# Patient Record
Sex: FEMALE | Race: BLACK OR AFRICAN AMERICAN | NOT HISPANIC OR LATINO | Employment: PART TIME | URBAN - METROPOLITAN AREA
[De-identification: names, ages, dates, MRNs, and addresses within clinical notes are randomized per-mention and may not be internally consistent; named-entity substitution may affect disease eponyms.]

---

## 2024-05-29 ENCOUNTER — HOSPITAL ENCOUNTER (INPATIENT)
Facility: REHABILITATION | Age: 59
End: 2024-05-29
Attending: PHYSICAL MEDICINE & REHABILITATION | Admitting: PHYSICAL MEDICINE & REHABILITATION
Payer: MEDICAID

## 2024-05-29 DIAGNOSIS — I61.9 HEMORRHAGIC STROKE (HCC): ICD-10-CM

## 2024-05-29 DIAGNOSIS — Z78.9 IMPAIRED MOBILITY AND ADLS: ICD-10-CM

## 2024-05-29 DIAGNOSIS — Z74.09 IMPAIRED MOBILITY AND ADLS: ICD-10-CM

## 2024-05-29 DIAGNOSIS — I10 PRIMARY HYPERTENSION: ICD-10-CM

## 2024-05-29 PROBLEM — D72.829 LEUKOCYTOSIS: Status: ACTIVE | Noted: 2024-05-29

## 2024-05-29 PROBLEM — R33.9 URINARY RETENTION: Status: ACTIVE | Noted: 2024-05-29

## 2024-05-29 PROCEDURE — 99223 1ST HOSP IP/OBS HIGH 75: CPT | Performed by: PHYSICAL MEDICINE & REHABILITATION

## 2024-05-29 PROCEDURE — 700102 HCHG RX REV CODE 250 W/ 637 OVERRIDE(OP): Performed by: PHYSICAL MEDICINE & REHABILITATION

## 2024-05-29 PROCEDURE — 770010 HCHG ROOM/CARE - REHAB SEMI PRIVAT*

## 2024-05-29 PROCEDURE — 94760 N-INVAS EAR/PLS OXIMETRY 1: CPT

## 2024-05-29 PROCEDURE — A9270 NON-COVERED ITEM OR SERVICE: HCPCS | Performed by: PHYSICAL MEDICINE & REHABILITATION

## 2024-05-29 RX ORDER — ACETAMINOPHEN 500 MG
500 TABLET ORAL EVERY 6 HOURS PRN
Status: DISCONTINUED | OUTPATIENT
Start: 2024-05-29 | End: 2024-06-20 | Stop reason: HOSPADM

## 2024-05-29 RX ORDER — ALUMINA, MAGNESIA, AND SIMETHICONE 2400; 2400; 240 MG/30ML; MG/30ML; MG/30ML
20 SUSPENSION ORAL
Status: DISCONTINUED | OUTPATIENT
Start: 2024-05-29 | End: 2024-06-20 | Stop reason: HOSPADM

## 2024-05-29 RX ORDER — ECHINACEA PURPUREA EXTRACT 125 MG
2 TABLET ORAL PRN
Status: DISCONTINUED | OUTPATIENT
Start: 2024-05-29 | End: 2024-06-20 | Stop reason: HOSPADM

## 2024-05-29 RX ORDER — ONDANSETRON 2 MG/ML
4 INJECTION INTRAMUSCULAR; INTRAVENOUS 4 TIMES DAILY PRN
Status: DISCONTINUED | OUTPATIENT
Start: 2024-05-29 | End: 2024-06-20 | Stop reason: HOSPADM

## 2024-05-29 RX ORDER — OMEPRAZOLE 20 MG/1
20 CAPSULE, DELAYED RELEASE ORAL DAILY
Status: DISCONTINUED | OUTPATIENT
Start: 2024-05-29 | End: 2024-06-20 | Stop reason: HOSPADM

## 2024-05-29 RX ORDER — LISINOPRIL 20 MG/1
40 TABLET ORAL
Status: DISCONTINUED | OUTPATIENT
Start: 2024-05-30 | End: 2024-06-07

## 2024-05-29 RX ORDER — IPRATROPIUM BROMIDE AND ALBUTEROL SULFATE 2.5; .5 MG/3ML; MG/3ML
3 SOLUTION RESPIRATORY (INHALATION)
Status: DISCONTINUED | OUTPATIENT
Start: 2024-05-29 | End: 2024-06-20 | Stop reason: HOSPADM

## 2024-05-29 RX ORDER — AMLODIPINE BESYLATE 5 MG/1
10 TABLET ORAL
Status: DISCONTINUED | OUTPATIENT
Start: 2024-05-30 | End: 2024-06-20 | Stop reason: HOSPADM

## 2024-05-29 RX ORDER — TAMSULOSIN HYDROCHLORIDE 0.4 MG/1
0.4 CAPSULE ORAL
Status: DISCONTINUED | OUTPATIENT
Start: 2024-05-29 | End: 2024-06-07

## 2024-05-29 RX ORDER — AMOXICILLIN 250 MG
2 CAPSULE ORAL EVERY EVENING
Status: DISCONTINUED | OUTPATIENT
Start: 2024-05-29 | End: 2024-06-07

## 2024-05-29 RX ORDER — BISACODYL 10 MG
10 SUPPOSITORY, RECTAL RECTAL
Status: DISCONTINUED | OUTPATIENT
Start: 2024-05-29 | End: 2024-06-20 | Stop reason: HOSPADM

## 2024-05-29 RX ORDER — CARBOXYMETHYLCELLULOSE SODIUM 5 MG/ML
1 SOLUTION/ DROPS OPHTHALMIC PRN
Status: DISCONTINUED | OUTPATIENT
Start: 2024-05-29 | End: 2024-06-20 | Stop reason: HOSPADM

## 2024-05-29 RX ORDER — LANOLIN ALCOHOL/MO/W.PET/CERES
6 CREAM (GRAM) TOPICAL
Status: DISCONTINUED | OUTPATIENT
Start: 2024-05-29 | End: 2024-06-20 | Stop reason: HOSPADM

## 2024-05-29 RX ORDER — TRAZODONE HYDROCHLORIDE 50 MG/1
50 TABLET ORAL
Status: DISCONTINUED | OUTPATIENT
Start: 2024-05-29 | End: 2024-06-20 | Stop reason: HOSPADM

## 2024-05-29 RX ORDER — POLYETHYLENE GLYCOL 3350 17 G/17G
1 POWDER, FOR SOLUTION ORAL
Status: DISCONTINUED | OUTPATIENT
Start: 2024-05-29 | End: 2024-06-07

## 2024-05-29 RX ORDER — ONDANSETRON 4 MG/1
4 TABLET, ORALLY DISINTEGRATING ORAL 4 TIMES DAILY PRN
Status: DISCONTINUED | OUTPATIENT
Start: 2024-05-29 | End: 2024-06-20 | Stop reason: HOSPADM

## 2024-05-29 RX ORDER — HYDRALAZINE HYDROCHLORIDE 25 MG/1
25 TABLET, FILM COATED ORAL EVERY 8 HOURS PRN
Status: DISCONTINUED | OUTPATIENT
Start: 2024-05-29 | End: 2024-06-20 | Stop reason: HOSPADM

## 2024-05-29 RX ORDER — HYDRALAZINE HYDROCHLORIDE 50 MG/1
50 TABLET, FILM COATED ORAL 4 TIMES DAILY
Status: DISCONTINUED | OUTPATIENT
Start: 2024-05-29 | End: 2024-06-05

## 2024-05-29 RX ADMIN — TAMSULOSIN HYDROCHLORIDE 0.4 MG: 0.4 CAPSULE ORAL at 16:21

## 2024-05-29 RX ADMIN — BENZOCAINE AND MENTHOL 1 LOZENGE: 15; 3.6 LOZENGE ORAL at 20:47

## 2024-05-29 RX ADMIN — OMEPRAZOLE 20 MG: 20 CAPSULE, DELAYED RELEASE ORAL at 16:21

## 2024-05-29 RX ADMIN — ACETAMINOPHEN 500 MG: 500 TABLET, FILM COATED ORAL at 20:51

## 2024-05-29 RX ADMIN — HYDRALAZINE HYDROCHLORIDE 50 MG: 50 TABLET ORAL at 16:21

## 2024-05-29 RX ADMIN — HYDRALAZINE HYDROCHLORIDE 50 MG: 50 TABLET ORAL at 20:47

## 2024-05-29 RX ADMIN — SENNOSIDES AND DOCUSATE SODIUM 2 TABLET: 50; 8.6 TABLET ORAL at 20:47

## 2024-05-29 ASSESSMENT — LIFESTYLE VARIABLES
HAVE YOU EVER FELT YOU SHOULD CUT DOWN ON YOUR DRINKING: NO
TOTAL SCORE: 0
HAVE PEOPLE ANNOYED YOU BY CRITICIZING YOUR DRINKING: NO
EVER_SMOKED: YES
TOTAL SCORE: 0
CONSUMPTION TOTAL: NEGATIVE
ALCOHOL_USE: NO
HOW MANY TIMES IN THE PAST YEAR HAVE YOU HAD 5 OR MORE DRINKS IN A DAY: 0
EVER HAD A DRINK FIRST THING IN THE MORNING TO STEADY YOUR NERVES TO GET RID OF A HANGOVER: NO
TOTAL SCORE: 0
ON A TYPICAL DAY WHEN YOU DRINK ALCOHOL HOW MANY DRINKS DO YOU HAVE: 0
AVERAGE NUMBER OF DAYS PER WEEK YOU HAVE A DRINK CONTAINING ALCOHOL: 0
EVER FELT BAD OR GUILTY ABOUT YOUR DRINKING: NO

## 2024-05-29 ASSESSMENT — PATIENT HEALTH QUESTIONNAIRE - PHQ9
2. FEELING DOWN, DEPRESSED, IRRITABLE, OR HOPELESS: NOT AT ALL
SUM OF ALL RESPONSES TO PHQ9 QUESTIONS 1 AND 2: 0
1. LITTLE INTEREST OR PLEASURE IN DOING THINGS: NOT AT ALL

## 2024-05-29 ASSESSMENT — FIBROSIS 4 INDEX: FIB4 SCORE: 0.7

## 2024-05-29 NOTE — FLOWSHEET NOTE
05/29/24 1526   Events/Summary/Plan   Events/Summary/Plan RT Consult   Vital Signs   Pulse 74   Respiration 16   Pulse Oximetry 97 %   $ Pulse Oximetry (Spot Check) Yes   Respiratory Assessment   Level of Consciousness Alert   Chest Exam   Work Of Breathing / Effort Within Normal Limits   Breath Sounds   RUL Breath Sounds Clear   RML Breath Sounds Clear   RLL Breath Sounds Diminished   OZIEL Breath Sounds Clear   LLL Breath Sounds Diminished   Oxygen   O2 Delivery Device Room air w/o2 available   Smoking History   Have you ever smoked Yes   Have you smoked in the last 12 months Yes   Have you quit smoking Yes   Confirm Quit Date 05/23/24

## 2024-05-29 NOTE — PROGRESS NOTES
Patient admitted to facility at 1510 via gurney; accompanied by hospital transport. Patient assisted to room and positioned in bed for comfort and safety, call light within reach. Patient assisted with stowing belongings and oriented to room and facility. Admission assessment performed and documented in computer. Admission paperwork completed, signed copies placed in chart.   Fall prevention video and tip sheet shown to patient, educated on call light use, and need to call nursing staff for transfer assistance.

## 2024-05-29 NOTE — H&P
Physical Medicine & Rehabilitation  History and Physical (H&P)  &     Post Admission Physician Evaluation (MC)       Date of Admission: 5/29/2024   Date of Service: 5/29/2024   Latanya Ferrera  Room/bed 108 D    [facility] Code to Support Admission: 0001.2 - Stroke: Right Body Involvement (Left Brain)  Etiologic Diagnosis: Hemorrhagic stroke (HCC)    _______________________________________________    Chief Complaint: Stroke     History of Present Illness:    Latanya Ferrera is a 57 yo female with PMH significant for tobacco use, hypertension who presented to [facility] with right arm weakness an leg weakness. Her symptoms began suddenly while at home. Initially she had facial numbness, later noticed arm weakness. She had chills in the days prior. She had been on anti-hypertensives, but had not been taking them for the past couple of months and had not been monitoring her BP. Her BP in ED was in 200's. She had leukocytosis at 12.9. Imaging revealed left intracranial hemorrhage in the L thalamus. This was compatible with hypertensive hemorrhage. No underlying mass or vascular malformations seen. Neurology and NSGY consulted. No neurosurgical intervention after stable repeat imaging. Her hospital course c/b uncontrolled BP now on 3 agents, urinary retention now with otero, leukocytosis now on Rocephin. Code stroke was called 5/28 but evaluation did not reveal anything new.     Deemed appropriate for [facility] and admitted 5/29/2024. On admission patient reports she is doing ok. Has not had BM in a couple of days. Is using IDFC for bladder management has paresthesias of her R side and feels numb. Arm and leg feel equally weak. Wants to make sure we are giving her anti-hypertensives. Dtr and mother at bedside. Speech is slurred.     Review of Systems:     14 point ROS reviewed and negative except as stated above.      Past Medical History:  Past Medical History:   Diagnosis Date    Hypertension     Tobacco abuse        Past Surgical  History:  No past surgical history on file.    Family History:  No family history on file.    Medications:  Current Facility-Administered Medications   Medication Dose    Respiratory Therapy Consult      Pharmacy Consult Request ...Pain Management Review 1 Each  1 Each    omeprazole (PriLOSEC) capsule 20 mg  20 mg    hydrALAZINE (Apresoline) tablet 25 mg  25 mg    senna-docusate (Pericolace Or Senokot S) 8.6-50 MG per tablet 2 Tablet  2 Tablet    And    polyethylene glycol/lytes (Miralax) Packet 1 Packet  1 Packet    bisacodyl (Dulcolax) suppository 10 mg  10 mg    magnesium hydroxide (Milk Of Magnesia) suspension 30 mL  30 mL    acetaminophen (Tylenol) tablet 500 mg  500 mg    carboxymethylcellulose (Refresh Tears) 0.5 % ophthalmic drops 1 Drop  1 Drop    benzocaine-menthol (Cepacol) lozenge 1 Lozenge  1 Lozenge    mag hydrox-al hydrox-simeth (Maalox Plus Es Or Mylanta Ds) suspension 20 mL  20 mL    ondansetron (Zofran ODT) dispertab 4 mg  4 mg    Or    ondansetron (Zofran) syringe/vial injection 4 mg  4 mg    traZODone (Desyrel) tablet 50 mg  50 mg    sodium chloride (Ocean) 0.65 % nasal spray 2 Spray  2 Spray    [START ON 5/30/2024] cefTRIAXone (Rocephin) 1,000 mg in  mL IVPB  1,000 mg    ipratropium-albuterol (DUONEB) nebulizer solution  3 mL    [START ON 5/30/2024] amLODIPine (Norvasc) tablet 10 mg  10 mg    hydrALAZINE (Apresoline) tablet 50 mg  50 mg    [START ON 5/30/2024] lisinopril (Prinivil) tablet 40 mg  40 mg    melatonin tablet 6 mg  6 mg    tamsulosin (Flomax) capsule 0.4 mg  0.4 mg       Allergies:  Patient has no allergy information on record.    Psychosocial History:  Living Site:  Home  Living With:  self  Caregiver's availability:   Intermittent support       Level of Function Prior to Disability:  Independent    Level of Function Prior to Admission to [facility]:      CURRENT LEVEL OF FUNCTION:   Same as level of function prior to admission to [facility]    Physical Examination:     VITAL SIGNS:   pulse is 74. Her respiration is 16 and oxygen saturation is 97%.   GENERAL: No apparent distress  HEENT: Normocephalic/atraumatic and EOMI. Right facial droop  CARDIAC: Regular rate and rhythm. No murmurs appreciated.   LUNGS: Clear to auscultation   ABDOMINAL: soft, nontender, and nondistended    EXTREMITIES: no edema. + clonus RLE at ankle. No overt spasticity  NEURO:  Mental status:  A&Ox4 (person, place, date, situation) answers questions appropriately follows commands  Speech: + dysarthria  CRANIAL NERVES: Right facial droop and sensation impairment     Motor Exam Upper Extremities   ? Myotome R L   Shoulder Abduction C5 2 5   Elbow flexion C5 2 5   Wrist extension C6 2 5   Elbow extension C7 2 5   Finger flexion C8 1 5   Finger abduction T1 1 5     Motor Exam Lower Extremities  ? Myotome R L   Hip flexion L2 2 5   Knee extension L3 2 5   Ankle dorsiflexion L4 2 5   Toe extension L5 2 5   Ankle plantarflexion S1 2 5     DTRs: 2+ in bilateral biceps and patellar tendons  + clonus R ankle  Negative Cheung b/l   Tone: no spasticity noted      Radiology:      Laboratory Values:  WBC - 11.8  Hgb - 12.6  Hct - 39  Plt - 266    Na - 142  Cr - 0.6  BUN - 8  ALT/AST - 16/14  K+ - 3.8        Primary Rehabilitation Diagnosis:    This patient is a 58 y.o. female admitted for acute inpatient rehabilitation with Hemorrhagic stroke (HCC).    Impairments:   Cognitive  ADLs/IADLs  Mobility    Secondary Diagnosis/Medical Co-morbidities Affecting Function  Leukocytosis, hypertension, impaired ADLs, impaired mobility, cognitive impairment.     Relevant Changes Since Preadmission Evaluation:    Status unchanged    The patient's rehabilitation potential is Excellent  The patient's medical prognosis is excellent    Rehabilitation Plan:   Discussion and Recommendations:   1. The patient requires an acute inpatient rehabilitation program with a coordinated program of care at an  intensity and frequency not available at a lower level of care. This recommendation is substantiated by the patient's medical physicians who recommend that the patient's intervention and assessment of medical issues needs to be done at an acute level of care for patient's safety and maximum outcome.   2. A coordinated program of care will be supplied by an interdisciplinary team of physical therapy, occupational therapy, rehab physician, rehab nursing, and, if needed, speech therapy and rehab psychology. Rehab team presents a patient-specific rehabilitation and education program concentrating on prevention of future problems related to accessibility, mobility, skin, bowel, bladder, sexuality, and psychosocial and medical/surgical problems.   3. Need for Rehabilitation Physician: The rehab physician will be evaluating the patient on a multi-weekly basis to help coordinate the program of care. The rehab physician communicates between medical physicians, therapists, and nurses to maximize the patient's potential outcome. Specific areas in which the rehab physician will be providing daily assessment include the following:   A. Assessing the patient's heart rate and blood pressure response (vitals monitoring) to activity and making adjustments in medications or conservative measures as needed.   B. The rehab physician will be assessing the frequency at which the program can be increased to allow the patient to reach optimal functional outcome.   C. The rehab physician will also provide assessments in daily skin care, especially in light of patient's impairments in mobility.   D. The rehab physician will provide special expertise in understanding how to work with functional impairment and recommend appropriate interventions, compensatory techniques, and education that will facilitate the patient's outcome.   4. Rehab R.N.   The rehab RN will be working with patient to carry over in room mobility and activities of daily  living when the patient is not in 3 hours of skilled therapy. Rehab nursing will be working in conjunction with rehab physician to address all the medical issues above and continue to assess laboratory work and discuss abnormalities with the treating physicians, assess vitals, and response to activity, and discuss and report abnormalities with the rehab physician. Rehab RN will also continue daily skin care, supervise bladder/bowel program, instruct in medication administration, and ensure patient safety.   5. Rehab Therapy: Therapies to treat at intensity and frequency of (may change after completion of evaluation by all therapeutic disciplines):       PT:  Physical therapy to address mobility, transfer, gait training and evaluation for adaptive equipment needs 1 hour/day at least 5 days/week for the duration of the ELOS (see below)       OT:  Occupational therapy to address ADLs, self-care, home management training, functional mobility/transfers and assistive device evaluation, and community re-integration 1  hour/day at least 5 days/week for the duration of the ELOS (see below).        ST/Dysphagia:  Speech therapy to address speech, language, and cognitive deficits as well as swallowing difficulties with retraining/dysphagia management and community re-integration with comprehension, expression, cognitive training 1  hour/day at least 5 days/week for the duration of the ELOS (see below).     Medical management / Rehabilitation Issues/ Adverse Potential as part of rehabilitation plan     Rehabilitation Issues/Adverse Potential  1.  CVA (Cerebrovascular Accident): Cont blood pressure control for secondary prophylaxis as well as lipid and blood pressure management. Patient demonstrates functional deficits in strength, balance, coordination, and ADL's. Patient is admitted to [facility] for comprehensive rehabilitation therapy as described below.   Rehabilitation nursing monitors bowel and bladder  control, educates on medication administration, co-morbidities and monitors patient safety.    2.  Neurostimulants: None at this time but continue to assess daily for need to initiate should status change.    3.  DVT prophylaxis:  Patient is not on chemoprophylaxis for anticoagulation upon transfer. Encourage OOB. Monitor daily for signs and symptoms of DVT including but not limited to swelling and pain to prevent the development of DVT that may interfere with therapies.    4.  GI prophylaxis:  On prilosec to prevent gastritis/dyspepsia which may interfere with therapies.    5.  Pain: Controlled with Tyelnol    6.  Nutrition/Dysphagia: Dietician monitors nutrient intake, recommend supplements prn and provide nutrition education to pt/family to promote optimal nutrition for wound healing/recovery.     7.  Bladder/bowel:  Start bowel and bladder program as described below, to prevent constipation, urinary retention (which may lead to UTI), and urinary incontinence (which will impact upon pt's functional independence).   - TV Q3h while awake with post void bladder scans, I&O cath for PVRs >400  - up to commode after meal     8.  Skin/dermal ulcer prophylaxis: Monitor for new skin conditions with q.2 h. turns as required to prevent the development of skin breakdown.     9.  Cognition/Behavior: As needed psychologist provides adjustment counseling to illness and psychosocial barriers that may be potential barriers to rehabilitation.     10. Respiratory therapy: RT performs O2 management prn, breathing retraining, pulmonary hygiene and bronchospasm management prn to optimize participation in therapies.     Medical Co-Morbidities/Adverse Potential Affecting Function:    Left Basal Ganglia ICH secondary to hypertensive emergency  Right Hemiparesis  PT and OT for mobility and ADLs. Per guidelines, 15 hours per week between PT, OT and/or SLP.  Follow-up Neurology (Dr. Babb)  Secondary stroke ppx:  Anti-hypertensives    Hypertension  Continue Hydralazine 50mg q6 hours  Continue Lisinopril 40mg daily   Continue Amlodipine 10mg daily     Leukocytosis  Improving on admission but unclear cause  Continue Rocephin x 2 doses (through 5/31)    Urinary Retention  Has otero - required CIC   Continue Flomax 0.4mg nightly     Pain  Tylenol PRN    GI Ppx  Patient on Prilosec for GERD prophylaxis.     Bowel   Patient on Senna-docusate for constipation prophylaxis.       Upcoming Labs/imaging: Admission labs    DVT PROPHYLAXIS: None - Hemorrhagic stroke. Check US. Per Neuro note, clear for DVT chemo-ppx once MRI completed (completed 5/24)    HOSPITALIST FOLLOWING: None    CODE STATUS: FULL CODE    DISPO: Home with minimal support    VIANCA: TBD    MEDS SENT TO: TBD    DISCHARGE SPECIALIST FOLLOW UP: Neurology    Patient to scheduled follow up with their PCP within 2 weeks from discharge from the [facility].     I personally performed a complete drug regimen review and no potential clinically significant medication issues were identified.     Goals/Expected Level of Function Based on Current Medical and Functional Status:  (may change based on patient's medical status and rate of impairment recovery)  Transfers:   Modified Independent  Mobility/Gait:   Modified Independent  ADL's:   Modified Independent  Cognition:  Modified Independent     DISPOSITION: Discharge to pre-morbid independent living setting with the supportive care of patient's family.    ELOS: 21-28 days  ____________________________________    Dr. Sofi Jules DO, MS  [facility] [place] - Physical Medicine & Rehabilitation   ____________________________________    Pt was seen today for 75 min, and entire time spent in face-to-face contact was >50% in counseling and coordination of care as detailed in A/P above.

## 2024-05-29 NOTE — PROGRESS NOTES
4 Eyes Skin Assessment Completed by Amada JACINTO, RN and Marlee RN.    Head WDL  Ears WDL  Nose WDL  Mouth WDL  Neck WDL  Breast/Chest WDL  Shoulder Blades WDL  Spine WDL  (R) Arm/Elbow/Hand WDL  (L) Arm/Elbow/Hand WDL  Abdomen WDL  Groin WDL  Scrotum/Coccyx/Buttocks WDL  (R) Leg WDL  (L) Leg WDL  (R) Heel/Foot/Toe Calloused  (L) Heel/Foot/Toe Calloused          Devices In Places otero      Interventions In Place Waffle Overlay, Pillows, and Q2 Turns    Possible Skin Injury No    Pictures Uploaded Into Epic Yes  Wound Consult Placed N/A  RN Wound Prevention Protocol Ordered Yes

## 2024-05-30 ENCOUNTER — APPOINTMENT (OUTPATIENT)
Dept: PHYSICAL THERAPY | Facility: REHABILITATION | Age: 59
DRG: 057 | End: 2024-05-30
Attending: PHYSICAL MEDICINE & REHABILITATION
Payer: MEDICAID

## 2024-05-30 ENCOUNTER — ANCILLARY PROCEDURE (OUTPATIENT)
Dept: CARDIOLOGY | Facility: REHABILITATION | Age: 59
End: 2024-05-30
Attending: PHYSICAL MEDICINE & REHABILITATION
Payer: MEDICAID

## 2024-05-30 ENCOUNTER — APPOINTMENT (OUTPATIENT)
Dept: SPEECH THERAPY | Facility: REHABILITATION | Age: 59
DRG: 057 | End: 2024-05-30
Attending: PHYSICAL MEDICINE & REHABILITATION
Payer: MEDICAID

## 2024-05-30 ENCOUNTER — APPOINTMENT (OUTPATIENT)
Dept: OCCUPATIONAL THERAPY | Facility: REHABILITATION | Age: 59
DRG: 057 | End: 2024-05-30
Attending: PHYSICAL MEDICINE & REHABILITATION
Payer: MEDICAID

## 2024-05-30 LAB
25(OH)D3 SERPL-MCNC: 7 NG/ML (ref 30–100)
ALBUMIN SERPL BCP-MCNC: 3.6 G/DL (ref 3.2–4.9)
ALBUMIN/GLOB SERPL: 1.2 G/DL
ALP SERPL-CCNC: 85 U/L (ref 30–99)
ALT SERPL-CCNC: 11 U/L (ref 2–50)
ANION GAP SERPL CALC-SCNC: 13 MMOL/L (ref 7–16)
AST SERPL-CCNC: 9 U/L (ref 12–45)
BASOPHILS # BLD AUTO: 0.2 % (ref 0–1.8)
BASOPHILS # BLD: 0.02 K/UL (ref 0–0.12)
BILIRUB SERPL-MCNC: 0.5 MG/DL (ref 0.1–1.5)
BUN SERPL-MCNC: 21 MG/DL (ref 8–22)
CALCIUM ALBUM COR SERPL-MCNC: 9.3 MG/DL (ref 8.5–10.5)
CALCIUM SERPL-MCNC: 9 MG/DL (ref 8.5–10.5)
CHLORIDE SERPL-SCNC: 110 MMOL/L (ref 96–112)
CHOLEST SERPL-MCNC: 155 MG/DL (ref 100–199)
CO2 SERPL-SCNC: 19 MMOL/L (ref 20–33)
CREAT SERPL-MCNC: 0.67 MG/DL (ref 0.5–1.4)
EOSINOPHIL # BLD AUTO: 0.28 K/UL (ref 0–0.51)
EOSINOPHIL NFR BLD: 2.6 % (ref 0–6.9)
ERYTHROCYTE [DISTWIDTH] IN BLOOD BY AUTOMATED COUNT: 47 FL (ref 35.9–50)
EST. AVERAGE GLUCOSE BLD GHB EST-MCNC: 114 MG/DL
GFR SERPLBLD CREATININE-BSD FMLA CKD-EPI: 102 ML/MIN/1.73 M 2
GLOBULIN SER CALC-MCNC: 2.9 G/DL (ref 1.9–3.5)
GLUCOSE SERPL-MCNC: 109 MG/DL (ref 65–99)
HBA1C MFR BLD: 5.6 % (ref 4–5.6)
HCT VFR BLD AUTO: 35.7 % (ref 37–47)
HDLC SERPL-MCNC: 30 MG/DL
HGB BLD-MCNC: 11.8 G/DL (ref 12–16)
IMM GRANULOCYTES # BLD AUTO: 0.04 K/UL (ref 0–0.11)
IMM GRANULOCYTES NFR BLD AUTO: 0.4 % (ref 0–0.9)
LDLC SERPL CALC-MCNC: 109 MG/DL
LYMPHOCYTES # BLD AUTO: 1.88 K/UL (ref 1–4.8)
LYMPHOCYTES NFR BLD: 17.6 % (ref 22–41)
MAGNESIUM SERPL-MCNC: 2.2 MG/DL (ref 1.5–2.5)
MCH RBC QN AUTO: 30.5 PG (ref 27–33)
MCHC RBC AUTO-ENTMCNC: 33.1 G/DL (ref 32.2–35.5)
MCV RBC AUTO: 92.2 FL (ref 81.4–97.8)
MONOCYTES # BLD AUTO: 0.74 K/UL (ref 0–0.85)
MONOCYTES NFR BLD AUTO: 6.9 % (ref 0–13.4)
NEUTROPHILS # BLD AUTO: 7.75 K/UL (ref 1.82–7.42)
NEUTROPHILS NFR BLD: 72.3 % (ref 44–72)
NRBC # BLD AUTO: 0 K/UL
NRBC BLD-RTO: 0 /100 WBC (ref 0–0.2)
PHOSPHATE SERPL-MCNC: 4.6 MG/DL (ref 2.5–4.5)
PLATELET # BLD AUTO: 239 K/UL (ref 164–446)
PMV BLD AUTO: 10.8 FL (ref 9–12.9)
POTASSIUM SERPL-SCNC: 3.9 MMOL/L (ref 3.6–5.5)
PROT SERPL-MCNC: 6.5 G/DL (ref 6–8.2)
RBC # BLD AUTO: 3.87 M/UL (ref 4.2–5.4)
SODIUM SERPL-SCNC: 142 MMOL/L (ref 135–145)
TRIGL SERPL-MCNC: 79 MG/DL (ref 0–149)
TSH SERPL DL<=0.005 MIU/L-ACNC: 2.24 UIU/ML (ref 0.38–5.33)
WBC # BLD AUTO: 10.7 K/UL (ref 4.8–10.8)

## 2024-05-30 PROCEDURE — 770010 HCHG ROOM/CARE - REHAB SEMI PRIVAT*

## 2024-05-30 PROCEDURE — 80053 COMPREHEN METABOLIC PANEL: CPT

## 2024-05-30 PROCEDURE — 83735 ASSAY OF MAGNESIUM: CPT

## 2024-05-30 PROCEDURE — 83036 HEMOGLOBIN GLYCOSYLATED A1C: CPT

## 2024-05-30 PROCEDURE — A9270 NON-COVERED ITEM OR SERVICE: HCPCS | Performed by: PHYSICAL MEDICINE & REHABILITATION

## 2024-05-30 PROCEDURE — 93970 EXTREMITY STUDY: CPT

## 2024-05-30 PROCEDURE — 36415 COLL VENOUS BLD VENIPUNCTURE: CPT

## 2024-05-30 PROCEDURE — 97163 PT EVAL HIGH COMPLEX 45 MIN: CPT

## 2024-05-30 PROCEDURE — 92523 SPEECH SOUND LANG COMPREHEN: CPT

## 2024-05-30 PROCEDURE — 93970 EXTREMITY STUDY: CPT | Mod: 26 | Performed by: INTERNAL MEDICINE

## 2024-05-30 PROCEDURE — 700105 HCHG RX REV CODE 258: Performed by: PHYSICAL MEDICINE & REHABILITATION

## 2024-05-30 PROCEDURE — 700111 HCHG RX REV CODE 636 W/ 250 OVERRIDE (IP): Mod: JZ | Performed by: PHYSICAL MEDICINE & REHABILITATION

## 2024-05-30 PROCEDURE — 99232 SBSQ HOSP IP/OBS MODERATE 35: CPT | Performed by: PHYSICAL MEDICINE & REHABILITATION

## 2024-05-30 PROCEDURE — 97116 GAIT TRAINING THERAPY: CPT

## 2024-05-30 PROCEDURE — 84100 ASSAY OF PHOSPHORUS: CPT

## 2024-05-30 PROCEDURE — 82306 VITAMIN D 25 HYDROXY: CPT

## 2024-05-30 PROCEDURE — 85025 COMPLETE CBC W/AUTO DIFF WBC: CPT

## 2024-05-30 PROCEDURE — 80061 LIPID PANEL: CPT

## 2024-05-30 PROCEDURE — 97535 SELF CARE MNGMENT TRAINING: CPT

## 2024-05-30 PROCEDURE — 97166 OT EVAL MOD COMPLEX 45 MIN: CPT

## 2024-05-30 PROCEDURE — 84443 ASSAY THYROID STIM HORMONE: CPT

## 2024-05-30 PROCEDURE — 700102 HCHG RX REV CODE 250 W/ 637 OVERRIDE(OP): Performed by: PHYSICAL MEDICINE & REHABILITATION

## 2024-05-30 RX ORDER — VITAMIN B COMPLEX
5000 TABLET ORAL DAILY
Status: DISCONTINUED | OUTPATIENT
Start: 2024-05-30 | End: 2024-06-20 | Stop reason: HOSPADM

## 2024-05-30 RX ORDER — ENOXAPARIN SODIUM 100 MG/ML
40 INJECTION SUBCUTANEOUS DAILY
Status: DISCONTINUED | OUTPATIENT
Start: 2024-06-03 | End: 2024-06-19

## 2024-05-30 RX ORDER — DOCUSATE SODIUM 100 MG/1
200 CAPSULE, LIQUID FILLED ORAL 2 TIMES DAILY
Status: DISCONTINUED | OUTPATIENT
Start: 2024-05-30 | End: 2024-06-07

## 2024-05-30 RX ADMIN — DOCUSATE SODIUM 200 MG: 100 CAPSULE, LIQUID FILLED ORAL at 20:33

## 2024-05-30 RX ADMIN — TAMSULOSIN HYDROCHLORIDE 0.4 MG: 0.4 CAPSULE ORAL at 17:23

## 2024-05-30 RX ADMIN — HYDRALAZINE HYDROCHLORIDE 50 MG: 50 TABLET ORAL at 17:23

## 2024-05-30 RX ADMIN — POLYETHYLENE GLYCOL 3350 1 PACKET: 17 POWDER, FOR SOLUTION ORAL at 17:23

## 2024-05-30 RX ADMIN — HYDRALAZINE HYDROCHLORIDE 50 MG: 50 TABLET ORAL at 20:33

## 2024-05-30 RX ADMIN — OMEPRAZOLE 20 MG: 20 CAPSULE, DELAYED RELEASE ORAL at 08:14

## 2024-05-30 RX ADMIN — SENNOSIDES AND DOCUSATE SODIUM 2 TABLET: 50; 8.6 TABLET ORAL at 20:33

## 2024-05-30 RX ADMIN — Medication 5000 UNITS: at 17:23

## 2024-05-30 RX ADMIN — ACETAMINOPHEN 500 MG: 500 TABLET, FILM COATED ORAL at 20:38

## 2024-05-30 RX ADMIN — ACETAMINOPHEN 500 MG: 500 TABLET, FILM COATED ORAL at 17:23

## 2024-05-30 RX ADMIN — CEFTRIAXONE SODIUM 1000 MG: 1 INJECTION, POWDER, FOR SOLUTION INTRAMUSCULAR; INTRAVENOUS at 09:03

## 2024-05-30 RX ADMIN — LISINOPRIL 40 MG: 20 TABLET ORAL at 05:14

## 2024-05-30 RX ADMIN — HYDRALAZINE HYDROCHLORIDE 50 MG: 50 TABLET ORAL at 08:14

## 2024-05-30 RX ADMIN — AMLODIPINE BESYLATE 10 MG: 5 TABLET ORAL at 05:14

## 2024-05-30 SDOH — ECONOMIC STABILITY: TRANSPORTATION INSECURITY
IN THE PAST 12 MONTHS, HAS THE LACK OF TRANSPORTATION KEPT YOU FROM MEDICAL APPOINTMENTS OR FROM GETTING MEDICATIONS?: NO

## 2024-05-30 SDOH — ECONOMIC STABILITY: TRANSPORTATION INSECURITY
IN THE PAST 12 MONTHS, HAS LACK OF RELIABLE TRANSPORTATION KEPT YOU FROM MEDICAL APPOINTMENTS, MEETINGS, WORK OR FROM GETTING THINGS NEEDED FOR DAILY LIVING?: NO

## 2024-05-30 ASSESSMENT — BRIEF INTERVIEW FOR MENTAL STATUS (BIMS)
INITIAL REPETITION OF BED BLUE SOCK - FIRST ATTEMPT: 3
WHAT YEAR IS IT: CORRECT
ASKED TO RECALL BLUE: YES, NO CUE REQUIRED
WHAT DAY OF THE WEEK IS IT: CORRECT
WHAT MONTH IS IT: ACCURATE WITHIN 5 DAYS
ASKED TO RECALL BED: NO, COULD NOT RECALL
ASKED TO RECALL SOCK: YES, NO CUE REQUIRED
BIMS SUMMARY SCORE: 13

## 2024-05-30 ASSESSMENT — PATIENT HEALTH QUESTIONNAIRE - PHQ9
1. LITTLE INTEREST OR PLEASURE IN DOING THINGS: NOT AT ALL
2. FEELING DOWN, DEPRESSED, IRRITABLE, OR HOPELESS: NOT AT ALL
SUM OF ALL RESPONSES TO PHQ9 QUESTIONS 1 AND 2: 0

## 2024-05-30 ASSESSMENT — ACTIVITIES OF DAILY LIVING (ADL)
BED_CHAIR_WHEELCHAIR_TRANSFER_DESCRIPTION: OTHER (COMMENT);VERBAL CUEING
TOILETING: INDEPENDENT

## 2024-05-30 ASSESSMENT — GAIT ASSESSMENTS
DISTANCE (FEET): 60
GAIT LEVEL OF ASSIST: MAXIMAL ASSIST
ASSISTIVE DEVICE: OTHER (COMMENTS)

## 2024-05-30 ASSESSMENT — PAIN DESCRIPTION - PAIN TYPE: TYPE: ACUTE PAIN

## 2024-05-30 NOTE — THERAPY
Occupational Therapy  Daily Treatment     Patient Name: Latanya Ferrera  Age:  58 y.o., Sex:  female  Medical Record #: [id number]  Today's Date: 5/30/2024     Precautions  Precautions: (P) Fall Risk (ICH)    Subjective    Patient received resting semi-supine in bed, alert and agreeable to OT treatment, medically stable and cleared for tx by RN.        Objective       05/30/24 0701   OT Charge Group   Charges Yes   OT Self Care / ADL (Units) 1   OT Evaluation OT Evaluation Mod   OT Total Time Spent   OT Individual Total Time Spent (Mins) 60   Prior Living Situation   Prior Services None   Housing / Facility 1 Story House   Steps Into Home 2   Steps In Home 0   Rail None   Elevator No   Bathroom Set up Bathtub / Shower Combination;Shower Curtain   Equipment Owned None   Lives with - Patient's Self Care Capacity Alone and Able to Care For Self  (Reports that she has family in the area who are able to assist)   Prior Level of ADL Function   Self Feeding Independent   Grooming / Hygiene Independent   Bathing Independent   Dressing Independent   Toileting Independent   Prior Level of IADL Function   Medication Management Independent   Laundry Independent   Kitchen Mobility Independent   Finances Independent   Home Management Independent   Shopping Independent   Prior Level Of Mobility Independent Without Device in Community   Driving / Transportation Driving Independent   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 Patient without c/o dizziness or lightheadedness   Cognitive Pattern Assessment   Cognitive Pattern Assessment Used BIMS   Brief Interview for Mental Status (BIMS)   Repetition of Three  "Words (First Attempt) 3   Temporal Orientation: Year Correct   Temporal Orientation: Month Accurate within 5 days   Temporal Orientation: Day Correct   Recall: \"Sock\" Yes, no cue required   Recall: \"Blue\" Yes, no cue required   Recall: \"Bed\" No, could not recall   BIMS Summary Score 13   Confusion Assessment Method (CAM)   Is there evidence of an acute change in mental status from the patient's baseline? No   Inattention Behavior not present   Disorganized thinking Behavior not present   Altered level of consciousness Behavior not present   Active ROM Upper Body   Active ROM Upper Body  X   Lt Shoulder Flexion Degrees   (Seated 0-100, Supine 0-180)   Eating   Assistance Needed Set-up / clean-up   CARE Score - Eating 5   Eating Discharge Goal   Discharge Goal 6   Oral Hygiene   Assistance Needed Set-up / clean-up   CARE Score - Oral Hygiene 5   Oral Hygiene Discharge Goal   Discharge Goal 6   Shower/Bathe Self   Reason if not Attempted Refused to perform   CARE Score - Shower/Bathe Self 7   Shower/Bathe Self Discharge Goal   Discharge Goal 5   Upper Body Dressing   Assistance Needed Physical assistance   Physical Assistance Level 25% or less   CARE Score - Upper Body Dressing 3   Upper Body Dressing Discharge Goal   Discharge Goal 6   Lower Body Dressing   Assistance Needed Physical assistance   Physical Assistance Level 51%-75%   CARE Score - Lower Body Dressing 2   Lower Body Dressing Discharge Goal   Discharge Goal 6   Putting On/Taking Off Footwear   Assistance Needed Physical assistance   Physical Assistance Level Total assistance   CARE Score - Putting On/Taking Off Footwear 1   Putting On/Taking Off Footwear Discharge Goal   Discharge Goal 6   Toileting Hygiene   Assistance Needed Physical assistance   Physical Assistance Level Total assistance   CARE Score - Toileting Hygiene 1   Toileting Hygiene Discharge Goal   Discharge Goal 6   Toilet Transfer   Assistance Needed Physical assistance   Physical Assistance " Level 26%-50%   CARE Score - Toilet Transfer 3   Toilet Transfer Discharge Goal   Discharge Goal 6   Hearing, Speech, and Vision   Ability to Hear Adequate   Ability to See in Adequate Light Adequate   Expression of Ideas and Wants Without difficulty   Understanding Verbal and Non-Verbal Content Understands   Functional Level of Assist   Grooming Standby Assist;Seated   Grooming Description   (Washing face/hands and oral care seated in wheelchair sink-side)   Bathing   (Patient deferred at this time, reporting she bathed with her daughter yesterday)   Upper Body Dressing Minimal Assist   Upper Body Dressing Description Assit with threading arms through sleeves;Verbal cueing  (Assist to thread over LUE with verbal cues for john-technique)   Lower Body Dressing Maximal Assist   Lower Body Dressing Description Increased time;Initial preparation for task  (Assist to thread over distal BLE 2/2 impaired RUE coordination. Patient able to pull up over proximal legs however assist needed to pull up over hips.)   Toileting Maximal Assist   Toileting Description Assist to pull pants up;Grab bar;Increased time;Assist for hygiene  (Via catheter)   Bed, Chair, Wheelchair Transfer Moderate Assist   Bed Chair Wheelchair Transfer Description   (Stand pivot transfer with HHA)   Toilet Transfers Moderate Assist  (Stand pivot transfer)   Problem List   Problem List Decreased Active Daily Living Skills;Decreased Homemaking Skills;Decreased Upper Extremity Strength Right;Decreased Upper Extremity AROM Right;Decreased Functional Mobility;Decreased Activity Tolerance;Safety Awareness Deficits / Cognition;Impaired Posture / Trunk Alignment;Impaired Coordination Right Upper Extremity;Impaired Sensation Right Upper Extremity;Impaired Postural Control / Balance   Precautions   Precautions Fall Risk  (ICH)   Current Discharge Plan   Current Discharge Plan Return to Prior Living Situation  (With increased support from friens and family)    Benefit    Therapy Benefit Patient Would Benefit from Inpatient Rehab Occupational Therapy to Maximize [place] with ADLs, IADLs and Functional Mobility.   Interdisciplinary Plan of Care Collaboration   IDT Collaboration with  Physical Therapist;Nursing   Patient Position at End of Therapy Seated;Chair Alarm On;Tray Table within Reach;Call Light within Reach;Phone within Reach  (Up in wheelchair for breakfast)   OT DME Recommendations   Bathroom Equipment Tub Transfer Bench (Medicaid Only)   Strengths & Barriers   Strengths Able to follow instructions;Good insight into deficits/needs;Independent prior level of function;Manages pain appropriately;Motivated for self care and independence;Pleasant and cooperative;Supportive family;Willingly participates in therapeutic activities   Barriers Decreased endurance;Hemiparesis;Home accessibility;Impaired activity tolerance;Impaired balance  (Impaired coordination/motor control)   Occupational Therapist Assigned   Assigned OT / Treatment Time / Comments Fox 30/60       Assessment    Per MD H&P dated 5/29/24: Latanya Ferrera is a 59 yo female with PMH significant for tobacco use, hypertension who presented to [facility] with right arm weakness an leg weakness. Her symptoms began suddenly while at home. Initially she had facial numbness, later noticed arm weakness. She had chills in the days prior. She had been on anti-hypertensives, but had not been taking them for the past couple of months and had not been monitoring her BP. Her BP in ED was in 200's. She had leukocytosis at 12.9. Imaging revealed left intracranial hemorrhage in the L thalamus. This was compatible with hypertensive hemorrhage. No underlying mass or vascular malformations seen. Neurology and NSGY consulted. No neurosurgical intervention after stable repeat imaging. Her hospital course c/b uncontrolled BP now on 3 agents, urinary retention now with otero, leukocytosis now on Rocephin. Code stroke was called  5/28 but evaluation did not reveal anything new.     Patient seen for OT evaluation. Patient with fair tolerance to therapeutic activities and self-care tasks at this time, limited by impaired activity tolerance/endurance, unilateral weakness on R side, generalized weakness/deconditioning, impaired static/dynamic balance, impaired task sequencing, and impaired motor control/coordination. Prior to admission, patient was generally functioning at INDEPENDENT level for ADLs and INDEPENDENT level for IADLs. Patient reports receiving support from her family if needed. Upon initial evaluation, patient generally performing ADLs/functional transfers at MOD A level with min-mod verbal cues for safe initiation/sequencing. Anticipate patient will make steady progress towards functional goals. Patient will benefit from ongoing OT treatment to address limitations noted above and progress towards functional baseline.      Strengths: (P) Able to follow instructions, Good insight into deficits/needs, Independent prior level of function, Manages pain appropriately, Motivated for self care and independence, Pleasant and cooperative, Supportive family, Willingly participates in therapeutic activities  Barriers: (P) Decreased endurance, Hemiparesis, Home accessibility, Impaired activity tolerance, Impaired balance (Impaired coordination/motor control)    Plan  Recommend Occupational Therapy 30-60 minutes per day 5-6 days per week for 2-3 weeks for the following treatments:  OT E Stim Attended, OT Self Care/ADL, OT Community Reintegration, OT Neuro Re-Ed/Balance, OT Sensory Int Techniques, OT Therapeutic Activity, and OT Therapeutic Exercise.    Cleared by Dr. Jules for e-stim       DME  OT DME Recommendations  Bathroom Equipment: (P) Tub Transfer Bench (Medicaid Only)    Passport items to be completed:  Perform bathroom transfers, complete dressing, complete feeding, get ready for the day, prepare a simple meal, participate in household  tasks, adapt home for safety needs, demonstrate home exercise program, complete caregiver training           Problem: Bathing  Goal: STG-Within one week, patient will bathe with MIN A and LRAD  Outcome: Progressing     Problem: Dressing  Goal: STG-Within one week, patient will dress LB with CGA and LRAD  Outcome: Progressing     Problem: Functional Transfers  Goal: STG-Within one week, patient will transfer to toilet with CGA and LRAD  Outcome: Progressing  Goal: STG-Within one week, patient will transfer to tub/shower with CGA and LRAD  Outcome: Progressing     Problem: Functional Cognition  Goal: STG-Within one week, patient will attend to R arm and incorporate into functional tasks with min cues  Outcome: Progressing     Problem: OT Long Term Goals  Goal: LTG-By discharge, patient will complete basic self care tasks with SPV-MOD I and LRAD  Outcome: Progressing  Goal: LTG-By discharge, patient will perform bathroom transfers with SPV-MOD I and LRAD  Outcome: Progressing

## 2024-05-30 NOTE — THERAPY
"Physical Therapy   Initial Evaluation     Patient Name: Latanya Ferrera  Age:  58 y.o., Sex:  female  Medical Record #: [id number]  Today's Date: 5/30/2024     Subjective    \"The neurologist told me I would be better in 3 weeks.\" Pt in bed at arrival, on phone c/ family member, agreeable to PT tx.      Objective       05/30/24 1231   PT Charge Group   Charges Yes   PT Gait Training (Units) 1   PT Evaluation PT Evaluation High   PT Total Time Spent   PT Individual Total Time Spent (Mins) 60   Prior Living Situation   Prior Services None   Housing / Facility 1 Story House   Steps Into Home 2   Steps In Home 0   Rail None   Elevator No   Bathroom Set up Bathtub / Shower Combination   Equipment Owned None   Lives with - Patient's Self Care Capacity Alone and Able to Care For Self   Comments  is incarcerated, lives alone, spends time at daughter's house and is caregiver for 7 year old grandson   Prior Level of Functional Mobility   Bed Mobility Independent   Transfer Status Independent   Ambulation Independent   Distance Ambulation (Feet) 01406  (reports walking > 96120 steps/day)   Assistive Devices Used None   Wheelchair Other (Comments)  (N/A)   Stairs Independent   Comments works in retail FT, [employer], 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   Prior Functioning: Everyday Activities   Self Care Independent   Indoor Mobility (Ambulation) Independent   Stairs Independent   Functional Cognition Independent   Prior Device Use None of the given options   Vitals   Pulse 92   Patient BP Position Sitting   Blood Pressure 119/64   Pulse Oximetry 96 %   O2 Delivery Device None - Room Air   Cognition    Cognition / Consciousness X   Level of Consciousness Alert   Safety Awareness Impaired  (moves quickly)   Attention Impaired  (redirection to task needed)   Comments able to follow 1 step commands, needs increased time and occasional VC to slow down or to initiate movement   Passive ROM Lower Body   Passive ROM Lower Body WDL   Active ROM Lower " Body    Active ROM Lower Body  X   Comments RLE impaired due to strength and motor control impairments   Strength Lower Body   Lower Body Strength  X   Rt Hip Extension Strength 2+ (P+)   Rt Hip Flexion Strength 3- (F-)   Rt Hip Abduction Strength 3- (F-)   Rt Hip Adduction Strength 3- (F-)   Rt Knee Flexion Strength 3 (F)   Rt Knee Extension Strength 3- (F-)   Rt Ankle Dorsiflexion Strength 2+ (P+)   Rt Ankle Plantar Flexion Strength 2+ (P+)   Gross Strength   (LLR grossly 4/5)   Sensation Lower Body   Lower Extremity Sensation   X   Rt Lower Extremity Light Touch Impaired  (intact)   Rt Lower Extremity Proprioception   (intact)   Lt Lower Extremity Light Touch   (intact)   Lt Lower Extremity Proprioception   (intact)   Comments RUE/LE impaired to light touch, but detects all test points; proprioception intact in RLE, absent R hand and wrist, intact proximally  (No extinction to auditory, tactile, visual stimuli; Visual fields intact)   Lower Body Muscle Tone   Lower Body Muscle Tone  X   Rt Lower Extremity Muscle Tone Hypotonic   Comments RUE hypotonic   Balance Assessment   Sitting Balance (Static) Fair -   Sitting Balance (Dynamic) Poor -   Standing Balance (Static) Trace +   Standing Balance (Dynamic) Trace +   Weight Shift Sitting Fair   Weight Shift Standing Poor   Bed Mobility    Supine to Sit Minimal Assist  (steadying assist)   Sit to Supine Minimal Assist  (help c/ RLE)   Sit to Stand Moderate Assist   Scooting Minimal Assist   Rolling Minimal Assist to Rt.;Minimum Assist to Lt.   Neurological Concerns   Neurological Concerns Yes   Footdrop Present  (RLE)   Coordination Lower Body    Comments RLE impaired based on observation of AROM   Roll Left and Right   Assistance Needed Physical assistance   Physical Assistance Level 26%-50%   CARE Score - Roll Left and Right 3   Roll Left and Right Discharge Goal   Discharge Goal 6   Sit to Lying   Assistance Needed Physical assistance   Physical Assistance Level  26%-50%   CARE Score - Sit to Lying 3   Sit to Lying Discharge Goal   Discharge Goal 6   Lying to Sitting on Side of Bed   Assistance Needed Physical assistance   Physical Assistance Level 26%-50%   CARE Score - Lying to Sitting on Side of Bed 3   Lying to Sitting on Side of Bed Discharge Goal   Discharge Goal 6   Sit to Stand   Assistance Needed Physical assistance   Physical Assistance Level 51%-75%   CARE Score - Sit to Stand 2   Sit to Stand Discharge Goal   Discharge Goal 6   Chair/Bed-to-Chair Transfer   Assistance Needed Physical assistance   Physical Assistance Level 51%-75%   CARE Score - Chair/Bed-to-Chair Transfer 2   Chair/Bed-to-Chair Transfer Discharge Goal   Discharge Goal 6   Car Transfer   Assistance Needed Physical assistance   Physical Assistance Level 51%-75%   CARE Score - Car Transfer 2   Car Transfer Discharge Goal   Discharge Goal 4   Walk 10 Feet   Assistance Needed Physical assistance   Physical Assistance Level 76% or more   CARE Score - Walk 10 Feet 2   Walk 10 Feet Discharge Goal   Discharge Goal 4   Walk 50 Feet with Two Turns   Assistance Needed Physical assistance   Physical Assistance Level 76% or more   CARE Score - Walk 50 Feet with Two Turns 2   Walk 50 Feet with Two Turns Discharge Goal   Discharge Goal 4   Walk 150 Feet   Assistance Needed Physical assistance   Physical Assistance Level Total assistance   CARE Score - Walk 150 Feet 1   Walk 150 Feet Discharge Goal   Discharge Goal 4   Walking 10 Feet on Uneven Surfaces   Assistance Needed Physical assistance   Physical Assistance Level Total assistance   CARE Score - Walking 10 Feet on Uneven Surfaces 1   Walking 10 Feet on Uneven Surfaces Discharge Goal   Discharge Goal 4   1 Step (Curb)   Assistance Needed Physical assistance   Physical Assistance Level Total assistance   CARE Score - 1 Step (Curb) 1   1 Step (Curb) Discharge Goal   Discharge Goal 4   4 Steps   Assistance Needed Physical assistance   Physical Assistance Level  Total assistance   CARE Score - 4 Steps 1   4 Steps Discharge Goal   Discharge Goal 4   12 Steps   Assistance Needed Physical assistance   Physical Assistance Level Total assistance   CARE Score - 12 Steps 1   12 Steps Discharge Goal   Discharge Goal 4   Picking Up Object   Assistance Needed Physical assistance   Physical Assistance Level Total assistance   CARE Score - Picking Up Object 1   Picking Up Object Discharge Goal   Discharge Goal 6   Wheel 50 Feet with Two Turns   Assistance Needed Physical assistance   Physical Assistance Level Total assistance   CARE Score - Wheel 50 Feet with Two Turns 1   Wheel 50 Feet with Two Turns Discharge Goal   Discharge Goal 6   Wheel 150 Feet   Assistance Needed Physical assistance   Physical Assistance Level Total assistance   CARE Score - Wheel 150 Feet 1   Wheel 150 Feet Discharge Goal   Discharge Goal 6   Gait Functional Level of Assist    Gait Level Of Assist Maximal Assist   Assistive Device Other (Comments)  (LHR)   Distance (Feet) 60   # of Times Distance was Traveled 1  (1 x 30')   Deviation Step To;Decreased Base Of Support;Decreased Toe Off;Decreased Heel Strike;Shuffled Gait  (step to, crosses midline, poor RLE advancement and foot clearance, help at trunk for midline control, VC/MC for WS and to advance LUE on rail, sequence gait pattern)   Wheelchair Functional Level of Assist   Wheelchair Assist Total Assist   Distance Wheelchair (Feet or Distance) 15   Wheelchair Description Assistance with steering  (needs hand over hand and manual assist to propel c/ RUE on chair, unable to detect hand position to engage with and  wheel)   Stairs Functional Level of Assist   Level of Assist with Stairs Total Assist X 2   # of Stairs Climbed 6   Stairs Description Hand rails;Extra time;Safety concerns;Requires assist to advance foot  (step to pattern ascending and descending, help to steady trunk/control pelvis, and stabilize RLE on step when descending; VC sequencing,  "unable to maintain  RUE on rail during step effort)   Transfer Functional Level of Assist   Bed, Chair, Wheelchair Transfer Moderate Assist   Bed Chair Wheelchair Transfer Description Other (comment);Verbal cueing  (stand pivot c/ steadying, turning, lowering assist and verbal/manual cues for hand placement and sequencing)   Problem List    Problems Pain;Impaired Bed Mobility;Impaired Ambulation;Impaired Transfers;Functional ROM Deficit;Functional Strength Deficit;Impaired Balance;Impaired Coordination;Decreased Activity Tolerance;Motor Planning / Sequencing;Safety Awareness Deficits / Cognition   Precautions   Precautions Fall Risk  (R hemiplegia, R loss of sensation in hand, ICH)   Current Discharge Plan   Current Discharge Plan Return to Prior Living Situation   Interdisciplinary Plan of Care Collaboration   IDT Collaboration with  Occupational Therapist;Family / Caregiver   Patient Position at End of Therapy In Bed;Call Light within Reach;Tray Table within Reach;Phone within Reach   Collaboration Comments CLOF, spoke with  re: POC and CLOF   PT DME Recommendations   Wheelchair 18\" Width;Jaciel Height (16\"-17\");Removable/Flip Back Armrests;Standard Leg Rests;Anti-tippers  (may need specialty chair)   Cushion Standard   Assistive Device Other (Comments)  (TBD pending progress: HW>quad cane>cane)   Additional Equipment Other (Comments)  (TBD)   Physical Therapist Assigned   Assigned PT / Treatment Time / Comments Carmen. 60-90 tx. Tech assist for sessions.   Benefit   Therapy Benefit Patient Would Benefit from Inpatient Rehabilitation Physical Therapy to Maximize Functional Maury with ADLs, IADLs and Mobility.   Strengths & Barriers   Strengths Able to follow instructions;Alert and oriented;Independent prior level of function;Motivated for self care and independence;Pleasant and cooperative;Supportive family;Willingly participates in therapeutic activities   Barriers Decreased endurance;Difficulty " following instructions;Fatigue;Hemiplegia;Home accessibility;Hypertension;Impaired balance;Impaired carryover of learning;Limited mobility  (lives alone, decreased knowledge of condition, limited daytime family support)     Vision screen:  Glasses: wears glasses (bifocals)  Changes in vision: no  Visual fields: intact to kinetic and confrontation testing    Patient education: reviewed PT plan of care, rehab expectations, mobility needs and patient passport, orientation to unit, role of PT, fall risk and use of call light. Also discussed CLOF and prognosis for improvement with pt and  by phone at pt's request.     Assessment    The patient is a 58 y.o. female admitted to [facility] inpatient rehabilitation 5/29/2024 with severe functional debility after L basal ganglia, L thalamic hemorrhagic CVA.    Pre H&P: Latanya Ferrera is a 59 yo female with PMH significant for tobacco use, hypertension who presented to [facility]/23/24 with right arm weakness and leg weakness. Her symptoms began suddenly while at home. Initially she had facial numbness, later noticed arm weakness. She had chills in the days prior. She had been on anti-hypertensives, but had not been taking them for the past couple of months and had not been monitoring her BP. Her BP in ED was in 200's. She had leukocytosis at 12.9. Imaging revealed left intracranial hemorrhage in the L thalamus. This was compatible with hypertensive hemorrhage. No underlying mass or vascular malformations seen. Neurology and NSGY consulted. No neurosurgical intervention after stable repeat imaging. Her hospital course c/b uncontrolled BP now on 3 agents, urinary retention now with otero, leukocytosis now on Rocephin. Code stroke was called 5/28 but evaluation did not reveal anything new.      Patient's PMH includes:  Past Medical History:   Diagnosis Date    Hypertension     Tobacco abuse          PT evaluation performed today; functional performance at today's assessment  is as above. Patient currently performing bed mobility at Min to ModA, transfers c/ stand step method at ModA, gait/stairs at Max to TotalA.     Primary limitations include joint position and sensory impairment in RUE/hand, R hemiparesis impacting effective reach and grasp of RUE, and RLE strength/motor control, and ROM. Pt with R ankle weakness, grossly hypotonic at trunk and RLE, and would likely benefit from anterior shell AFO trial for improved knee stability in stance with careful blocking to prevent hyperextension.     Additional factors influencing patient status and prognosis include: prior level of function, limited family support, and the above comorbidities.     At baseline, pt worked full time and cares for her 7 year old grandson, drives, and ambulates > 10,000 steps/day without devices. Her daughter is main local support as her  is incarcerated but she works full time.     The patient is performing well below their baseline level of function and will benefit from an interdisciplinary high intensity rehabilitation program to maximize functional independence, decrease burden of care, and support a safe return to home with intermittent family support.     Plan  Recommend Physical Therapy  minutes per day 5-7 days per week for 3-4 weeks for the following treatments:  PT Group Therapy, PT E Stim Attended, PT Orthotics Training, PT Gait Training, PT Self Care/Home Eval, PT Therapeutic Exercises, PT TENS Application, PT Neuro Re-Ed/Balance, PT Aquatic Therapy, PT Therapeutic Activity, PT Manual Therapy, and PT Evaluation.    Passport items to be completed:  Get in/out of bed safely, in/out of a vehicle, safely use mobility device, walk or wheel around home/community, navigate up and down stairs, show how to get up/down from the ground, ensure home is accessible, demonstrate HEP, complete caregiver training    Goals:  Long term and short term goals have been discussed with patient and spouse and  they are in agreement.      Physical Therapy Problems (Active)       Problem: Balance       Dates: Start:  05/30/24         Goal: STG-Within one week, patient will maintain static standing c/ 1UE support at SBA or better, >/= 2 minutes.        Dates: Start:  05/30/24               Problem: Mobility       Dates: Start:  05/30/24         Goal: STG-Within one week, patient will ambulate household distances >/= 50' c/ LRAD and TotalA or better.        Dates: Start:  05/30/24               Problem: Mobility Transfers       Dates: Start:  05/30/24         Goal: STG-Within one week, patient will transfer bed to chair c/ Sonia or better c/ appropriate compensatory method (reach pivot vs SPT).       Dates: Start:  05/30/24               Problem: PT-Long Term Goals       Dates: Start:  05/30/24         Goal: LTG-By discharge, patient will ambulate >/= 300' c/ LRAD and CGA or better.        Dates: Start:  05/30/24            Goal: LTG-By discharge, patient will transfer one surface to another c/ Boni.        Dates: Start:  05/30/24            Goal: LTG-By discharge, patient will ambulate up/down 2 stairs c/ CGA or better to access home.        Dates: Start:  05/30/24            Goal: LTG-By discharge, patient will transfer in/out of a car c/ CGA or better.        Dates: Start:  05/30/24

## 2024-05-30 NOTE — PROGRESS NOTES
"  Physical Medicine & Rehabilitation Progress Note    Encounter Date: 5/30/2024    Chief Complaint: doing better    Interval Events (Subjective):  LAURA  BM 5/27  IDFC    Seen and examined in her room. Daughter present. Patient doing well. She states that she worked hard with therapy and that she was able to walk. She gets teary at times, but is improving.       ROS: 14 point ROS negative unless otherwise specified in the HPI    Objective:  VITAL SIGNS: /66   Pulse 82   Temp 36.6 °C (97.8 °F) (Temporal)   Resp 17   Ht 1.676 m (5' 6\")   Wt 82.3 kg (181 lb 8 oz)   SpO2 98%   BMI 29.29 kg/m²     GEN: No apparent distress  HEENT: Head normocephalic, atraumatic.  Sclera nonicteric bilaterally, no ocular discharge appreciated bilaterally.  CV: Extremities warm and well-perfused, no peripheral edema appreciated bilaterally.  PULMONARY: Breathing nonlabored on room air, no respiratory accessory muscle use.  Not requiring supplemental oxygen.  SKIN: No appreciable skin breakdown on exposed areas of skin.  PSYCH: Mood and affect within normal limits.  NEURO: Awake alert. Conversational. Logical thought content. Dysarthric.   : Ramirez with some hematuria      Laboratory Values:  Recent Results (from the past 72 hour(s))   CBC with Differential    Collection Time: 05/30/24  5:42 AM   Result Value Ref Range    WBC 10.7 4.8 - 10.8 K/uL    RBC 3.87 (L) 4.20 - 5.40 M/uL    Hemoglobin 11.8 (L) 12.0 - 16.0 g/dL    Hematocrit 35.7 (L) 37.0 - 47.0 %    MCV 92.2 81.4 - 97.8 fL    MCH 30.5 27.0 - 33.0 pg    MCHC 33.1 32.2 - 35.5 g/dL    RDW 47.0 35.9 - 50.0 fL    Platelet Count 239 164 - 446 K/uL    MPV 10.8 9.0 - 12.9 fL    Neutrophils-Polys 72.30 (H) 44.00 - 72.00 %    Lymphocytes 17.60 (L) 22.00 - 41.00 %    Monocytes 6.90 0.00 - 13.40 %    Eosinophils 2.60 0.00 - 6.90 %    Basophils 0.20 0.00 - 1.80 %    Immature Granulocytes 0.40 0.00 - 0.90 %    Nucleated RBC 0.00 0.00 - 0.20 /100 WBC    Neutrophils (Absolute) 7.75 (H) " 1.82 - 7.42 K/uL    Lymphs (Absolute) 1.88 1.00 - 4.80 K/uL    Monos (Absolute) 0.74 0.00 - 0.85 K/uL    Eos (Absolute) 0.28 0.00 - 0.51 K/uL    Baso (Absolute) 0.02 0.00 - 0.12 K/uL    Immature Granulocytes (abs) 0.04 0.00 - 0.11 K/uL    NRBC (Absolute) 0.00 K/uL   Comp Metabolic Panel (CMP)    Collection Time: 05/30/24  5:42 AM   Result Value Ref Range    Sodium 142 135 - 145 mmol/L    Potassium 3.9 3.6 - 5.5 mmol/L    Chloride 110 96 - 112 mmol/L    Co2 19 (L) 20 - 33 mmol/L    Anion Gap 13.0 7.0 - 16.0    Glucose 109 (H) 65 - 99 mg/dL    Bun 21 8 - 22 mg/dL    Creatinine 0.67 0.50 - 1.40 mg/dL    Calcium 9.0 8.5 - 10.5 mg/dL    Correct Calcium 9.3 8.5 - 10.5 mg/dL    AST(SGOT) 9 (L) 12 - 45 U/L    ALT(SGPT) 11 2 - 50 U/L    Alkaline Phosphatase 85 30 - 99 U/L    Total Bilirubin 0.5 0.1 - 1.5 mg/dL    Albumin 3.6 3.2 - 4.9 g/dL    Total Protein 6.5 6.0 - 8.2 g/dL    Globulin 2.9 1.9 - 3.5 g/dL    A-G Ratio 1.2 g/dL   HEMOGLOBIN A1C    Collection Time: 05/30/24  5:42 AM   Result Value Ref Range    Glycohemoglobin 5.6 4.0 - 5.6 %    Est Avg Glucose 114 mg/dL   Lipid Profile (Lipid Panel)    Collection Time: 05/30/24  5:42 AM   Result Value Ref Range    Cholesterol,Tot 155 100 - 199 mg/dL    Triglycerides 79 0 - 149 mg/dL    HDL 30 (A) >=40 mg/dL     (H) <100 mg/dL   Magnesium    Collection Time: 05/30/24  5:42 AM   Result Value Ref Range    Magnesium 2.2 1.5 - 2.5 mg/dL   Phosphorus    Collection Time: 05/30/24  5:42 AM   Result Value Ref Range    Phosphorus 4.6 (H) 2.5 - 4.5 mg/dL   TSH with Reflex to FT4    Collection Time: 05/30/24  5:42 AM   Result Value Ref Range    TSH 2.240 0.380 - 5.330 uIU/mL   Vitamin D, 25-hydroxy (blood)    Collection Time: 05/30/24  5:42 AM   Result Value Ref Range    25-Hydroxy   Vitamin D 25 7 (L) 30 - 100 ng/mL   ESTIMATED GFR    Collection Time: 05/30/24  5:42 AM   Result Value Ref Range    GFR (CKD-EPI) 102 >60 mL/min/1.73 m 2       Medications:  Scheduled Medications    Medication Dose Frequency    Pharmacy Consult Request  1 Each PHARMACY TO DOSE    omeprazole  20 mg DAILY    senna-docusate  2 Tablet Q EVENING    cefTRIAXone (ROCEPHIN) IV  1,000 mg Q24HRS    amLODIPine  10 mg Q DAY    hydrALAZINE  50 mg 4X/DAY    lisinopril  40 mg Q DAY    tamsulosin  0.4 mg AFTER DINNER     PRN medications: Respiratory Therapy Consult, hydrALAZINE, senna-docusate **AND** polyethylene glycol/lytes, bisacodyl, magnesium hydroxide, acetaminophen, carboxymethylcellulose, benzocaine-menthol, mag hydrox-al hydrox-simeth, ondansetron **OR** ondansetron, traZODone, sodium chloride, ipratropium-albuterol, melatonin    Diet:  Current Diet Order   Procedures    Diet Order Diet: Regular       Medical Decision Making and Plan:  Left Basal Ganglia ICH secondary to hypertensive emergency  Right Hemiparesis  PT and OT for mobility and ADLs. Per guidelines, 15 hours per week between PT, OT and/or SLP.  Follow-up Neurology (Dr. Babb)  Secondary stroke ppx: Anti-hypertensives     Hypertension  Continue Hydralazine 50mg q6 hours  Continue Lisinopril 40mg daily   Continue Amlodipine 10mg daily   5/30 BP stable     Leukocytosis  Improving on admission but unclear cause  Continue Rocephin x 2 doses (through 5/31) - ok to d/c IV after     Urinary Retention  Has otero - required CIC   Continue Flomax 0.4mg nightly   5/30 Per patient preference keep otero another 1-2 days. D/w her r/o CAUTI. Patient and dtr aware.     Hyperphosphatemia  Monitor    Anemia  Mild monitor.      Pain  Tylenol PRN     GI Ppx  Patient on Prilosec for GERD prophylaxis.      Bowel   Patient on Senna-docusate for constipation prophylaxis.         Upcoming Labs/imaging: Admission labs     DVT PROPHYLAXIS: None - Hemorrhagic stroke. Check US - negative. Per Neuro note, clear for DVT chemo-ppx once MRI completed (completed 5/24). Lovenox 40 mg SQ scheduled to start 6/3.      HOSPITALIST FOLLOWING: None     CODE STATUS: FULL CODE     DISPO: Home with  minimal support     VIANCA: TBD     MEDS SENT TO: TBD     DISCHARGE SPECIALIST FOLLOW UP: Neurology     Patient to scheduled follow up with their PCP within 2 weeks from discharge from the [facility].   ____________________________________    Dr. Sofi Jules DO, MS  [facility] - Physical Medicine & Rehabilitation   ____________________________________

## 2024-05-30 NOTE — THERAPY
Speech Language Pathology   Initial Assessment     Patient Name: Latanya Ferrera  AGE:  58 y.o., SEX:  female  Medical Record #: [id number]  Today's Date: 5/30/2024     Subjective    Patient participated in cognitive linguistic evaluation. Patient reports living with spouse and is independent for all IADL's including driving, managing medications, and working part-time in retail.      Objective       05/30/24 0932   Evaluation Charges   SLP Speech Language Evaluation Speech Sound Language Comprehension   SLP Total Time Spent   SLP Individual Total Time Spent (Mins) 60   Prior Living Situation   Prior Services None   Housing / Facility 1 Story House   Lives with - Patient's Self Care Capacity Spouse   Prior Level Of Function   Communication Within Functional Limits   Swallow Within Functional Limits   Dentures Uppers   Hearing Within Functional Limits for Evaluation   Hearing Aid None   Vision Wears Corrective Lenses;Reading    Patient's Primary Language English   Occupation (Pre-Hospital Vocational) Employed Part Time   Confusion Assessment Method (CAM)   Is there evidence of an acute change in mental status from the patient's baseline? Yes   Inattention Behavior present, fluctuates (comes and goes, changes in severity)   Disorganized thinking Behavior present, fluctuates (comes and goes, changes in severity)   Altered level of consciousness Behavior not present   Functional Level of Assist   Comprehension Modified Independent   Comprehension Description Glasses   Expression Independent   Social Interaction Independent   Problem Solving Minimal Assist   Problem Solving Description Verbal cueing;Increased time;Bed/chair alarm   Memory Moderate Assist   Memory Description Verbal cueing;Increased time;Bed/chair alarm;Therapy schedule   Outcome Measures   Outcome Measures Utilized SCCAN   SCCAN (Scales of Cognitive and Communicative Ability for Neurorehabilitation)   Oral Expression - Raw Score 19   Oral Expression - Scale  Performance Score 100   Orientation - Raw Score 12   Orientation - Scale Performance Score 100   Memory - Raw Score 10   Memory - Scale Performance Score 53   Speech Comprehension - Raw Score 13   Speech Comprehension - Scale Performance Score 100   Reading Comprehension - Raw Score 10   Reading Comprehension - Scale Performance Score 83   Writing - Raw Score 6   Writing - Scale Performance Score 86   Attention - Raw Score 14   Attention - Scale Performance Score 88   Problem Solving - Raw Score 19   Problem Solving - Scale Performance Score 83   SCCAN Total Raw Score 80   SCCAN Degree of Severity Mild Impairment   Problem List   Problem List Cognitive-Linguistic Deficits   Current Discharge Plan   Current Discharge Plan Return to Prior Living Situation   Benefit   Therapy Benefit Patient would benefit from Inpatient Rehab Speech-Language Pathology to address above identified deficits.   Interdisciplinary Plan of Care Collaboration   IDT Collaboration with  Physician   Collaboration Comments ANGELICA   Speech Language Pathologist Assigned   Assigned SLP / Treatment Time / Comments EA 60         Assessment    Patient is 58 y.o. female with a diagnosis of hemorrhagic stroke.  Additional factors influencing patient status/progress (ie: cognitive factors, co-morbidities, social support, etc): PMH included HTN, Patient with IPLOF.    Cognitive linguistic evaluation was completed. Patient participated in SCCAN with a final raw score of 80 indicating mild cognitive deficits. Patient demonstrated difficulties in the areas of memory and attention. Would benefit from ST to address cognitive deficits and further assessment of medication and financial management tasks.         Plan  Recommend Speech Therapy 30-60 minutes per day 5-6 days per week for 1-2 weeks for the following treatments:  SLP Speech Language Treatment, SLP Self Care / ADL Training , SLP Cognitive Skill Development, and SLP Group Treatment.    Passport items to be  completed:  Express basic needs, understand food/liquid recommendations, consistently follow swallow precautions, manage finances, manage medications, arrive to therapy appointments on time, complete daily memory log entries, solve problems related to safety situations, review education related to hospitalization, complete caregiver training     Goals:  Long term and short term goals have been discussed with patient and daughter and they are in agreement.    Speech Therapy Problems (Active)       Problem: Memory STGs       Dates: Start:  05/30/24         Goal: STG-Within one week, patient will demonstrate use of memory strategies in order to recall information from therapies after a 2/hr delay.        Dates: Start:  05/30/24               Problem: Problem Solving STGs       Dates: Start:  05/30/24         Goal: STG-Within one week, patient will complete medication management tasks with 80% accuracy, given min verbal cues.        Dates: Start:  05/30/24            Goal: STG-Within one week, patient will complete alternating attention tasks with 80% accuracy given SPV       Dates: Start:  05/30/24               Problem: Speech/Swallowing LTGs       Dates: Start:  05/30/24         Goal: LTG-By discharge, patient will solve complex problems related to IADL's in order to d/c home with mod I        Dates: Start:  05/30/24

## 2024-05-30 NOTE — PROGRESS NOTES
NURSING DAILY NOTE    Name: Latanya Ferrera   Date of Admission: 5/29/2024   Admitting Diagnosis: Hemorrhagic stroke (HCC)  Attending Physician: Sofi Jules D.o.  Allergies: Patient has no allergy information on record.    Safety  Patient Assist     Patient Precautions     Precaution Comments     Bed Transfer Status     Toilet Transfer Status      Assistive Devices     Oxygen  Room air w/o2 available  Diet/Therapeutic Dining  Current Diet Order   Procedures    Diet Order Diet: Regular     Pill Administration  whole  Agitated Behavioral Scale     ABS Level of Severity       Fall Risk  Has the patient had a fall this admission?   No  Carly Fisher Fall Risk Scoring  15, HIGH RISK  Fall Risk Safety Measures  bed alarm and chair alarm    Vitals  Temperature: 37 °C (98.6 °F)  Temp src: Oral  Pulse: 83  Respiration: 18  Blood Pressure: 125/65  Blood Pressure MAP (Calculated): 85 MM HG  BP Location: Right, Upper Arm  Patient BP Position: Lying Right Side, Supine     Oxygen  Pulse Oximetry: 97 %  O2 Delivery Device: Room air w/o2 available    Bowel and Bladder  Last Bowel Movement  05/27/24  Stool Type     Bowel Device     Continent  Bladder: Did not void   Bowel: No movement  Bladder Function     Genitourinary Assessment   Bladder Assessment (WDL):  WDL Except  Ramirez Catheter: Present with Active Order  Ramirez Reasons per MD Order: Acute urinary retention or bladder outlet obstruction    Skin  Xavier Score   17  Sensory Interventions   Bed Types: Standard/Trauma Mattress  Skin Preventative Measures: Pillows in Use for Support / Positioning, Waffle Overlay  Moisture Interventions  Containment Devices: Indwelling Catheter      Pain  Pain Rating Scale  3 - Sometimes distracts me  Pain Location  Head  Pain Location Orientation     Pain Interventions   Medication (see MAR)    ADLs    Bathing      Linen Change      Personal Hygiene     Chlorhexidine Bath      Oral  Insurance verification submitted for year 2022  Care     Teeth/Dentures     Shave     Nutrition Percentage Eaten     Environmental Precautions     Patient Turns/Positioning  Patient Turns Self from Side to Side  Patient Turns Assistance/Tolerance     Bed Positions     Head of Bed Elevated         Psychosocial/Neurologic Assessment  Psychosocial Assessment  Psychosocial (WDL):  Within Defined Limits  Family Behaviors: Family Present  Neurologic Assessment  Level of Consciousness: Alert  Orientation Level: Oriented X4  Cognition: Appropriate judgement, Follows commands  Speech: Clear  Motor Function/Sensation Assessment: Sensation, Motor strength  RUE Sensation: Numbness  Muscle Strength Right Arm: Fair Strength against Gravity but No Resistance  LUE Sensation: Full sensation  Muscle Strength Left Arm: Normal Strength Against Gravity and Full Resistance  RLE Sensation: Numbness  Muscle Strength Right Leg: Fair Strength against Gravity but No Resistance  LLE Sensation: Full sensation  Muscle Strength Left Leg: Normal Strength Against Gravity and Full Resistance       Cardio/Pulmonary Assessment  Edema      Respiratory Breath Sounds  RUL Breath Sounds: Clear  RML Breath Sounds: Clear  RLL Breath Sounds: Diminished  OZIEL Breath Sounds: Clear  LLL Breath Sounds: Diminished  Cardiac Assessment   Cardiac (WDL):  WDL Except

## 2024-05-30 NOTE — DISCHARGE PLANNING
CASE MANAGEMENT INITIAL ASSESSMENT    Admit Date:  5/29/2024     I met with patient to discuss role of case management / discharge planning / team conference.   Patient is a  58 y.o. female transferred from outside hospital.       Diagnosis: Hemorrhagic stroke (HCC) [I61.9]    Co-morbidities:   Patient Active Problem List    Diagnosis Date Noted    Hemorrhagic stroke (HCC) 05/29/2024    Leukocytosis 05/29/2024    Urinary retention 05/29/2024    Primary hypertension 05/29/2024     Prior Living Situation:       Prior Level of Function:       Support Systems:  Primary : Alcira Tobar-daughter: [phone].       Previous Services Utilized:        Other Information:        Primary Payor Source:  ([facility])    Patient / Family Goal:  Patient / Family Goal: Return Home    Plan:  1. Continue to follow patient through hospitalization and provide discharge planning in collaboration with patient, family, physicians and ancillary services.     2. Utilize community resources to ensure a safe discharge.

## 2024-05-31 ENCOUNTER — APPOINTMENT (OUTPATIENT)
Dept: SPEECH THERAPY | Facility: REHABILITATION | Age: 59
DRG: 057 | End: 2024-05-31
Attending: PHYSICAL MEDICINE & REHABILITATION
Payer: MEDICAID

## 2024-05-31 ENCOUNTER — APPOINTMENT (OUTPATIENT)
Dept: OCCUPATIONAL THERAPY | Facility: REHABILITATION | Age: 59
DRG: 057 | End: 2024-05-31
Attending: PHYSICAL MEDICINE & REHABILITATION
Payer: MEDICAID

## 2024-05-31 ENCOUNTER — APPOINTMENT (OUTPATIENT)
Dept: PHYSICAL THERAPY | Facility: REHABILITATION | Age: 59
DRG: 057 | End: 2024-05-31
Attending: PHYSICAL MEDICINE & REHABILITATION
Payer: MEDICAID

## 2024-05-31 VITALS
HEIGHT: 66 IN | WEIGHT: 181.5 LBS | OXYGEN SATURATION: 98 % | SYSTOLIC BLOOD PRESSURE: 116 MMHG | TEMPERATURE: 97.9 F | RESPIRATION RATE: 18 BRPM | DIASTOLIC BLOOD PRESSURE: 59 MMHG | HEART RATE: 85 BPM | BODY MASS INDEX: 29.17 KG/M2

## 2024-05-31 PROCEDURE — 97530 THERAPEUTIC ACTIVITIES: CPT

## 2024-05-31 PROCEDURE — 700102 HCHG RX REV CODE 250 W/ 637 OVERRIDE(OP): Performed by: PHYSICAL MEDICINE & REHABILITATION

## 2024-05-31 PROCEDURE — 97530 THERAPEUTIC ACTIVITIES: CPT | Mod: CQ

## 2024-05-31 PROCEDURE — 97116 GAIT TRAINING THERAPY: CPT | Mod: CQ

## 2024-05-31 PROCEDURE — 770010 HCHG ROOM/CARE - REHAB SEMI PRIVAT*

## 2024-05-31 PROCEDURE — 97130 THER IVNTJ EA ADDL 15 MIN: CPT

## 2024-05-31 PROCEDURE — 97129 THER IVNTJ 1ST 15 MIN: CPT

## 2024-05-31 PROCEDURE — 97112 NEUROMUSCULAR REEDUCATION: CPT | Mod: CQ

## 2024-05-31 PROCEDURE — A9270 NON-COVERED ITEM OR SERVICE: HCPCS | Performed by: PHYSICAL MEDICINE & REHABILITATION

## 2024-05-31 PROCEDURE — 99232 SBSQ HOSP IP/OBS MODERATE 35: CPT | Performed by: PHYSICAL MEDICINE & REHABILITATION

## 2024-05-31 RX ORDER — GABAPENTIN 300 MG/1
300 CAPSULE ORAL 3 TIMES DAILY
Status: DISCONTINUED | OUTPATIENT
Start: 2024-05-31 | End: 2024-06-03

## 2024-05-31 RX ORDER — CEFDINIR 300 MG/1
300 CAPSULE ORAL EVERY 12 HOURS
Status: COMPLETED | OUTPATIENT
Start: 2024-05-31 | End: 2024-05-31

## 2024-05-31 RX ADMIN — TAMSULOSIN HYDROCHLORIDE 0.4 MG: 0.4 CAPSULE ORAL at 17:01

## 2024-05-31 RX ADMIN — DOCUSATE SODIUM 200 MG: 100 CAPSULE, LIQUID FILLED ORAL at 21:18

## 2024-05-31 RX ADMIN — GABAPENTIN 300 MG: 300 CAPSULE ORAL at 21:18

## 2024-05-31 RX ADMIN — DOCUSATE SODIUM 200 MG: 100 CAPSULE, LIQUID FILLED ORAL at 08:17

## 2024-05-31 RX ADMIN — HYDRALAZINE HYDROCHLORIDE 50 MG: 50 TABLET ORAL at 17:01

## 2024-05-31 RX ADMIN — ACETAMINOPHEN 500 MG: 500 TABLET, FILM COATED ORAL at 21:20

## 2024-05-31 RX ADMIN — CEFDINIR 300 MG: 300 CAPSULE ORAL at 21:18

## 2024-05-31 RX ADMIN — GABAPENTIN 300 MG: 300 CAPSULE ORAL at 15:27

## 2024-05-31 RX ADMIN — ACETAMINOPHEN 500 MG: 500 TABLET, FILM COATED ORAL at 11:09

## 2024-05-31 RX ADMIN — HYDRALAZINE HYDROCHLORIDE 50 MG: 50 TABLET ORAL at 21:18

## 2024-05-31 RX ADMIN — HYDRALAZINE HYDROCHLORIDE 50 MG: 50 TABLET ORAL at 13:57

## 2024-05-31 RX ADMIN — MAGNESIUM HYDROXIDE 30 ML: 1200 LIQUID ORAL at 08:17

## 2024-05-31 RX ADMIN — Medication 5000 UNITS: at 08:17

## 2024-05-31 RX ADMIN — SENNOSIDES AND DOCUSATE SODIUM 2 TABLET: 50; 8.6 TABLET ORAL at 21:18

## 2024-05-31 RX ADMIN — LISINOPRIL 40 MG: 20 TABLET ORAL at 05:14

## 2024-05-31 RX ADMIN — AMLODIPINE BESYLATE 10 MG: 5 TABLET ORAL at 05:14

## 2024-05-31 RX ADMIN — OMEPRAZOLE 20 MG: 20 CAPSULE, DELAYED RELEASE ORAL at 08:17

## 2024-05-31 RX ADMIN — CEFDINIR 300 MG: 300 CAPSULE ORAL at 11:09

## 2024-05-31 RX ADMIN — HYDRALAZINE HYDROCHLORIDE 50 MG: 50 TABLET ORAL at 08:17

## 2024-05-31 ASSESSMENT — ACTIVITIES OF DAILY LIVING (ADL)
BED_CHAIR_WHEELCHAIR_TRANSFER_DESCRIPTION: SET-UP OF EQUIPMENT;SQUAT PIVOT TRANSFER TO WHEELCHAIR;SUPERVISION FOR SAFETY;VERBAL CUEING
BED_CHAIR_WHEELCHAIR_TRANSFER_DESCRIPTION: INITIAL PREPARATION FOR TASK;SET-UP OF EQUIPMENT;SQUAT PIVOT TRANSFER TO WHEELCHAIR;SUPERVISION FOR SAFETY;VERBAL CUEING
TOILET_TRANSFER_DESCRIPTION: GRAB BAR;INITIAL PREPARATION FOR TASK;SET-UP OF EQUIPMENT;SUPERVISION FOR SAFETY;VERBAL CUEING

## 2024-05-31 ASSESSMENT — GAIT ASSESSMENTS
ASSISTIVE DEVICE: OTHER (COMMENTS)
GAIT LEVEL OF ASSIST: TOTAL ASSIST X 2

## 2024-05-31 NOTE — DISCHARGE PLANNING
CM met with patient and her mother was at BS.  Explained would have Team conference on Monday and would have updates for her regarding DC/DC needs.  CM available as needs arise.

## 2024-05-31 NOTE — THERAPY
Physical Therapy   Daily Treatment     Patient Name: Latanya Ferrera  Age:  58 y.o., Sex:  female  Medical Record #: [id number]  Today's Date: 5/31/2024     Precautions  Precautions: Fall Risk (R hemiplegia, R loss of sensation in hand, ICH)    Subjective    Pt was in bed upon arrival, requested to use the restroom.     Objective       05/31/24 0701   PT Charge Group   PT Gait Training (Units) 1   PT Neuromuscular Re-Education / Balance (Units) 2   PT Therapeutic Activities (Units) 1   Supervising Physical Therapist Christina Fisher   PT Total Time Spent   PT Individual Total Time Spent (Mins) 60   Cognition    Level of Consciousness Alert   Safety Awareness Impaired   Gait Functional Level of Assist    Gait Level Of Assist Total Assist X 2  (modA w/ w/c follow for safety)   Assistive Device Other (Comments)  (L rail)   Distance (Feet)   (10+30)   # of Times Distance was Traveled 2   Deviation Step To;Decreased Base Of Support;Decreased Toe Off;Decreased Heel Strike;Shuffled Gait;Ataxic  (cues for RLE placement, low tone)   Transfer Functional Level of Assist   Bed, Chair, Wheelchair Transfer Moderate Assist   Bed Chair Wheelchair Transfer Description Initial preparation for task;Set-up of equipment;Squat pivot transfer to wheelchair;Supervision for safety;Verbal cueing   Toilet Transfers Moderate Assist   Toilet Transfer Description Grab bar;Initial preparation for task;Set-up of equipment;Supervision for safety;Verbal cueing  (assisted w/ LB dressing)   Bed Mobility    Supine to Sit Minimal Assist  (HOBE)   Sit to Stand Minimal Assist   Scooting Minimal Assist   Interdisciplinary Plan of Care Collaboration   IDT Collaboration with  Therapy Tech   Patient Position at End of Therapy Seated  (in front of the sink, RN notified)   Collaboration Comments tech assisted     Education provided to patient regarding purpose of  FES bike. Assessed parameters for stimulation to R quads, hamstrings, tib anterior, gluteus and  "gastroc; see patient profile for details of parameters. Palpated muscle contraction and monitored patient's body language/facial responses to ensure comfortable stimulation parameters set.               Total distance 1.8  miles              Average power 3.4 W              Average asymmetry R7 %              Total therapy time: 16:09 minutes              Time active: 09:00 minutes   Skin checked after removing electrodes, no ABN signs.       Assessment    Pt tolerated session well on pre and post gait training w/ . Pt demonstrated good muscle contraction w/ stimulation and she stated her sensation is coming back. Post  she was able to clear RLE better from the floor. Added into NFC list.       Strengths: Able to follow instructions, Alert and oriented, Independent prior level of function, Motivated for self care and independence, Pleasant and cooperative, Supportive family, Willingly participates in therapeutic activities  Barriers: Decreased endurance, Difficulty following instructions, Fatigue, Hemiplegia, Home accessibility, Hypertension, Impaired balance, Impaired carryover of learning, Limited mobility (lives alone, decreased knowledge of condition, limited daytime family support)    Plan    Ambulation w/ L rail > jaciel walker  NMRE RLE  Strengthening and endurance  Mat program  Aquatic therapy when otero is out.    DME  PT DME Recommendations  Wheelchair: 18\" Width, Jaciel Height (16\"-17\"), Removable/Flip Back Armrests, Standard Leg Rests, Anti-tippers (may need specialty chair)  Cushion: Standard  Assistive Device: Other (Comments) (TBD pending progress: HW>quad cane>cane)  Additional Equipment: Other (Comments) (TBD)    Passport items to be completed:  Get in/out of bed safely, in/out of a vehicle, safely use mobility device, walk or wheel around home/community, navigate up and down stairs, show how to get up/down from the ground, ensure home is accessible, demonstrate HEP, complete caregiver " training    Physical Therapy Problems (Active)       Problem: Balance       Dates: Start:  05/30/24         Goal: STG-Within one week, patient will maintain static standing c/ 1UE support at SBA or better, >/= 2 minutes.        Dates: Start:  05/30/24               Problem: Mobility       Dates: Start:  05/30/24         Goal: STG-Within one week, patient will ambulate household distances >/= 50' c/ LRAD and TotalA or better.        Dates: Start:  05/30/24               Problem: Mobility Transfers       Dates: Start:  05/30/24         Goal: STG-Within one week, patient will transfer bed to chair c/ Sonia or better c/ appropriate compensatory method (reach pivot vs SPT).       Dates: Start:  05/30/24               Problem: PT-Long Term Goals       Dates: Start:  05/30/24         Goal: LTG-By discharge, patient will ambulate >/= 300' c/ LRAD and CGA or better.        Dates: Start:  05/30/24            Goal: LTG-By discharge, patient will transfer one surface to another c/ Boni.        Dates: Start:  05/30/24            Goal: LTG-By discharge, patient will ambulate up/down 2 stairs c/ CGA or better to access home.        Dates: Start:  05/30/24            Goal: LTG-By discharge, patient will transfer in/out of a car c/ CGA or better.        Dates: Start:  05/30/24

## 2024-05-31 NOTE — PROGRESS NOTES
"  Physical Medicine & Rehabilitation Progress Note    Encounter Date: 5/31/2024    Chief Complaint: Weakness    Interval Events (Subjective):  Seen in bed. IV no longer patent. Will finish antibiotic with oral medication. Can take out IV. No new concerns of dyuria or SOB or cough. Tolerating therapy. Left arm and leg neuropathic pain    Objective:  VITAL SIGNS: /81   Pulse 86   Temp 36.5 °C (97.7 °F) (Oral)   Resp 17   Ht 1.676 m (5' 6\")   Wt 82.3 kg (181 lb 8 oz)   SpO2 100%   BMI 29.29 kg/m²   Gen: No acute distress, well developed well nourished adult  HEENT: Normal Cephalic Atraumatic, Normal conjunctiva.   CV: warm extremities, well perfused, no edema  Resp: symmetric chest rise, breathing comfortably on room air  Abd: Soft, Non distended  Extremities: normal bulk, no atrophy  Skin: no visible rashes or lesions.   Neuro: alert, awake  Psych: Mood and affect appropriate and congruent    Laboratory Values:  Recent Results (from the past 72 hour(s))   CBC with Differential    Collection Time: 05/30/24  5:42 AM   Result Value Ref Range    WBC 10.7 4.8 - 10.8 K/uL    RBC 3.87 (L) 4.20 - 5.40 M/uL    Hemoglobin 11.8 (L) 12.0 - 16.0 g/dL    Hematocrit 35.7 (L) 37.0 - 47.0 %    MCV 92.2 81.4 - 97.8 fL    MCH 30.5 27.0 - 33.0 pg    MCHC 33.1 32.2 - 35.5 g/dL    RDW 47.0 35.9 - 50.0 fL    Platelet Count 239 164 - 446 K/uL    MPV 10.8 9.0 - 12.9 fL    Neutrophils-Polys 72.30 (H) 44.00 - 72.00 %    Lymphocytes 17.60 (L) 22.00 - 41.00 %    Monocytes 6.90 0.00 - 13.40 %    Eosinophils 2.60 0.00 - 6.90 %    Basophils 0.20 0.00 - 1.80 %    Immature Granulocytes 0.40 0.00 - 0.90 %    Nucleated RBC 0.00 0.00 - 0.20 /100 WBC    Neutrophils (Absolute) 7.75 (H) 1.82 - 7.42 K/uL    Lymphs (Absolute) 1.88 1.00 - 4.80 K/uL    Monos (Absolute) 0.74 0.00 - 0.85 K/uL    Eos (Absolute) 0.28 0.00 - 0.51 K/uL    Baso (Absolute) 0.02 0.00 - 0.12 K/uL    Immature Granulocytes (abs) 0.04 0.00 - 0.11 K/uL    NRBC (Absolute) 0.00 " K/uL   Comp Metabolic Panel (CMP)    Collection Time: 05/30/24  5:42 AM   Result Value Ref Range    Sodium 142 135 - 145 mmol/L    Potassium 3.9 3.6 - 5.5 mmol/L    Chloride 110 96 - 112 mmol/L    Co2 19 (L) 20 - 33 mmol/L    Anion Gap 13.0 7.0 - 16.0    Glucose 109 (H) 65 - 99 mg/dL    Bun 21 8 - 22 mg/dL    Creatinine 0.67 0.50 - 1.40 mg/dL    Calcium 9.0 8.5 - 10.5 mg/dL    Correct Calcium 9.3 8.5 - 10.5 mg/dL    AST(SGOT) 9 (L) 12 - 45 U/L    ALT(SGPT) 11 2 - 50 U/L    Alkaline Phosphatase 85 30 - 99 U/L    Total Bilirubin 0.5 0.1 - 1.5 mg/dL    Albumin 3.6 3.2 - 4.9 g/dL    Total Protein 6.5 6.0 - 8.2 g/dL    Globulin 2.9 1.9 - 3.5 g/dL    A-G Ratio 1.2 g/dL   HEMOGLOBIN A1C    Collection Time: 05/30/24  5:42 AM   Result Value Ref Range    Glycohemoglobin 5.6 4.0 - 5.6 %    Est Avg Glucose 114 mg/dL   Lipid Profile (Lipid Panel)    Collection Time: 05/30/24  5:42 AM   Result Value Ref Range    Cholesterol,Tot 155 100 - 199 mg/dL    Triglycerides 79 0 - 149 mg/dL    HDL 30 (A) >=40 mg/dL     (H) <100 mg/dL   Magnesium    Collection Time: 05/30/24  5:42 AM   Result Value Ref Range    Magnesium 2.2 1.5 - 2.5 mg/dL   Phosphorus    Collection Time: 05/30/24  5:42 AM   Result Value Ref Range    Phosphorus 4.6 (H) 2.5 - 4.5 mg/dL   TSH with Reflex to FT4    Collection Time: 05/30/24  5:42 AM   Result Value Ref Range    TSH 2.240 0.380 - 5.330 uIU/mL   Vitamin D, 25-hydroxy (blood)    Collection Time: 05/30/24  5:42 AM   Result Value Ref Range    25-Hydroxy   Vitamin D 25 7 (L) 30 - 100 ng/mL   ESTIMATED GFR    Collection Time: 05/30/24  5:42 AM   Result Value Ref Range    GFR (CKD-EPI) 102 >60 mL/min/1.73 m 2       Medications:  Scheduled Medications   Medication Dose Frequency    cefdinir  300 mg Q12HRS    docusate sodium  200 mg BID    vitamin D3  5,000 Units DAILY    [START ON 6/3/2024] enoxaparin (LOVENOX) injection  40 mg DAILY AT 1800    Pharmacy Consult Request  1 Each PHARMACY TO DOSE    omeprazole  20  mg DAILY    senna-docusate  2 Tablet Q EVENING    amLODIPine  10 mg Q DAY    hydrALAZINE  50 mg 4X/DAY    lisinopril  40 mg Q DAY    tamsulosin  0.4 mg AFTER DINNER     PRN medications: Respiratory Therapy Consult, hydrALAZINE, senna-docusate **AND** polyethylene glycol/lytes, bisacodyl, magnesium hydroxide, acetaminophen, carboxymethylcellulose, benzocaine-menthol, mag hydrox-al hydrox-simeth, ondansetron **OR** ondansetron, traZODone, sodium chloride, ipratropium-albuterol, melatonin    Diet:  Current Diet Order   Procedures    Diet Order Diet: Regular       Medical Decision Making and Plan:  Left Basal Ganglia ICH secondary to hypertensive emergency  Right Hemiparesis  PT and OT for mobility and ADLs. Per guidelines, 15 hours per week between PT, OT and/or SLP.  Follow-up Neurology (Dr. 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 Give last days dose as omnicef 300mg BID through 5/31     Urinary Retention  Has otero - required CIC   Continue Flomax 0.4mg nightly   5/30 Per patient preference keep otero another 1-2 days. D/w her r/o CAUTI. Patient and dtr aware.      Hyperphosphatemia  Monitor     Anemia  Mild monitor.      Pain  Tylenol PRN     GI Ppx  Patient on Prilosec for GERD prophylaxis.      Bowel   Patient on Senna-docusate for constipation prophylaxis.         Upcoming Labs/imaging: Admission labs     DVT PROPHYLAXIS: None - Hemorrhagic stroke. Check US - negative. Per Neuro note, clear for DVT chemo-ppx once MRI completed (completed 5/24). Continue to hold lovenox.  Restart monday    ____________________________________    [facility]  Physical Medicine & Rehabilitation   Brain Injury Medicine   ____________________________________

## 2024-05-31 NOTE — THERAPY
Occupational Therapy  Daily Treatment     Patient Name: Latanya Ferrera  Age:  58 y.o., Sex:  female  Medical Record #: [id number]  Today's Date: 5/31/2024     Precautions  Precautions: Fall Risk  Comments: R hemiplegia, R loss of sensation in hand, ICH         Subjective    Patient was napping in bed, but easily awoken.  She was agreeable to OT.  She became emotional at one point during session when she said she was here due to having a stroke.     Objective       05/31/24 1231   OT Charge Group   OT Therapy Activity (Units) 2   OT Total Time Spent   OT Individual Total Time Spent (Mins) 30   Precautions   Precautions Fall Risk   Functional Level of Assist   Bed, Chair, Wheelchair Transfer Contact Guard Assist   Bed Chair Wheelchair Transfer Description Set-up of equipment;Squat pivot transfer to wheelchair;Supervision for safety;Verbal cueing   Hand Strengthening   Comment issued pink resistive foam for R hand gross grass strengthening\   Bed Mobility    Supine to Sit Standby Assist   Scooting Contact Guard Assist   Outcome Measures   Outcome Measures Utilized [place] Manual Dexterity Test   [place] Manual Dexterity Test   Placing Test Right Attempted with R UE, but patient was unable to pick them up so activity was stopped   Interdisciplinary Plan of Care Collaboration   Patient Position at End of Therapy Seated;Chair Alarm On;Self Releasing Lap Belt Applied  (hand off to SLP)     Used R UE to  cones from her right and stack them on her left, then vice versa.  She used a gross grasp with cues to decrease compensatory L lean/R shoulder hike.  Cues given to exaggerate her finger extension prior to grasping cones and when she was releasing them.    Assessment    Patient tolerated all activities well and demonstrates good motivation to improve.    Strengths: Able to follow instructions, Good insight into deficits/needs, Independent prior level of function, Manages pain appropriately, Motivated for self care and  independence, Pleasant and cooperative, Supportive family, Willingly participates in therapeutic activities  Barriers: Decreased endurance, Hemiparesis, Home accessibility, Impaired activity tolerance, Impaired balance (Impaired coordination/motor control)    Plan    ADLs, safety with transfers, neuro re-ed R UE (needs initial setup for ), standing tolerance/balance    Occupational Therapy Goals (Active)       Problem: Bathing       Dates: Start:  05/30/24         Goal: STG-Within one week, patient will bathe with MIN A and LRAD       Dates: Start:  05/30/24               Problem: Dressing       Dates: Start:  05/30/24         Goal: STG-Within one week, patient will dress LB with CGA and LRAD       Dates: Start:  05/30/24               Problem: Functional Cognition       Dates: Start:  05/30/24         Goal: STG-Within one week, patient will attend to R arm and incorporate into functional tasks with min cues       Dates: Start:  05/30/24               Problem: Functional Transfers       Dates: Start:  05/30/24         Goal: STG-Within one week, patient will transfer to toilet with CGA and LRAD       Dates: Start:  05/30/24            Goal: STG-Within one week, patient will transfer to tub/shower with CGA and LRAD       Dates: Start:  05/30/24               Problem: OT Long Term Goals       Dates: Start:  05/30/24         Goal: LTG-By discharge, patient will complete basic self care tasks with SPV-MOD I and LRAD       Dates: Start:  05/30/24            Goal: LTG-By discharge, patient will perform bathroom transfers with SPV-MOD I and LRAD       Dates: Start:  05/30/24

## 2024-05-31 NOTE — THERAPY
Speech Language Pathology  Daily Treatment     Patient Name: Latanya Ferrera  Age:  58 y.o., Sex:  female  Medical Record #: [id number]  Today's Date: 5/31/2024     Precautions  Precautions: Fall Risk  Comments: R hemiplegia, R loss of sensation in hand, ICH    Subjective    Patient pleasant and agreeable to SLP services.     Objective       05/31/24 1301   Treatment Charges   SLP Cognitive Skill Development First 15 Minutes 1   SLP Cognitive Skill Development Additional 15 Minutes 1   SLP Total Time Spent   SLP Individual Total Time Spent (Mins) 30   Precautions   Precautions Fall Risk   Comments R hemiplegia, R loss of sensation in hand, ICH   Interdisciplinary Plan of Care Collaboration   IDT Collaboration with  Occupational Therapist   Patient Position at End of Therapy In Bed;Bed Alarm On;Call Light within Reach;Tray Table within Reach;Phone within Reach   Collaboration Comments Received patient from OT         Assessment    Stroke education completed this session with specific focus on stroke disease processes and presentations, potential risk factors for stroke, and how to prevent future cerebrovascular accidents. Patient verbalized understanding of educational materials and expressed motivation to get well and return home. Patient became tearful when discussing missing her grandsons birthday party, but expressed she has been feeling overwhelmed. Discussed possible emotion lability r/t stroke; patient verbalized understanding.    Plan    Target memory, attention.    Passport items to be completed:  Express basic needs, understand food/liquid recommendations, consistently follow swallow precautions, manage finances, manage medications, arrive to therapy appointments on time, complete daily memory log entries, solve problems related to safety situations, review education related to hospitalization, complete caregiver training     Speech Therapy Problems (Active)       Problem: Memory STGs       Dates: Start:   05/30/24         Goal: STG-Within one week, patient will demonstrate use of memory strategies in order to recall information from therapies after a 2/hr delay.        Dates: Start:  05/30/24               Problem: Problem Solving STGs       Dates: Start:  05/30/24         Goal: STG-Within one week, patient will complete medication management tasks with 80% accuracy, given min verbal cues.        Dates: Start:  05/30/24            Goal: STG-Within one week, patient will complete alternating attention tasks with 80% accuracy given SPV       Dates: Start:  05/30/24               Problem: Speech/Swallowing LTGs       Dates: Start:  05/30/24         Goal: LTG-By discharge, patient will solve complex problems related to IADL's in order to d/c home with mod I        Dates: Start:  05/30/24

## 2024-05-31 NOTE — THERAPY
Speech Language Pathology  Daily Treatment     Patient Name: Latanya Ferrera  Age:  58 y.o., Sex:  female  Medical Record #: [id number]  Today's Date: 5/31/2024     Precautions  Precautions: Fall Risk  Comments: R hemiplegia, R loss of sensation in hand, ICH    Subjective    Pt pleasant and cooperative, somewhat tearful re: CLOF and stroke.      Objective       05/31/24 0934   Treatment Charges   Charges Yes   SLP Cognitive Skill Development First 15 Minutes 1   SLP Cognitive Skill Development Additional 15 Minutes 3   SLP Total Time Spent   SLP Individual Total Time Spent (Mins) 60   Cognition   Cognitive-Linguistic (WDL) X   Functional Math / Financial Management Minimal (4)   Interdisciplinary Plan of Care Collaboration   Patient Position at End of Therapy In Bed;Call Light within Reach;Tray Table within Reach         Assessment    Initiated medication list with SLP acting as scribe, reports taking a blood pressure medication previously, however, had been non compliant due to lack of insurance coverage most recently. Education provided on name and purpose of each medication. Pt would benefit from fxl sorting task.    Initiated simple money management, pt works as a  at [employer]. Pt able to calculate simple money (who has more coins) again, with SLP acting as scribe. 90% accuracy IND.          Plan    Continue med and $ mngt.     Passport items to be completed:  Express basic needs, understand food/liquid recommendations, consistently follow swallow precautions, manage finances, manage medications, arrive to therapy appointments on time, complete daily memory log entries, solve problems related to safety situations, review education related to hospitalization, complete caregiver training     Speech Therapy Problems (Active)       Problem: Memory STGs       Dates: Start:  05/30/24         Goal: STG-Within one week, patient will demonstrate use of memory strategies in order to recall information from  therapies after a 2/hr delay.        Dates: Start:  05/30/24               Problem: Problem Solving STGs       Dates: Start:  05/30/24         Goal: STG-Within one week, patient will complete medication management tasks with 80% accuracy, given min verbal cues.        Dates: Start:  05/30/24            Goal: STG-Within one week, patient will complete alternating attention tasks with 80% accuracy given SPV       Dates: Start:  05/30/24               Problem: Speech/Swallowing LTGs       Dates: Start:  05/30/24         Goal: LTG-By discharge, patient will solve complex problems related to IADL's in order to d/c home with mod I        Dates: Start:  05/30/24

## 2024-05-31 NOTE — PROGRESS NOTES
NURSING DAILY NOTE    Name: Latanya Ferrera   Date of Admission: 5/29/2024   Admitting Diagnosis: Hemorrhagic stroke (HCC)  Attending Physician: Sofi Jules D.o.  Allergies: Patient has no known allergies.    Safety  Patient Assist     Patient Precautions  Fall Risk (R hemiplegia, R loss of sensation in hand, ICH)  Precaution Comments     Bed Transfer Status  Moderate Assist  Toilet Transfer Status   Moderate Assist (Stand pivot transfer)  Assistive Devices  Wheelchair, Rails  Oxygen  None - Room Air  Diet/Therapeutic Dining  Current Diet Order   Procedures    Diet Order Diet: Regular     Pill Administration  whole  Agitated Behavioral Scale     ABS Level of Severity       Fall Risk  Has the patient had a fall this admission?   No  Carly Fisher Fall Risk Scoring  15, HIGH RISK  Fall Risk Safety Measures  bed alarm, chair alarm, and poor balance    Vitals  Temperature: 36.7 °C (98.1 °F)  Temp src: Oral  Pulse: 76  Respiration: 17  Blood Pressure: 110/64  Blood Pressure MAP (Calculated): 79 MM HG  BP Location: Right, Upper Arm  Patient BP Position: Supine     Oxygen  Pulse Oximetry: 95 %  O2 (LPM): 0  O2 Delivery Device: None - Room Air    Bowel and Bladder  Last Bowel Movement  05/27/24  Stool Type     Bowel Device     Continent  Bladder: Did not void   Bowel: No movement  Bladder Function     Genitourinary Assessment   Bladder Assessment (WDL):  WDL Except  Ramirez Catheter: Present with Active Order  Ramirez Reasons per MD Order: Acute urinary retention or bladder outlet obstruction  Ramirez Care: Given with Soap and Water  Urinary Elimination: Catheter (Document on LDA)  Bladder Device: Indwelling Catheter  Bladder Medications: Yes    Skin  Xavier Score   17  Sensory Interventions   Bed Types: Standard/Trauma Mattress  Skin Preventative Measures: Waffle Overlay, Pillows in Use for Support / Positioning  Moisture Interventions  Containment Devices: Indwelling  Catheter      Pain  Pain Rating Scale  3 - Sometimes distracts me  Pain Location  Sternum  Pain Location Orientation  Right  Pain Interventions   Medication (see MAR)    ADLs    Bathing      Linen Change   Partial  Personal Hygiene     Chlorhexidine Bath      Oral Care     Teeth/Dentures     Shave     Nutrition Percentage Eaten  Dinner, Between 50-75% Consumed  Environmental Precautions     Patient Turns/Positioning  Patient Turns Self from Side to Side  Patient Turns Assistance/Tolerance  Tolerates Well  Bed Positions     Head of Bed Elevated         Psychosocial/Neurologic Assessment  Psychosocial Assessment  Psychosocial (WDL):  WDL Except  Patient Behaviors: Fatigue  Family Behaviors: Family Present  Neurologic Assessment  Neuro (WDL): Exceptions to WDL  Level of Consciousness: Alert  Orientation Level: Oriented X4  Cognition: Appropriate judgement, Appropriate safety awareness, Follows commands  Speech: Clear  Motor Function/Sensation Assessment: Sensation, Motor strength  RUE Sensation: Numbness  Muscle Strength Right Arm: Fair Strength against Gravity but No Resistance  LUE Sensation: Full sensation  Muscle Strength Left Arm: Normal Strength Against Gravity and Full Resistance  RLE Sensation: Numbness  Muscle Strength Right Leg: Fair Strength against Gravity but No Resistance  LLE Sensation: Full sensation  Muscle Strength Left Leg: Normal Strength Against Gravity and Full Resistance  EENT (WDL):  Within Defined Limits    Cardio/Pulmonary Assessment  Edema   RLE Edema: Trace  LLE Edema: Trace  Respiratory Breath Sounds  RUL Breath Sounds: Clear  RML Breath Sounds: Clear  RLL Breath Sounds: Diminished  OZIEL Breath Sounds: Clear  LLL Breath Sounds: Diminished  Cardiac Assessment   Cardiac (WDL):  WDL Except

## 2024-05-31 NOTE — DISCHARGE PLANNING
Late entry:    Yesterday:     KAVIN was told by patients RN that patients daughter lAcira wanted a call from CM.  CM called Alcira.  She wanted to know DC date and plan.  KAVIN explained [facility] team would have patients first conference on Monday 5/3/24 and would know DC date after that time.  She requested a letter for work.  CM will update after conference on Monday.  CM available as needs arise.     Patient : Hiral Wild Age: 21 year old Sex: female   MRN: [id number] Encounter Date: 5/6/2020      History     Chief Complaint   Patient presents with   • Laceration     HPI the patient has a history of self-mutilation behavior.  She was seen last night for the same.  She was using sharps to cut her left forearm.  The patient states that Steri-Strips were placed over 1 of the cuts however one popped open this morning and reportedly was bleeding on and off and oozing some yellow stuff so she was concerned about it and she came to the emergency department for further evaluation.    No Known Allergies    Current Discharge Medication List      Prior to Admission Medications    Details   amLODIPine (NORVASC) 10 MG tablet Take 1 tablet by mouth daily.  Qty: 30 tablet, Refills: 1      escitalopram (LEXAPRO) 10 MG tablet Take 1 tablet by mouth daily.  Qty: 30 tablet, Refills: 3      acetaminophen (TYLENOL) 500 MG tablet Take 1,000 mg by mouth every 6 hours as needed for Pain.      hydrOXYzine (ATARAX) 25 MG tablet Take 1 tablet by mouth 3 times daily as needed for Anxiety.  Qty: 90 tablet, Refills: 0      escitalopram (LEXAPRO) 10 MG tablet Take 1.5 tablets by mouth daily.  Qty: 45 tablet, Refills: 1      rizatriptan (MAXALT) 5 MG tablet Take 1 tablet by mouth as needed for Migraine. May repeat in 2 hours if needed. Max dose of 2 tablets daily.  Qty: 10 tablet, Refills: 0      albuterol (PROAIR HFA) 108 (90 Base) MCG/ACT inhaler Inhale 1 puff into the lungs every 4 hours as needed for Shortness of Breath or Wheezing.  Qty: 8.5 g, Refills: 0      ibuprofen (MOTRIN) 600 MG tablet Take 1 tablet by mouth every 6 hours as needed for Pain. Take medication with food.  Qty: 28 tablet, Refills: 0      omeprazole (PRILOSEC) 10 MG capsule TAKE 1 CAPSULE BY MOUTH DAILY. SWALLOW WHOLE-DO NOT SPLIT, CRUSH OR CHEW BEFORE SWALLOWING.  Qty: 30 capsule, Refills: 2             Current Discharge Medication List          Past Medical History:    Diagnosis Date   • ADHD (attention deficit hyperactivity disorder)    • Concussion    • Exercise-induced asthma    • High blood pressure    • Raynaud's disease    • Seizure (CMS/HCC)     from concussion       Past Surgical History:   Procedure Laterality Date   • HERNIA REPAIR      5 hernia repairs       Family History   Problem Relation Age of Onset   • Patient is unaware of any medical problems Mother    • Asthma Father    • High blood pressure Father    • Anxiety disorder Sister    • Asthma Brother        Social History     Tobacco Use   • Smoking status: Current Every Day Smoker     Packs/day: 0.25     Years: 3.00     Pack years: 0.75     Types: Cigarettes   • Smokeless tobacco: Never Used   • Tobacco comment: States quit smoking 2 months ago   Substance Use Topics   • Alcohol use: Yes     Frequency: 2-4 times a month     Drinks per session: 3 or 4     Binge frequency: Never     Comment: once in awhile   • Drug use: Yes     Types: Marijuana     Comment: once a month for anxiety        Review of Systems   Skin: Positive for wound.   Allergic/Immunologic: Negative for immunocompromised state.   Psychiatric/Behavioral:        Denies active suicidal or homicidal ideation.  Has been in contact with Bautista valladares for her behavioral health problems       Physical Exam     ED Triage Vitals [05/06/20 1811]   ED Triage Vitals Group      Temp 98.1 °F (36.7 °C)      Heart Rate 89      Resp 18      BP (!) 157/103      SpO2 99 %      EtCO2 mmHg       Height 5' 5\" (1.651 m)      Weight 148 lb (67.1 kg)      Weight Scale Used ED Stated      BMI (Calculated) 24.63      IBW/kg (Calculated) 57       Physical Exam   Constitutional: She is oriented to person, place, and time. She appears well-developed and well-nourished.   HENT:   Head: Normocephalic.   Eyes: Conjunctivae are normal.   Neck: Neck supple.   Cardiovascular: Intact distal pulses.   Pulmonary/Chest: Effort normal. No respiratory distress.   Lymphadenopathy:     She  has no cervical adenopathy.   Neurological: She is alert and oriented to person, place, and time. No cranial nerve deficit. She exhibits normal muscle tone. Coordination normal.   Skin: Skin is warm.   Her left forearm has evidence of multiple self-mutilation wounds.  On the most proximate area at the patient has a approximately 2-3 cm laceration and the edges are spread around 3 mm and wound is healing.   Psychiatric: She has a normal mood and affect.   Good eye contact.  Denies active suicidal or homicidal ideation   Nursing note and vitals reviewed.      ED Course     Procedures    Lab Results     No results found for this visit on 05/06/20.    EKG Results       Radiology Results     Imaging Results    None         ED Medication Orders (From admission, onward)    Ordered Start     Status Ordering Provider    05/06/20 1829 05/06/20 1830  bacitracin-polymyxin B (POLYSPORIN) [id number] UNIT/GM ointment  ONCE      Ordered SWEETIE PERALES               MDM:  The patient's left forearm wound is already healing.  At this time I am not going to place Steri-Strips on it.  We will clean it and covered with antibiotic ointment and dressing.  The patient understands wound care and reasons to return to the ER    Clinical Impression     ED Diagnosis   1. Encounter for wound re-check         Disposition        Discharge 5/6/2020  6:31 PM  Hiral Wild discharge to home/self care.           Sweetie Perales PA-C  05/06/20 1831

## 2024-06-01 ENCOUNTER — APPOINTMENT (OUTPATIENT)
Dept: SPEECH THERAPY | Facility: REHABILITATION | Age: 59
DRG: 057 | End: 2024-06-01
Attending: PHYSICAL MEDICINE & REHABILITATION
Payer: MEDICAID

## 2024-06-01 ENCOUNTER — APPOINTMENT (OUTPATIENT)
Dept: OCCUPATIONAL THERAPY | Facility: REHABILITATION | Age: 59
DRG: 057 | End: 2024-06-01
Attending: PHYSICAL MEDICINE & REHABILITATION
Payer: MEDICAID

## 2024-06-01 PROCEDURE — 97032 APPL MODALITY 1+ESTIM EA 15: CPT

## 2024-06-01 PROCEDURE — 700102 HCHG RX REV CODE 250 W/ 637 OVERRIDE(OP): Performed by: PHYSICAL MEDICINE & REHABILITATION

## 2024-06-01 PROCEDURE — 99232 SBSQ HOSP IP/OBS MODERATE 35: CPT | Performed by: GENERAL PRACTICE

## 2024-06-01 PROCEDURE — 97530 THERAPEUTIC ACTIVITIES: CPT

## 2024-06-01 PROCEDURE — 97130 THER IVNTJ EA ADDL 15 MIN: CPT | Performed by: SPEECH-LANGUAGE PATHOLOGIST

## 2024-06-01 PROCEDURE — A9270 NON-COVERED ITEM OR SERVICE: HCPCS | Performed by: PHYSICAL MEDICINE & REHABILITATION

## 2024-06-01 PROCEDURE — 770010 HCHG ROOM/CARE - REHAB SEMI PRIVAT*

## 2024-06-01 PROCEDURE — 97112 NEUROMUSCULAR REEDUCATION: CPT

## 2024-06-01 PROCEDURE — 97129 THER IVNTJ 1ST 15 MIN: CPT | Performed by: SPEECH-LANGUAGE PATHOLOGIST

## 2024-06-01 RX ADMIN — BISACODYL 10 MG: 10 SUPPOSITORY RECTAL at 05:33

## 2024-06-01 RX ADMIN — LISINOPRIL 40 MG: 20 TABLET ORAL at 05:33

## 2024-06-01 RX ADMIN — HYDRALAZINE HYDROCHLORIDE 50 MG: 50 TABLET ORAL at 17:45

## 2024-06-01 RX ADMIN — ACETAMINOPHEN 500 MG: 500 TABLET, FILM COATED ORAL at 19:40

## 2024-06-01 RX ADMIN — Medication 5000 UNITS: at 08:55

## 2024-06-01 RX ADMIN — OMEPRAZOLE 20 MG: 20 CAPSULE, DELAYED RELEASE ORAL at 08:56

## 2024-06-01 RX ADMIN — GABAPENTIN 300 MG: 300 CAPSULE ORAL at 15:05

## 2024-06-01 RX ADMIN — SENNOSIDES AND DOCUSATE SODIUM 2 TABLET: 50; 8.6 TABLET ORAL at 19:40

## 2024-06-01 RX ADMIN — HYDRALAZINE HYDROCHLORIDE 50 MG: 50 TABLET ORAL at 08:55

## 2024-06-01 RX ADMIN — GABAPENTIN 300 MG: 300 CAPSULE ORAL at 19:40

## 2024-06-01 RX ADMIN — DOCUSATE SODIUM 200 MG: 100 CAPSULE, LIQUID FILLED ORAL at 19:40

## 2024-06-01 RX ADMIN — HYDRALAZINE HYDROCHLORIDE 50 MG: 50 TABLET ORAL at 19:40

## 2024-06-01 RX ADMIN — AMLODIPINE BESYLATE 10 MG: 5 TABLET ORAL at 05:32

## 2024-06-01 RX ADMIN — DOCUSATE SODIUM 200 MG: 100 CAPSULE, LIQUID FILLED ORAL at 08:56

## 2024-06-01 RX ADMIN — HYDRALAZINE HYDROCHLORIDE 50 MG: 50 TABLET ORAL at 12:47

## 2024-06-01 RX ADMIN — TAMSULOSIN HYDROCHLORIDE 0.4 MG: 0.4 CAPSULE ORAL at 17:46

## 2024-06-01 RX ADMIN — GABAPENTIN 300 MG: 300 CAPSULE ORAL at 08:56

## 2024-06-01 ASSESSMENT — PAIN DESCRIPTION - PAIN TYPE: TYPE: ACUTE PAIN

## 2024-06-01 ASSESSMENT — ACTIVITIES OF DAILY LIVING (ADL): BED_CHAIR_WHEELCHAIR_TRANSFER_DESCRIPTION: SET-UP OF EQUIPMENT;SUPERVISION FOR SAFETY;VERBAL CUEING

## 2024-06-01 NOTE — THERAPY
Speech Language Pathology  Daily Treatment     Patient Name: Latanya Ferrera  Age:  58 y.o., Sex:  female  Medical Record #: [id number]  Today's Date: 6/1/2024     Precautions  Precautions: Fall Risk  Comments: R hemiplegia, R loss of sensation in hand, ICH    Subjective    Pt pleasant and cooperative during ST session.      Objective       06/01/24 1401   Treatment Charges   SLP Cognitive Skill Development First 15 Minutes 1   SLP Cognitive Skill Development Additional 15 Minutes 3   SLP Total Time Spent   SLP Individual Total Time Spent (Mins) 60   Cognition   Cognitive-Linguistic (WDL) X   Attention to Task Minimal (4)   Moderate Attention Minimal (4)   Verbal Short Term Memory 2 Hours   Functional Memory Activities Minimal (4)   Functional Math / Financial Management Minimal (4)   Medication Management  Supervision (5)   Interdisciplinary Plan of Care Collaboration   IDT Collaboration with  Certified Nursing Assistant   Patient Position at End of Therapy In Bed;Call Light within Reach;Tray Table within Reach;Phone within Reach;Bed Alarm On   Collaboration Comments CLOF         Assessment    Moderate attention task completed 'who has more?' With dollars and cents- 100% acc for math calculations, SPV cues for attention.  Pt completed mock medication sort with one error- placed PM med at noon in four time daily medication box. Pt able to correct IND once error ID'd.  Daily log initiated w/ pt recalling all meals, visitors IND and requiring MIN A to recall PT activity.     Plan    Repeat mock med sort, cont to address recall, attention.    Passport items to be completed:  Express basic needs, understand food/liquid recommendations, consistently follow swallow precautions, manage finances, manage medications, arrive to therapy appointments on time, complete daily memory log entries, solve problems related to safety situations, review education related to hospitalization, complete caregiver training     Speech Therapy  Problems (Active)       Problem: Memory STGs       Dates: Start:  05/30/24         Goal: STG-Within one week, patient will demonstrate use of memory strategies in order to recall information from therapies after a 2/hr delay.        Dates: Start:  05/30/24               Problem: Problem Solving STGs       Dates: Start:  05/30/24         Goal: STG-Within one week, patient will complete medication management tasks with 80% accuracy, given min verbal cues.        Dates: Start:  05/30/24            Goal: STG-Within one week, patient will complete alternating attention tasks with 80% accuracy given SPV       Dates: Start:  05/30/24               Problem: Speech/Swallowing LTGs       Dates: Start:  05/30/24         Goal: LTG-By discharge, patient will solve complex problems related to IADL's in order to d/c home with mod I        Dates: Start:  05/30/24

## 2024-06-01 NOTE — THERAPY
Occupational Therapy  Daily Treatment     Patient Name: Latanya Ferrera  Age:  58 y.o., Sex:  female  Medical Record #: [id number]  Today's Date: 6/1/2024     Precautions  Precautions: (P) Fall Risk  Comments: R hemiplegia, R loss of sensation in hand, ICH         Subjective    Patient was lying in bed and ready for OT.  She reported being excited for therapy today.  Was looking forward to  with ALEXIS HILTON.     Objective       06/01/24 1031   OT Charge Group   OT Electrical Stimulation Attended (Units) 1   OT Neuromuscular Re-education / Balance (Units) 1   OT Therapy Activity (Units) 2   OT Total Time Spent   OT Individual Total Time Spent (Mins) 60   Precautions   Precautions Fall Risk   Functional Level of Assist   Bed, Chair, Wheelchair Transfer Minimal Assist   Bed Chair Wheelchair Transfer Description Set-up of equipment;Supervision for safety;Verbal cueing  (min A /CGA SPT bed <> w/c; bedrail used for w/c to bed)   Bed Mobility    Supine to Sit Standby Assist   Sit to Supine Minimal Assist  (for R LE)   Scooting Contact Guard Assist   Interdisciplinary Plan of Care Collaboration   Patient Position at End of Therapy In Bed;Call Light within Reach;Tray Table within Reach;Phone within Reach;Bed Alarm On     Initial  setup completed with R UE.  Time taken for positioning in w/c and with .  Time taken for muscle testing.      Distance travelled: 2.17 miles  Energy expended: 0.2 kCal  Energy per hour: 0.7 kCal/hour  Average Power: 0.9 W  Average Assymetry: 0%  Total Therapy time: 23:11   Time Active: 07:01   Time Passive: 16:10    Assessment    Patient tolerated  setup and use well with only complaint being fatigue.  No reports of discomfort or pain.  Strengths: Able to follow instructions, Good insight into deficits/needs, Independent prior level of function, Manages pain appropriately, Motivated for self care and independence, Pleasant and cooperative, Supportive family, Willingly participates in  therapeutic activities  Barriers: Decreased endurance, Hemiparesis, Home accessibility, Impaired activity tolerance, Impaired balance (Impaired coordination/motor control)    Plan      ADLs, safety with transfers, neuro re-ed R UE, standing tolerance/balance,  3x/week with therapy techs for adjunct therapy    Occupational Therapy Goals (Active)       Problem: Bathing       Dates: Start:  05/30/24         Goal: STG-Within one week, patient will bathe with MIN A and LRAD       Dates: Start:  05/30/24               Problem: Dressing       Dates: Start:  05/30/24         Goal: STG-Within one week, patient will dress LB with CGA and LRAD       Dates: Start:  05/30/24               Problem: Functional Cognition       Dates: Start:  05/30/24         Goal: STG-Within one week, patient will attend to R arm and incorporate into functional tasks with min cues       Dates: Start:  05/30/24               Problem: Functional Transfers       Dates: Start:  05/30/24         Goal: STG-Within one week, patient will transfer to toilet with CGA and LRAD       Dates: Start:  05/30/24            Goal: STG-Within one week, patient will transfer to tub/shower with CGA and LRAD       Dates: Start:  05/30/24               Problem: OT Long Term Goals       Dates: Start:  05/30/24         Goal: LTG-By discharge, patient will complete basic self care tasks with SPV-MOD I and LRAD       Dates: Start:  05/30/24            Goal: LTG-By discharge, patient will perform bathroom transfers with SPV-MOD I and LRAD       Dates: Start:  05/30/24

## 2024-06-01 NOTE — PROGRESS NOTES
NURSING DAILY NOTE    Name: Latanya Ferrera   Date of Admission: 5/29/2024   Admitting Diagnosis: Hemorrhagic stroke (HCC)  Attending Physician: Huy Huang M.d.  Allergies: Patient has no known allergies.    Safety  Patient Assist     Patient Precautions  Fall Risk  Precaution Comments  R hemiplegia, R loss of sensation in hand, ICH  Bed Transfer Status  Contact Guard Assist  Toilet Transfer Status   Moderate Assist  Assistive Devices  Wheelchair, Rails  Oxygen  None - Room Air  Diet/Therapeutic Dining  Current Diet Order   Procedures    Diet Order Diet: Regular     Pill Administration  whole  Agitated Behavioral Scale     ABS Level of Severity       Fall Risk  Has the patient had a fall this admission?   No  Carly Fisher Fall Risk Scoring  16, HIGH RISK  Fall Risk Safety Measures  bed alarm, chair alarm, seatbelt alarm, and poor balance    Vitals  Temperature: 36.8 °C (98.2 °F)  Temp src: Oral  Pulse: 74  Respiration: 17  Blood Pressure: 115/61  Blood Pressure MAP (Calculated): 79 MM HG  BP Location: Left, Upper Arm  Patient BP Position: Supine     Oxygen  Pulse Oximetry: 95 %  O2 (LPM): 0  O2 Delivery Device: None - Room Air    Bowel and Bladder  Last Bowel Movement  05/27/24 (prn milk of mag given)  Stool Type     Bowel Device     Continent  Bladder: Did not void   Bowel: No movement  Bladder Function     Genitourinary Assessment   Bladder Assessment (WDL):  WDL Except  Ramirez Catheter: Present with Active Order  Ramirez Reasons per MD Order: Acute urinary retention or bladder outlet obstruction  Ramirez Care: Given with Soap and Water  Urinary Elimination: Catheter (Document on LDA)  Bladder Device: Indwelling Catheter  Bladder Medications: Yes    Skin  Xavier Score   17  Sensory Interventions   Bed Types: Standard/Trauma Mattress  Skin Preventative Measures: Waffle Overlay  Moisture Interventions  Moisturizers/Barriers: Barrier Wipes  Containment Devices:  Indwelling Catheter      Pain  Pain Rating Scale  4 - Distracts me, can do usual activities  Pain Location  Arm, Leg  Pain Location Orientation  Right  Pain Interventions   Declines    ADLs    Bathing   Family / Significant Other  Linen Change   Partial  Personal Hygiene     Chlorhexidine Bath      Oral Care  Brushed Teeth  Teeth/Dentures     Shave     Nutrition Percentage Eaten  Dinner, Between 50-75% Consumed  Environmental Precautions     Patient Turns/Positioning  Patient Turns Self from Side to Side  Patient Turns Assistance/Tolerance  Tolerates Well  Bed Positions     Head of Bed Elevated         Psychosocial/Neurologic Assessment  Psychosocial Assessment  Psychosocial (WDL):  WDL Except  Patient Behaviors: Fatigue  Family Behaviors: Family Present  Neurologic Assessment  Neuro (WDL): Exceptions to WDL  Level of Consciousness: Alert  Orientation Level: Oriented X4  Cognition: Follows commands, Appropriate attention/concentration  Speech: Clear  Motor Function/Sensation Assessment: Sensation, Motor strength  RUE Sensation: Numbness  Muscle Strength Right Arm: Fair Strength against Gravity but No Resistance  LUE Sensation: Full sensation  Muscle Strength Left Arm: Normal Strength Against Gravity and Full Resistance  RLE Sensation: Numbness  Muscle Strength Right Leg: Fair Strength against Gravity but No Resistance  LLE Sensation: Full sensation  Muscle Strength Left Leg: Normal Strength Against Gravity and Full Resistance  EENT (WDL):  Within Defined Limits    Cardio/Pulmonary Assessment  Edema   RLE Edema: Trace  LLE Edema: Trace  Respiratory Breath Sounds  RUL Breath Sounds: Clear  RML Breath Sounds: Clear  RLL Breath Sounds: Diminished  OZIEL Breath Sounds: Clear  LLL Breath Sounds: Diminished  Cardiac Assessment   Cardiac (WDL):  WDL Except

## 2024-06-01 NOTE — PROGRESS NOTES
"  Physical Medicine & Rehabilitation Progress Note    Encounter Date: 6/1/2024    Chief Complaint: Hemorrhagic stroke (HCC) [I61.9]    Interval Events (Subjective):  Acute problems/issues reported by staff: none    Acute problems/issues reported by patient: none    Gabapentin 300 mg 3 times a day has been effective.  Tolerating Ramirez. participating in therapy. Pain controlled. PO tolerant.      ROS: Denies dizziness, blurry vision, shortness of breath,  bowel changes.    Medications:  Scheduled Medications   Medication Dose Frequency    gabapentin  300 mg TID    docusate sodium  200 mg BID    vitamin D3  5,000 Units DAILY    [START ON 6/3/2024] enoxaparin (LOVENOX) injection  40 mg DAILY AT 1800    Pharmacy Consult Request  1 Each PHARMACY TO DOSE    omeprazole  20 mg DAILY    senna-docusate  2 Tablet Q EVENING    amLODIPine  10 mg Q DAY    hydrALAZINE  50 mg 4X/DAY    lisinopril  40 mg Q DAY    tamsulosin  0.4 mg AFTER DINNER     PRN medications: Respiratory Therapy Consult, hydrALAZINE, senna-docusate **AND** polyethylene glycol/lytes, bisacodyl, magnesium hydroxide, acetaminophen, carboxymethylcellulose, benzocaine-menthol, mag hydrox-al hydrox-simeth, ondansetron **OR** ondansetron, traZODone, sodium chloride, ipratropium-albuterol, melatonin    Diet:  Current Diet Order   Procedures    Diet Order Diet: Regular       Objective:  VITAL SIGNS: /61   Pulse 74   Temp 36.8 °C (98.2 °F) (Oral)   Resp 17   Ht 1.676 m (5' 6\")   Wt 82.3 kg (181 lb 8 oz)   SpO2 95%   BMI 29.29 kg/m²     General:  No acute distress.     HEENT: normocephalic, atraumatic, sclera and conjunctiva normal, normal neck ROM   Respiratory:  non-labored respirations. full sentences. clear to auscultation bilaterally   Cardiovascular:  Regular rate and rhythm. peripheral perfusion normal   Abdomen: soft, non-tender, non distended, bowel sounds normal   Extremities: no edema; no bilateral calf pain.    Genitourinary: Ramirez present with no " obvious blood  Mental status/ Cognition:  Alert. Appropriate responses    Psychiatric: normal affect.  Cooperative.        Laboratory Values:  Recent Results (from the past 72 hour(s))   CBC with Differential    Collection Time: 05/30/24  5:42 AM   Result Value Ref Range    WBC 10.7 4.8 - 10.8 K/uL    RBC 3.87 (L) 4.20 - 5.40 M/uL    Hemoglobin 11.8 (L) 12.0 - 16.0 g/dL    Hematocrit 35.7 (L) 37.0 - 47.0 %    MCV 92.2 81.4 - 97.8 fL    MCH 30.5 27.0 - 33.0 pg    MCHC 33.1 32.2 - 35.5 g/dL    RDW 47.0 35.9 - 50.0 fL    Platelet Count 239 164 - 446 K/uL    MPV 10.8 9.0 - 12.9 fL    Neutrophils-Polys 72.30 (H) 44.00 - 72.00 %    Lymphocytes 17.60 (L) 22.00 - 41.00 %    Monocytes 6.90 0.00 - 13.40 %    Eosinophils 2.60 0.00 - 6.90 %    Basophils 0.20 0.00 - 1.80 %    Immature Granulocytes 0.40 0.00 - 0.90 %    Nucleated RBC 0.00 0.00 - 0.20 /100 WBC    Neutrophils (Absolute) 7.75 (H) 1.82 - 7.42 K/uL    Lymphs (Absolute) 1.88 1.00 - 4.80 K/uL    Monos (Absolute) 0.74 0.00 - 0.85 K/uL    Eos (Absolute) 0.28 0.00 - 0.51 K/uL    Baso (Absolute) 0.02 0.00 - 0.12 K/uL    Immature Granulocytes (abs) 0.04 0.00 - 0.11 K/uL    NRBC (Absolute) 0.00 K/uL   Comp Metabolic Panel (CMP)    Collection Time: 05/30/24  5:42 AM   Result Value Ref Range    Sodium 142 135 - 145 mmol/L    Potassium 3.9 3.6 - 5.5 mmol/L    Chloride 110 96 - 112 mmol/L    Co2 19 (L) 20 - 33 mmol/L    Anion Gap 13.0 7.0 - 16.0    Glucose 109 (H) 65 - 99 mg/dL    Bun 21 8 - 22 mg/dL    Creatinine 0.67 0.50 - 1.40 mg/dL    Calcium 9.0 8.5 - 10.5 mg/dL    Correct Calcium 9.3 8.5 - 10.5 mg/dL    AST(SGOT) 9 (L) 12 - 45 U/L    ALT(SGPT) 11 2 - 50 U/L    Alkaline Phosphatase 85 30 - 99 U/L    Total Bilirubin 0.5 0.1 - 1.5 mg/dL    Albumin 3.6 3.2 - 4.9 g/dL    Total Protein 6.5 6.0 - 8.2 g/dL    Globulin 2.9 1.9 - 3.5 g/dL    A-G Ratio 1.2 g/dL   HEMOGLOBIN A1C    Collection Time: 05/30/24  5:42 AM   Result Value Ref Range    Glycohemoglobin 5.6 4.0 - 5.6 %    Est  Avg Glucose 114 mg/dL   Lipid Profile (Lipid Panel)    Collection Time: 05/30/24  5:42 AM   Result Value Ref Range    Cholesterol,Tot 155 100 - 199 mg/dL    Triglycerides 79 0 - 149 mg/dL    HDL 30 (A) >=40 mg/dL     (H) <100 mg/dL   Magnesium    Collection Time: 05/30/24  5:42 AM   Result Value Ref Range    Magnesium 2.2 1.5 - 2.5 mg/dL   Phosphorus    Collection Time: 05/30/24  5:42 AM   Result Value Ref Range    Phosphorus 4.6 (H) 2.5 - 4.5 mg/dL   TSH with Reflex to FT4    Collection Time: 05/30/24  5:42 AM   Result Value Ref Range    TSH 2.240 0.380 - 5.330 uIU/mL   Vitamin D, 25-hydroxy (blood)    Collection Time: 05/30/24  5:42 AM   Result Value Ref Range    25-Hydroxy   Vitamin D 25 7 (L) 30 - 100 ng/mL   ESTIMATED GFR    Collection Time: 05/30/24  5:42 AM   Result Value Ref Range    GFR (CKD-EPI) 102 >60 mL/min/1.73 m 2       Imaging:  None    Medical Decision Making and Plan:  Reviewed primary care team's plan.      Rehab - Functional status reviewed briefly and progressing towards goals. 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    Neuro   Left Basal Ganglia ICH secondary to hypertensive emergency  Right Hemiparesis  PT and OT for mobility and ADLs. Per guidelines, 15 hours per week between PT, OT and/or SLP.  Follow-up Neurology (Dr. Babb)  Secondary stroke ppx: Anti-hypertensives    Ortho -  continue management plan of primary care team to include pain management plan     Cardiac/Pulm - monitor clinical presentation and vitals; continue management plan of primary care team      GI -will monitor bowel movements and adjust medications accordingly        Urinary Retention  Has otero - required CIC   Continue Flomax 0.4mg nightly   5/30 Per patient preference keep otero another 1-2 days. D/w her r/o CAUTI. Patient and dtr aware.    DVT px - cont prophylaxis     Dispo - per  primary team       I, Yon Vasquez DO, personally performed a complete drug regimen review and no potential clinically significant medication issues were identified.  ____________________________________    Yon Vasquez DO  Physical Medicine and Rehabilitation  [facility]  ____________________________________

## 2024-06-02 ENCOUNTER — APPOINTMENT (OUTPATIENT)
Dept: SPEECH THERAPY | Facility: REHABILITATION | Age: 59
DRG: 057 | End: 2024-06-02
Attending: PHYSICAL MEDICINE & REHABILITATION
Payer: MEDICAID

## 2024-06-02 PROCEDURE — 700102 HCHG RX REV CODE 250 W/ 637 OVERRIDE(OP): Performed by: PHYSICAL MEDICINE & REHABILITATION

## 2024-06-02 PROCEDURE — A9270 NON-COVERED ITEM OR SERVICE: HCPCS | Performed by: PHYSICAL MEDICINE & REHABILITATION

## 2024-06-02 PROCEDURE — 770010 HCHG ROOM/CARE - REHAB SEMI PRIVAT*

## 2024-06-02 PROCEDURE — 97129 THER IVNTJ 1ST 15 MIN: CPT

## 2024-06-02 PROCEDURE — 97130 THER IVNTJ EA ADDL 15 MIN: CPT

## 2024-06-02 RX ADMIN — AMLODIPINE BESYLATE 10 MG: 5 TABLET ORAL at 05:43

## 2024-06-02 RX ADMIN — HYDRALAZINE HYDROCHLORIDE 50 MG: 50 TABLET ORAL at 20:52

## 2024-06-02 RX ADMIN — DOCUSATE SODIUM 200 MG: 100 CAPSULE, LIQUID FILLED ORAL at 20:52

## 2024-06-02 RX ADMIN — Medication 5000 UNITS: at 08:53

## 2024-06-02 RX ADMIN — LISINOPRIL 40 MG: 20 TABLET ORAL at 05:43

## 2024-06-02 RX ADMIN — GABAPENTIN 300 MG: 300 CAPSULE ORAL at 08:52

## 2024-06-02 RX ADMIN — TAMSULOSIN HYDROCHLORIDE 0.4 MG: 0.4 CAPSULE ORAL at 17:41

## 2024-06-02 RX ADMIN — ACETAMINOPHEN 500 MG: 500 TABLET, FILM COATED ORAL at 13:21

## 2024-06-02 RX ADMIN — GABAPENTIN 300 MG: 300 CAPSULE ORAL at 20:52

## 2024-06-02 RX ADMIN — HYDRALAZINE HYDROCHLORIDE 50 MG: 50 TABLET ORAL at 08:52

## 2024-06-02 RX ADMIN — HYDRALAZINE HYDROCHLORIDE 50 MG: 50 TABLET ORAL at 17:42

## 2024-06-02 RX ADMIN — GABAPENTIN 300 MG: 300 CAPSULE ORAL at 13:22

## 2024-06-02 RX ADMIN — HYDRALAZINE HYDROCHLORIDE 50 MG: 50 TABLET ORAL at 13:21

## 2024-06-02 RX ADMIN — SENNOSIDES AND DOCUSATE SODIUM 2 TABLET: 50; 8.6 TABLET ORAL at 20:52

## 2024-06-02 RX ADMIN — OMEPRAZOLE 20 MG: 20 CAPSULE, DELAYED RELEASE ORAL at 08:53

## 2024-06-02 RX ADMIN — ACETAMINOPHEN 500 MG: 500 TABLET, FILM COATED ORAL at 05:42

## 2024-06-02 RX ADMIN — ACETAMINOPHEN 500 MG: 500 TABLET, FILM COATED ORAL at 20:52

## 2024-06-02 RX ADMIN — DOCUSATE SODIUM 200 MG: 100 CAPSULE, LIQUID FILLED ORAL at 08:52

## 2024-06-02 ASSESSMENT — PAIN DESCRIPTION - PAIN TYPE
TYPE: ACUTE PAIN
TYPE: ACUTE PAIN

## 2024-06-02 NOTE — PROGRESS NOTES
NURSING DAILY NOTE    Name: Latanya Ferrera   Date of Admission: 5/29/2024   Admitting Diagnosis: Hemorrhagic stroke (HCC)  Attending Physician: Huy Huang M.d.  Allergies: Patient has no known allergies.    Safety  Patient Assist     Patient Precautions  Fall Risk  Precaution Comments  R hemiplegia, R loss of sensation in hand, ICH  Bed Transfer Status  Minimal Assist  Toilet Transfer Status   Moderate Assist  Assistive Devices  Rails, Wheelchair  Oxygen  None - Room Air  Diet/Therapeutic Dining  Current Diet Order   Procedures    Diet Order Diet: Regular     Pill Administration  whole  Agitated Behavioral Scale     ABS Level of Severity       Fall Risk  Has the patient had a fall this admission?   No  Carly Fisher Fall Risk Scoring  16, HIGH RISK  Fall Risk Safety Measures  bed alarm, chair alarm, seatbelt alarm, and poor balance    Vitals  Temperature: 36.5 °C (97.7 °F)  Temp src: Oral  Pulse: 77  Respiration: 16  Blood Pressure: 110/59  Blood Pressure MAP (Calculated): 76 MM HG  BP Location: Left, Upper Arm  Patient BP Position: Supine     Oxygen  Pulse Oximetry: 100 %  O2 (LPM): 0  O2 Delivery Device: None - Room Air    Bowel and Bladder  Last Bowel Movement  06/01/24  Stool Type  Type 2: Sausage shaped, but lumpy  Bowel Device     Continent  Bladder: Did not void   Bowel: No movement  Bladder Function  Urine Void (mL): 750 ml  Urinary Options: Yes  Urine Color: Yellow  Genitourinary Assessment   Bladder Assessment (WDL):  WDL Except  Ramirez Catheter: Present with Active Order  Ramirez Reasons per MD Order: Acute urinary retention or bladder outlet obstruction  Ramirez Care: Given with Soap and Water  Urinary Elimination: Catheter (Document on LDA)  Urine Color: Yellow  Bladder Device: Indwelling Catheter  Bladder Medications: Yes    Skin  Xavier Score   17  Sensory Interventions   Bed Types: Standard/Trauma Mattress  Skin Preventative Measures: Waffle  Overlay  Moisture Interventions  Moisturizers/Barriers: Barrier Wipes  Containment Devices: Indwelling Catheter      Pain  Pain Rating Scale  4 - Distracts me, can do usual activities  Pain Location  Leg  Pain Location Orientation  Right  Pain Interventions   Medication (see MAR)    ADLs    Bathing   Family / Significant Other  Linen Change   Partial  Personal Hygiene     Chlorhexidine Bath      Oral Care  Brushed Teeth  Teeth/Dentures     Shave     Nutrition Percentage Eaten  Dinner, Between 50-75% Consumed  Environmental Precautions     Patient Turns/Positioning  Patient Turns Self from Side to Side  Patient Turns Assistance/Tolerance  Tolerates Well  Bed Positions     Head of Bed Elevated         Psychosocial/Neurologic Assessment  Psychosocial Assessment  Psychosocial (WDL):  Within Defined Limits  Patient Behaviors: Fatigue  Family Behaviors: Family Present  Neurologic Assessment  Neuro (WDL): Exceptions to WDL  Level of Consciousness: Alert  Orientation Level: Oriented X4  Cognition: Follows commands, Appropriate attention/concentration  Speech: Clear  Motor Function/Sensation Assessment: Sensation, Motor strength  RUE Sensation: Numbness  Muscle Strength Right Arm: Fair Strength against Gravity but No Resistance  LUE Sensation: Full sensation  Muscle Strength Left Arm: Normal Strength Against Gravity and Full Resistance  RLE Sensation: Numbness (pt stated that her sensation to lower extremities is coming back to normal)  Muscle Strength Right Leg: Fair Strength against Gravity but No Resistance  LLE Sensation: Full sensation  Muscle Strength Left Leg: Normal Strength Against Gravity and Full Resistance  EENT (WDL):  Within Defined Limits    Cardio/Pulmonary Assessment  Edema   RLE Edema: Trace  LLE Edema: Trace  Respiratory Breath Sounds  RUL Breath Sounds: Clear  RML Breath Sounds: Clear  RLL Breath Sounds: Diminished  OZIEL Breath Sounds: Clear  LLL Breath Sounds: Diminished  Cardiac Assessment   Cardiac  (WDL):  WDL Except

## 2024-06-02 NOTE — THERAPY
"Speech Language Pathology  Daily Treatment     Patient Name: Latanya Ferrera  Age:  58 y.o., Sex:  female  Medical Record #: [id number]  Today's Date: 6/2/2024     Precautions  Precautions: Fall Risk  Comments: R hemiplegia, R loss of sensation in hand, ICH    Subjective    Transferred pt in and out of bed for ST. Pt seen in w/c in ST office. Pt emotionally labial throughout session re: impairments/deficits, stroke, loss of her niece, losing her independence, and missing taking care of her grandson. Comfort and reassurance provided. Pt repetitive throughout session that she wants to \"do things independently\" and was resistant to assistance (I.e., assistance with opening and closing medication bottles, assistance with transfers and moving her otero bag/foot rests for her during transfers). Pt did not recall this therapist assisting her with transfers out of and back to bed- dicussed with nurse.     Objective       06/02/24 0901   Treatment Charges   SLP Cognitive Skill Development First 15 Minutes 1   SLP Cognitive Skill Development Additional 15 Minutes 3   SLP Total Time Spent   SLP Individual Total Time Spent (Mins) 60   Interdisciplinary Plan of Care Collaboration   IDT Collaboration with  Certified Nursing Assistant;Nursing   Patient Position at End of Therapy In Bed;Bed Alarm On;Call Light within Reach;Tray Table within Reach;Phone within Reach   Collaboration Comments CLOF         Assessment    SLP acted as scribe to complete memory log for this date. Pt able to recall breakfast and therapy activities indep.   Pt emotionally labial throughout the session and insistent on completing tasks indep. Pt resistive to assistance. Emotional support, encouragement, and comfort provided.   Pt completed functional medication sort with current medications. Pt sorted medications with 100% accuracy provided with significant time. Pt requested this therapist not to look at her during task due to wanting to do things \"on her own\". " Pt completed task with distant spv. Pt attempting to open pill bottles with her mouth and became increasingly frustrated when offered assistance and options for modifications. Opened remaining pill bottles for pt while she was sorting other medication.   Pt transferred back to bed at the end of the session and provided assistance getting set up with call light, cell phone, and TV.      Plan    Continue to reinforce memory log, complete med sort to ensure 100% accuracy and understanding    Passport items to be completed:  Express basic needs, understand food/liquid recommendations, consistently follow swallow precautions, manage finances, manage medications, arrive to therapy appointments on time, complete daily memory log entries, solve problems related to safety situations, review education related to hospitalization, complete caregiver training     Speech Therapy Problems (Active)       Problem: Memory STGs       Dates: Start:  05/30/24         Goal: STG-Within one week, patient will demonstrate use of memory strategies in order to recall information from therapies after a 2/hr delay.        Dates: Start:  05/30/24               Problem: Problem Solving STGs       Dates: Start:  05/30/24         Goal: STG-Within one week, patient will complete medication management tasks with 80% accuracy, given min verbal cues.        Dates: Start:  05/30/24            Goal: STG-Within one week, patient will complete alternating attention tasks with 80% accuracy given SPV       Dates: Start:  05/30/24               Problem: Speech/Swallowing LTGs       Dates: Start:  05/30/24         Goal: LTG-By discharge, patient will solve complex problems related to IADL's in order to d/c home with mod I        Dates: Start:  05/30/24

## 2024-06-02 NOTE — PROGRESS NOTES
NURSING DAILY NOTE    Name: Latanya Ferrera   Date of Admission: 5/29/2024   Admitting Diagnosis: Hemorrhagic stroke (HCC)  Attending Physician: Huy Huang M.d.  Allergies: Patient has no known allergies.    Safety  Patient Assist     Patient Precautions  Fall Risk  Precaution Comments  R hemiplegia, R loss of sensation in hand, ICH  Bed Transfer Status  Minimal Assist  Toilet Transfer Status   Moderate Assist  Assistive Devices  Rails, Wheelchair  Oxygen  None - Room Air  Diet/Therapeutic Dining  Current Diet Order   Procedures    Diet Order Diet: Regular     Pill Administration  whole  Agitated Behavioral Scale     ABS Level of Severity       Fall Risk  Has the patient had a fall this admission?   No  Carly Fisher Fall Risk Scoring  16, HIGH RISK  Fall Risk Safety Measures  bed alarm, chair alarm, poor balance, and low vision/ hearing    Vitals  Temperature: 36.5 °C (97.7 °F)  Temp src: Oral  Pulse: 80  Respiration: 14  Blood Pressure: 132/69  Blood Pressure MAP (Calculated): 90 MM HG  BP Location: Right, Upper Arm  Patient BP Position: Supine     Oxygen  Pulse Oximetry: 98 %  O2 (LPM): 0  O2 Delivery Device: None - Room Air    Bowel and Bladder  Last Bowel Movement  06/01/24  Stool Type  Type 2: Sausage shaped, but lumpy  Bowel Device     Continent  Bladder: Did not void   Bowel: No movement  Bladder Function  Urine Void (mL): 750 ml  Urinary Options: Yes  Urine Color: Yellow  Genitourinary Assessment   Bladder Assessment (WDL):  WDL Except  Ramirez Catheter: Present with Active Order  Ramirez Reasons per MD Order: Acute urinary retention or bladder outlet obstruction  Armirez Care: Given with Soap and Water  Urinary Elimination: Catheter (Document on LDA)  Urine Color: Yellow  Bladder Device: Indwelling Catheter  Bladder Medications: Yes    Skin  Xavier Score   17  Sensory Interventions   Bed Types: Standard/Trauma Mattress  Skin Preventative Measures: Waffle  Overlay, Pillows in Use for Support / Positioning  Moisture Interventions  Moisturizers/Barriers: Barrier Wipes  Containment Devices: Indwelling Catheter      Pain  Pain Rating Scale  0 - No Pain  Pain Location  Arm, Leg  Pain Location Orientation  Right  Pain Interventions   Declines    ADLs    Bathing   Family / Significant Other  Linen Change   Partial  Personal Hygiene     Chlorhexidine Bath      Oral Care  Brushed Teeth  Teeth/Dentures     Shave     Nutrition Percentage Eaten  Dinner, Between 50-75% Consumed  Environmental Precautions     Patient Turns/Positioning  Patient Turns Self from Side to Side  Patient Turns Assistance/Tolerance  Tolerates Well  Bed Positions     Head of Bed Elevated         Psychosocial/Neurologic Assessment  Psychosocial Assessment  Psychosocial (WDL):  Within Defined Limits  Patient Behaviors: Fatigue  Family Behaviors: Family Present  Neurologic Assessment  Neuro (WDL): Exceptions to WDL  Level of Consciousness: Alert  Orientation Level: Oriented X4  Cognition: Follows commands, Appropriate attention/concentration  Speech: Clear  Motor Function/Sensation Assessment: Sensation, Motor strength  RUE Sensation: Numbness  Muscle Strength Right Arm: Fair Strength against Gravity but No Resistance  LUE Sensation: Full sensation  Muscle Strength Left Arm: Normal Strength Against Gravity and Full Resistance  RLE Sensation: Numbness  Muscle Strength Right Leg: Fair Strength against Gravity but No Resistance  LLE Sensation: Full sensation  Muscle Strength Left Leg: Normal Strength Against Gravity and Full Resistance  EENT (WDL):  Within Defined Limits    Cardio/Pulmonary Assessment  Edema   RLE Edema: Trace  LLE Edema: Trace  Respiratory Breath Sounds  RUL Breath Sounds: Clear  RML Breath Sounds: Clear  RLL Breath Sounds: Diminished  OZIEL Breath Sounds: Clear  LLL Breath Sounds: Diminished  Cardiac Assessment   Cardiac (WDL):  WDL Except

## 2024-06-02 NOTE — PROGRESS NOTES
NURSING DAILY NOTE    Name: Latanya Ferrera   Date of Admission: 5/29/2024   Admitting Diagnosis: Hemorrhagic stroke (HCC)  Attending Physician: Huy Huang M.d.  Allergies: Patient has no known allergies.    Safety  Patient Assist     Patient Precautions  Fall Risk  Precaution Comments  R hemiplegia, R loss of sensation in hand, ICH  Bed Transfer Status  Minimal Assist  Toilet Transfer Status   Moderate Assist  Assistive Devices  Rails, Wheelchair  Oxygen  None - Room Air  Diet/Therapeutic Dining  Current Diet Order   Procedures    Diet Order Diet: Regular     Pill Administration  whole  Agitated Behavioral Scale     ABS Level of Severity       Fall Risk  Has the patient had a fall this admission?   No  Carly Fisher Fall Risk Scoring  16, HIGH RISK  Fall Risk Safety Measures  bed alarm, chair alarm, poor balance, and low vision/ hearing    Vitals  Temperature: 36.5 °C (97.7 °F)  Temp src: Oral  Pulse: 77  Respiration: 12  Blood Pressure: 132/69  Blood Pressure MAP (Calculated): 90 MM HG  BP Location: Right, Upper Arm  Patient BP Position: Supine     Oxygen  Pulse Oximetry: 100 %  O2 (LPM): 0  O2 Delivery Device: None - Room Air    Bowel and Bladder  Last Bowel Movement  06/01/24  Stool Type  Type 2: Sausage shaped, but lumpy  Bowel Device     Continent  Bladder: Did not void   Bowel: No movement  Bladder Function  Urine Void (mL): 750 ml  Urinary Options: Yes  Urine Color: Yellow  Genitourinary Assessment   Bladder Assessment (WDL):  WDL Except  Ramirez Catheter: Present with Active Order  Ramirez Reasons per MD Order: Acute urinary retention or bladder outlet obstruction  Ramirez Care: Given with Soap and Water  Urinary Elimination: Catheter (Document on LDA)  Urine Color: Yellow  Bladder Device: Indwelling Catheter  Bladder Medications: Yes    Skin  Xavier Score   17  Sensory Interventions   Bed Types: Standard/Trauma Mattress  Skin Preventative Measures:  Waffle Overlay, Pillows in Use for Support / Positioning  Moisture Interventions  Moisturizers/Barriers: Barrier Wipes  Containment Devices: Indwelling Catheter      Pain  Pain Rating Scale  4 - Distracts me, can do usual activities  Pain Location  Leg  Pain Location Orientation  Right  Pain Interventions   Medication (see MAR), Repositioned, Rest    ADLs    Bathing   Family / Significant Other  Linen Change   Partial  Personal Hygiene     Chlorhexidine Bath      Oral Care  Brushed Teeth  Teeth/Dentures     Shave     Nutrition Percentage Eaten  Dinner, Between 50-75% Consumed  Environmental Precautions     Patient Turns/Positioning  Patient Turns Self from Side to Side  Patient Turns Assistance/Tolerance  Tolerates Well  Bed Positions     Head of Bed Elevated         Psychosocial/Neurologic Assessment  Psychosocial Assessment  Psychosocial (WDL):  WDL Except  Patient Behaviors: Crying, Fatigue  Family Behaviors: Family Present  Neurologic Assessment  Neuro (WDL): Exceptions to WDL  Level of Consciousness: Alert  Orientation Level: Oriented X4  Cognition: Follows commands, Appropriate attention/concentration  Speech: Clear  Motor Function/Sensation Assessment: Sensation, Motor strength  RUE Sensation: Numbness  Muscle Strength Right Arm: Fair Strength against Gravity but No Resistance  LUE Sensation: Full sensation  Muscle Strength Left Arm: Normal Strength Against Gravity and Full Resistance  RLE Sensation: Numbness (pt stated that her sensation to lower extremities is coming back to normal)  Muscle Strength Right Leg: Fair Strength against Gravity but No Resistance  LLE Sensation: Full sensation  Muscle Strength Left Leg: Normal Strength Against Gravity and Full Resistance  EENT (WDL):  Within Defined Limits    Cardio/Pulmonary Assessment  Edema   RLE Edema: Trace  LLE Edema: Trace  Respiratory Breath Sounds  RUL Breath Sounds: Clear  RML Breath Sounds: Clear  RLL Breath Sounds: Diminished  OZIEL Breath Sounds:  Clear  LLL Breath Sounds: Diminished  Cardiac Assessment   Cardiac (WDL):  WDL Except

## 2024-06-03 ENCOUNTER — APPOINTMENT (OUTPATIENT)
Dept: SPEECH THERAPY | Facility: REHABILITATION | Age: 59
DRG: 057 | End: 2024-06-03
Attending: PHYSICAL MEDICINE & REHABILITATION
Payer: MEDICAID

## 2024-06-03 ENCOUNTER — APPOINTMENT (OUTPATIENT)
Dept: PHYSICAL THERAPY | Facility: REHABILITATION | Age: 59
DRG: 057 | End: 2024-06-03
Attending: PHYSICAL MEDICINE & REHABILITATION
Payer: MEDICAID

## 2024-06-03 ENCOUNTER — APPOINTMENT (OUTPATIENT)
Dept: OCCUPATIONAL THERAPY | Facility: REHABILITATION | Age: 59
DRG: 057 | End: 2024-06-03
Attending: PHYSICAL MEDICINE & REHABILITATION
Payer: MEDICAID

## 2024-06-03 LAB
ALBUMIN SERPL BCP-MCNC: 3.5 G/DL (ref 3.2–4.9)
ALBUMIN/GLOB SERPL: 1.2 G/DL
ALP SERPL-CCNC: 90 U/L (ref 30–99)
ALT SERPL-CCNC: 23 U/L (ref 2–50)
ANION GAP SERPL CALC-SCNC: 12 MMOL/L (ref 7–16)
AST SERPL-CCNC: 14 U/L (ref 12–45)
BASOPHILS # BLD AUTO: 0.5 % (ref 0–1.8)
BASOPHILS # BLD: 0.05 K/UL (ref 0–0.12)
BILIRUB SERPL-MCNC: 0.3 MG/DL (ref 0.1–1.5)
BUN SERPL-MCNC: 19 MG/DL (ref 8–22)
CALCIUM ALBUM COR SERPL-MCNC: 9.6 MG/DL (ref 8.5–10.5)
CALCIUM SERPL-MCNC: 9.2 MG/DL (ref 8.5–10.5)
CHLORIDE SERPL-SCNC: 109 MMOL/L (ref 96–112)
CO2 SERPL-SCNC: 19 MMOL/L (ref 20–33)
CREAT SERPL-MCNC: 0.59 MG/DL (ref 0.5–1.4)
EOSINOPHIL # BLD AUTO: 0.29 K/UL (ref 0–0.51)
EOSINOPHIL NFR BLD: 2.7 % (ref 0–6.9)
ERYTHROCYTE [DISTWIDTH] IN BLOOD BY AUTOMATED COUNT: 44.4 FL (ref 35.9–50)
GFR SERPLBLD CREATININE-BSD FMLA CKD-EPI: 105 ML/MIN/1.73 M 2
GLOBULIN SER CALC-MCNC: 3 G/DL (ref 1.9–3.5)
GLUCOSE SERPL-MCNC: 110 MG/DL (ref 65–99)
HCT VFR BLD AUTO: 35.4 % (ref 37–47)
HGB BLD-MCNC: 10.9 G/DL (ref 12–16)
IMM GRANULOCYTES # BLD AUTO: 0.08 K/UL (ref 0–0.11)
IMM GRANULOCYTES NFR BLD AUTO: 0.7 % (ref 0–0.9)
LYMPHOCYTES # BLD AUTO: 1.94 K/UL (ref 1–4.8)
LYMPHOCYTES NFR BLD: 18 % (ref 22–41)
MCH RBC QN AUTO: 29.1 PG (ref 27–33)
MCHC RBC AUTO-ENTMCNC: 30.8 G/DL (ref 32.2–35.5)
MCV RBC AUTO: 94.4 FL (ref 81.4–97.8)
MONOCYTES # BLD AUTO: 0.76 K/UL (ref 0–0.85)
MONOCYTES NFR BLD AUTO: 7 % (ref 0–13.4)
NEUTROPHILS # BLD AUTO: 7.67 K/UL (ref 1.82–7.42)
NEUTROPHILS NFR BLD: 71.1 % (ref 44–72)
NRBC # BLD AUTO: 0 K/UL
NRBC BLD-RTO: 0 /100 WBC (ref 0–0.2)
PHOSPHATE SERPL-MCNC: 4.5 MG/DL (ref 2.5–4.5)
PLATELET # BLD AUTO: 279 K/UL (ref 164–446)
PMV BLD AUTO: 10.6 FL (ref 9–12.9)
POTASSIUM SERPL-SCNC: 4.2 MMOL/L (ref 3.6–5.5)
PROT SERPL-MCNC: 6.5 G/DL (ref 6–8.2)
RBC # BLD AUTO: 3.75 M/UL (ref 4.2–5.4)
SODIUM SERPL-SCNC: 140 MMOL/L (ref 135–145)
WBC # BLD AUTO: 10.8 K/UL (ref 4.8–10.8)

## 2024-06-03 PROCEDURE — 700111 HCHG RX REV CODE 636 W/ 250 OVERRIDE (IP): Mod: JZ | Performed by: PHYSICAL MEDICINE & REHABILITATION

## 2024-06-03 PROCEDURE — 80053 COMPREHEN METABOLIC PANEL: CPT

## 2024-06-03 PROCEDURE — 700102 HCHG RX REV CODE 250 W/ 637 OVERRIDE(OP): Performed by: PHYSICAL MEDICINE & REHABILITATION

## 2024-06-03 PROCEDURE — 97110 THERAPEUTIC EXERCISES: CPT

## 2024-06-03 PROCEDURE — 770010 HCHG ROOM/CARE - REHAB SEMI PRIVAT*

## 2024-06-03 PROCEDURE — 99233 SBSQ HOSP IP/OBS HIGH 50: CPT | Performed by: PHYSICAL MEDICINE & REHABILITATION

## 2024-06-03 PROCEDURE — A9270 NON-COVERED ITEM OR SERVICE: HCPCS | Performed by: PHYSICAL MEDICINE & REHABILITATION

## 2024-06-03 PROCEDURE — 85025 COMPLETE CBC W/AUTO DIFF WBC: CPT

## 2024-06-03 PROCEDURE — 36415 COLL VENOUS BLD VENIPUNCTURE: CPT

## 2024-06-03 PROCEDURE — 84100 ASSAY OF PHOSPHORUS: CPT

## 2024-06-03 PROCEDURE — 97530 THERAPEUTIC ACTIVITIES: CPT | Mod: CQ

## 2024-06-03 PROCEDURE — 97116 GAIT TRAINING THERAPY: CPT | Mod: CQ

## 2024-06-03 PROCEDURE — 97535 SELF CARE MNGMENT TRAINING: CPT

## 2024-06-03 PROCEDURE — 97130 THER IVNTJ EA ADDL 15 MIN: CPT

## 2024-06-03 PROCEDURE — 97129 THER IVNTJ 1ST 15 MIN: CPT

## 2024-06-03 RX ORDER — GABAPENTIN 400 MG/1
400 CAPSULE ORAL 3 TIMES DAILY
Status: DISCONTINUED | OUTPATIENT
Start: 2024-06-03 | End: 2024-06-13

## 2024-06-03 RX ORDER — BENZONATATE 100 MG/1
100 CAPSULE ORAL 3 TIMES DAILY
Status: DISCONTINUED | OUTPATIENT
Start: 2024-06-03 | End: 2024-06-20 | Stop reason: HOSPADM

## 2024-06-03 RX ORDER — BENZONATATE 100 MG/1
100 CAPSULE ORAL 2 TIMES DAILY
Status: DISCONTINUED | OUTPATIENT
Start: 2024-06-03 | End: 2024-06-03

## 2024-06-03 RX ADMIN — BENZONATATE 100 MG: 100 CAPSULE ORAL at 14:46

## 2024-06-03 RX ADMIN — ACETAMINOPHEN 500 MG: 500 TABLET, FILM COATED ORAL at 20:55

## 2024-06-03 RX ADMIN — ENOXAPARIN SODIUM 40 MG: 100 INJECTION SUBCUTANEOUS at 17:30

## 2024-06-03 RX ADMIN — GABAPENTIN 400 MG: 400 CAPSULE ORAL at 14:46

## 2024-06-03 RX ADMIN — SENNOSIDES AND DOCUSATE SODIUM 2 TABLET: 50; 8.6 TABLET ORAL at 20:55

## 2024-06-03 RX ADMIN — HYDRALAZINE HYDROCHLORIDE 50 MG: 50 TABLET ORAL at 08:08

## 2024-06-03 RX ADMIN — LISINOPRIL 40 MG: 20 TABLET ORAL at 05:57

## 2024-06-03 RX ADMIN — DOCUSATE SODIUM 200 MG: 100 CAPSULE, LIQUID FILLED ORAL at 08:07

## 2024-06-03 RX ADMIN — HYDRALAZINE HYDROCHLORIDE 50 MG: 50 TABLET ORAL at 12:47

## 2024-06-03 RX ADMIN — AMLODIPINE BESYLATE 10 MG: 5 TABLET ORAL at 05:57

## 2024-06-03 RX ADMIN — ACETAMINOPHEN 500 MG: 500 TABLET, FILM COATED ORAL at 14:48

## 2024-06-03 RX ADMIN — Medication 5000 UNITS: at 08:07

## 2024-06-03 RX ADMIN — GABAPENTIN 300 MG: 300 CAPSULE ORAL at 08:07

## 2024-06-03 RX ADMIN — HYDRALAZINE HYDROCHLORIDE 50 MG: 50 TABLET ORAL at 20:54

## 2024-06-03 RX ADMIN — BENZONATATE 100 MG: 100 CAPSULE ORAL at 11:14

## 2024-06-03 RX ADMIN — ACETAMINOPHEN 500 MG: 500 TABLET, FILM COATED ORAL at 05:58

## 2024-06-03 RX ADMIN — DOCUSATE SODIUM 200 MG: 100 CAPSULE, LIQUID FILLED ORAL at 20:55

## 2024-06-03 RX ADMIN — TAMSULOSIN HYDROCHLORIDE 0.4 MG: 0.4 CAPSULE ORAL at 17:30

## 2024-06-03 RX ADMIN — GABAPENTIN 400 MG: 400 CAPSULE ORAL at 20:54

## 2024-06-03 RX ADMIN — OMEPRAZOLE 20 MG: 20 CAPSULE, DELAYED RELEASE ORAL at 08:07

## 2024-06-03 RX ADMIN — BENZONATATE 100 MG: 100 CAPSULE ORAL at 20:54

## 2024-06-03 RX ADMIN — HYDRALAZINE HYDROCHLORIDE 50 MG: 50 TABLET ORAL at 17:29

## 2024-06-03 ASSESSMENT — GAIT ASSESSMENTS
ASSISTIVE DEVICE: HEMI-WALKER;OTHER (COMMENTS)
GAIT LEVEL OF ASSIST: TOTAL ASSIST X 2
DISTANCE (FEET): 30

## 2024-06-03 ASSESSMENT — ACTIVITIES OF DAILY LIVING (ADL)
TOILETING_LEVEL_OF_ASSIST_DESCRIPTION: GRAB BAR;SET-UP OF EQUIPMENT;SUPERVISION FOR SAFETY;VERBAL CUEING
TOILET_TRANSFER_DESCRIPTION: ADAPTIVE EQUIPMENT;GRAB BAR;SET-UP OF EQUIPMENT;SUPERVISION FOR SAFETY;VERBAL CUEING
BED_CHAIR_WHEELCHAIR_TRANSFER_DESCRIPTION: INCREASED TIME;SET-UP OF EQUIPMENT;SUPERVISION FOR SAFETY;VERBAL CUEING;ADAPTIVE EQUIPMENT
TUB_SHOWER_TRANSFER_DESCRIPTION: SHOWER BENCH;SET-UP OF EQUIPMENT;SUPERVISION FOR SAFETY;VERBAL CUEING
BED_CHAIR_WHEELCHAIR_TRANSFER_DESCRIPTION: SET-UP OF EQUIPMENT;SUPERVISION FOR SAFETY;VERBAL CUEING

## 2024-06-03 ASSESSMENT — PAIN DESCRIPTION - PAIN TYPE
TYPE: ACUTE PAIN
TYPE: ACUTE PAIN

## 2024-06-03 NOTE — THERAPY
Speech Language Pathology  Daily Treatment     Patient Name: Latanya Ferrera  Age:  58 y.o., Sex:  female  Medical Record #: [id number]  Today's Date: 6/3/2024     Precautions  Precautions: Fall Risk  Comments: R hemiplegia, R loss of sensation in hand, ICH    Subjective    Pt continues to be tearful at times when discussing CLOF and physical deficits at this time. Benefits from encouragement.      Objective       06/03/24 1004   Treatment Charges   SLP Cognitive Skill Development First 15 Minutes 1   SLP Cognitive Skill Development Additional 15 Minutes 3   SLP Total Time Spent   SLP Individual Total Time Spent (Mins) 60   Cognition   Functional Math / Financial Management Supervision (5)   Interdisciplinary Plan of Care Collaboration   IDT Collaboration with  Certified Nursing Assistant   Patient Position at End of Therapy In Bed;Call Light within Reach;Tray Table within Reach   Collaboration Comments transfer of care to CNA for vitals         Assessment    Fxl med sort x2 trials. Pt using larger 4x daily pill organizer for ease of sorting mock pills into compartments. 1st trial - 3 errors, 2nd trial - 100% with SPV, pt initially making sorting errors however was able to catch and self correct immediately. Encouraged pt to have initial SPV and assistance with opening pill bottles at home.     Strengths: Motivated for self care and independence, Pleasant and cooperative, Willingly participates in therapeutic activities, Supportive family, Making steady progress towards goals, Alert and oriented, Effective communication skills  Barriers: Hemiparesis, Emotional lability, Impaired functional cognition    Plan    Continue memory strategies, working memory, $ mngt.     Passport items to be completed:  Express basic needs, understand food/liquid recommendations, consistently follow swallow precautions, manage finances, manage medications, arrive to therapy appointments on time, complete daily memory log entries, solve  problems related to safety situations, review education related to hospitalization, complete caregiver training     Speech Therapy Problems (Active)       Problem: Memory STGs       Dates: Start:  05/30/24         Goal: STG-Within one week, patient will demonstrate use of memory strategies in order to recall information from therapies after a 2/hr delay.        Dates: Start:  05/30/24         Goal Note filed on 06/03/24 1300 by Mya Steven MS,CCC-SLP       Will continue to target.                  Problem: Problem Solving STGs       Dates: Start:  05/30/24         Goal: STG-Within one week, patient will complete alternating attention tasks with 80% accuracy given SPV       Dates: Start:  05/30/24         Goal Note filed on 06/03/24 1300 by Mya Steven MS,CCC-SLP       Will continue to target.                  Problem: Speech/Swallowing LTGs       Dates: Start:  05/30/24         Goal: LTG-By discharge, patient will solve complex problems related to IADL's in order to d/c home with mod I        Dates: Start:  05/30/24

## 2024-06-03 NOTE — DISCHARGE PLANNING
CM met with patient to update on IDT and DC date of 6/15/24.  Answered questions.  Patient asked about short term disability.  Patient recently started a new job at [employer].  She was unsure if she got a disability benefit through her job.  She asked CM to talk to her daughter about it.  CM attempted to go over [employer] application with patient.  Patient deferred CM to her daughter.      KAVIN called patients daughter Alcira to update on above.  Alcira will call patients supervisor to see if patient has a disability benefit.  Patient works part time and this job is her only source of income.  Alcira plans to stay with patient after DC.  KAVIN discussed home health and a  to follow up with patient should patient not have any disability benefit through her job and to help patient apply for Social Security/Disability at the Federal level.  Alcira thanked KAVIN for the update.  KAVIN will continue to monitor for DC needs.

## 2024-06-03 NOTE — PROGRESS NOTES
"  Physical Medicine & Rehabilitation Progress Note    Encounter Date: 6/3/2024    Chief Complaint: Depressed about not taking her BP medications     Interval Events (Subjective):  LAURA  IDFC  BM 6/1    Seen and examined in her room. She is doing better. She still gets depressed about not taking her blood pressure medications and having a stroke. She would like to take Tessalon perls three times daily. Her arm pain is neuropathic in nature and is ok with Gabapentin, would be interested in increasing if able. Otherwise denies and new or concerning symptoms.       ROS: 14 point ROS negative unless otherwise specified in the HPI    Objective:  VITAL SIGNS: /70   Pulse 80   Temp 36.9 °C (98.4 °F) (Oral)   Resp 18   Ht 1.676 m (5' 6\")   Wt 82.3 kg (181 lb 8 oz)   SpO2 97%   BMI 29.29 kg/m²     GEN: No apparent distress  HEENT: Head normocephalic, atraumatic.  Sclera nonicteric bilaterally, no ocular discharge appreciated bilaterally.  PULMONARY: Breathing nonlabored on room air, no respiratory accessory muscle use.  Not requiring supplemental oxygen. Audible dry cough.  ABD: Soft, nontender.  SKIN: No appreciable skin breakdown on exposed areas of skin.  PSYCH: Mood and affect within normal limits.  NEURO: Awake alert.  Conversational.  Logical thought content. Right hemiparesis. Dysarthria improving.      Laboratory Values:  Recent Results (from the past 72 hour(s))   CBC WITH DIFFERENTIAL    Collection Time: 06/03/24  5:40 AM   Result Value Ref Range    WBC 10.8 4.8 - 10.8 K/uL    RBC 3.75 (L) 4.20 - 5.40 M/uL    Hemoglobin 10.9 (L) 12.0 - 16.0 g/dL    Hematocrit 35.4 (L) 37.0 - 47.0 %    MCV 94.4 81.4 - 97.8 fL    MCH 29.1 27.0 - 33.0 pg    MCHC 30.8 (L) 32.2 - 35.5 g/dL    RDW 44.4 35.9 - 50.0 fL    Platelet Count 279 164 - 446 K/uL    MPV 10.6 9.0 - 12.9 fL    Neutrophils-Polys 71.10 44.00 - 72.00 %    Lymphocytes 18.00 (L) 22.00 - 41.00 %    Monocytes 7.00 0.00 - 13.40 %    Eosinophils 2.70 0.00 - 6.90 % "    Basophils 0.50 0.00 - 1.80 %    Immature Granulocytes 0.70 0.00 - 0.90 %    Nucleated RBC 0.00 0.00 - 0.20 /100 WBC    Neutrophils (Absolute) 7.67 (H) 1.82 - 7.42 K/uL    Lymphs (Absolute) 1.94 1.00 - 4.80 K/uL    Monos (Absolute) 0.76 0.00 - 0.85 K/uL    Eos (Absolute) 0.29 0.00 - 0.51 K/uL    Baso (Absolute) 0.05 0.00 - 0.12 K/uL    Immature Granulocytes (abs) 0.08 0.00 - 0.11 K/uL    NRBC (Absolute) 0.00 K/uL   Comp Metabolic Panel    Collection Time: 06/03/24  5:40 AM   Result Value Ref Range    Sodium 140 135 - 145 mmol/L    Potassium 4.2 3.6 - 5.5 mmol/L    Chloride 109 96 - 112 mmol/L    Co2 19 (L) 20 - 33 mmol/L    Anion Gap 12.0 7.0 - 16.0    Glucose 110 (H) 65 - 99 mg/dL    Bun 19 8 - 22 mg/dL    Creatinine 0.59 0.50 - 1.40 mg/dL    Calcium 9.2 8.5 - 10.5 mg/dL    Correct Calcium 9.6 8.5 - 10.5 mg/dL    AST(SGOT) 14 12 - 45 U/L    ALT(SGPT) 23 2 - 50 U/L    Alkaline Phosphatase 90 30 - 99 U/L    Total Bilirubin 0.3 0.1 - 1.5 mg/dL    Albumin 3.5 3.2 - 4.9 g/dL    Total Protein 6.5 6.0 - 8.2 g/dL    Globulin 3.0 1.9 - 3.5 g/dL    A-G Ratio 1.2 g/dL   PHOSPHORUS    Collection Time: 06/03/24  5:40 AM   Result Value Ref Range    Phosphorus 4.5 2.5 - 4.5 mg/dL   ESTIMATED GFR    Collection Time: 06/03/24  5:40 AM   Result Value Ref Range    GFR (CKD-EPI) 105 >60 mL/min/1.73 m 2       Medications:  Scheduled Medications   Medication Dose Frequency    gabapentin  300 mg TID    docusate sodium  200 mg BID    vitamin D3  5,000 Units DAILY    enoxaparin (LOVENOX) injection  40 mg DAILY AT 1800    Pharmacy Consult Request  1 Each PHARMACY TO DOSE    omeprazole  20 mg DAILY    senna-docusate  2 Tablet Q EVENING    amLODIPine  10 mg Q DAY    hydrALAZINE  50 mg 4X/DAY    lisinopril  40 mg Q DAY    tamsulosin  0.4 mg AFTER DINNER     PRN medications: Respiratory Therapy Consult, hydrALAZINE, senna-docusate **AND** polyethylene glycol/lytes, bisacodyl, magnesium hydroxide, acetaminophen, carboxymethylcellulose,  benzocaine-menthol, mag hydrox-al hydrox-simeth, ondansetron **OR** ondansetron, traZODone, sodium chloride, ipratropium-albuterol, melatonin    Diet:  Current Diet Order   Procedures    Diet Order Diet: Regular       Medical Decision Making and Plan:  Left Basal Ganglia ICH secondary to hypertensive emergency  Right Hemiparesis  PT and OT for mobility and ADLs. Per guidelines, 15 hours per week between PT, OT and/or SLP.  Follow-up Neurology (Dr. Babb)  Secondary stroke ppx: Anti-hypertensives     Hypertension  Continue Hydralazine 50mg q6 hours  Continue Lisinopril 40mg daily   Continue Amlodipine 10mg daily   6/3 BP stable     Leukocytosis  Improving on admission but unclear cause  Continue Rocephin x 2 doses (through ) - ok to d/c IV after  6/3 high normal in 10's. Historically has had WBC count fluctuating between 10-11     Urinary Retention  Has otero - required CIC   Continue Flomax 0.4mg nightly    Per patient preference keep otero another 1-2 days. D/w her r/o CAUTI. Patient and dtr aware.   6/3 Discontinue IDFC    Chronic Cough  Secondary to years of smoking. D/w patient and daughter, nothing has changed in terms of her cough, nothing new or concerning to them.   Tessalon perls scheduled TID    Hyperphosphatemia  6/3 Resolved, Ph normal     Anemia  6/3 Stable at 10.9     Pain  Tylenol PRN    Neuropathic Pain  6/3 Continue Gabapentin - increase to 400mg TID     GI Ppx  Patient on Prilosec for GERD prophylaxis.      Bowel   Patient on Senna-docusate for constipation prophylaxis.         Upcoming Labs/imagin/6     DVT PROPHYLAXIS: None - Hemorrhagic stroke. Check US - negative. Per Neuro note, clear for DVT chemo-ppx once MRI completed (completed ). Lovenox 40 mg SQ scheduled to start 6/3.      HOSPITALIST FOLLOWING: None     CODE STATUS: FULL CODE     DISPO: Home with minimal support     VIANCA: 6/15/24     MEDS SENT TO: Not [facility]     DISCHARGE SPECIALIST FOLLOW UP: Neurology     Patient to  scheduled follow up with their PCP within 2 weeks from discharge from the [facility].   ____________________________________    Dr. Sofi Jules DO, MS  [facility] - Physical Medicine & Rehabilitation   ____________________________________    _____________________________________  Interdisciplinary Team Conference   Most recent IDT on 6/3/2024    I, Dr. Sofi Jules DO, MS, was present and led the interdisciplinary team conference on 6/3/2024.  I led the IDT conference and agree with the IDT conference documentation and plan of care as noted below.     Nursing:  Diet Current Diet Order   Procedures    Diet Order Diet: Regular       Eating ADL Supervision  Set-up of equipment or meal/tube feeding   % of Last Meal  Oral Nutrition: Dinner, Between 50-75% Consumed   Sleep    Bowel Last BM: 06/03/24   Bladder    Removed IDFC   No void yet    Physical Therapy:  Bed Mobility    Transfers Contact Guard Assist  Set-up of equipment, Supervision for safety, Verbal cueing   Mobility Total Assist X 2 (modA w/ w/c follow for safety)   Stairs    Hallway ambulation with rail  Try hemiwalker for ambulation  Family training for discharge    Occupational Therapy:  Grooming Supervision, Seated   Bathing  (Patient deferred at this time, reporting she bathed with her daughter yesterday)   UB Dressing Supervision   LB Dressing Contact Guard Assist   Toileting Contact Guard Assist   Shower & Transfer Contact Guard Assist        Speech-Language Pathology:  Comprehension: Modified Independent  Comprehension Description: Glasses  Expression: Independent     Social Interaction: Independent   Problem Solving: Minimal Assist  Problem Solving Description: Verbal cueing, Increased time, Bed/chair alarm  Memory: Moderate Assist  Memory Description: Verbal cueing, Increased time, Bed/chair alarm, Therapy schedule    Labile in function. Requires encouragement and redirection. Did medications with 100% accuracy. Self  corrects.     Respiratory Therapy:  O2 (LPM): 0  O2 Delivery Device: None - Room Air    Case Management:  Continues to work on disposition and DME needs.     BARRIERS TO DISCHARGE HOME:  Family training  Equipment: Tub transfer bench, grab bars in bathroom  OP vs HH services   Medical Stability     Discharge Date/Disposition:  6/15/24  _____________________________________    Total time:  50 minutes. Time spent included pre-rounding review of vitals and tests, unit/floor time, face-to-face time with the patient including physical examination, care coordination, counseling of patient and/or family, ordering medications/procedures/tests, discussion with CM, PT, OT, SLP and/or other healthcare providers, and documentation in the electronic medical record.

## 2024-06-03 NOTE — THERAPY
Occupational Therapy  Daily Treatment     Patient Name: Latanya Ferrera  Age:  58 y.o., Sex:  female  Medical Record #: [id number]  Today's Date: 6/3/2024     Precautions  Precautions: (P) Fall Risk  Comments: R hemiplegia, R loss of sensation in hand, ICH         Subjective    Patient was resting in bed and ready for OT.  She was agreeable to adl reassessment and changing clothes.     Objective       06/03/24 0831   OT Charge Group   OT Self Care / ADL (Units) 3   OT Therapeutic Exercise (Units) 1   OT Total Time Spent   OT Individual Total Time Spent (Mins) 60   Precautions   Precautions Fall Risk   Pain 0 - 10 Group   Location Shoulder   Location Orientation Right   Therapist Pain Assessment Prior to Activity   Functional Level of Assist   Eating Supervision   Eating Description Set-up of equipment or meal/tube feeding   Grooming Supervision;Seated   Grooming Description Seated in wheelchair at sink;Supervision for safety;Verbal cueing   Upper Body Dressing Supervision   Upper Body Dressing Description Set-up of equipment;Supervision for safety;Verbal cueing  (to don/doff pullover shirts)   Lower Body Dressing Contact Guard Assist   Lower Body Dressing Description Grab bar;Set-up of equipment;Supervision for safety;Verbal cueing  (don/doff socks and pants, cues for threading R LE first when donning pants)   Toileting Contact Guard Assist   Toileting Description Grab bar;Set-up of equipment;Supervision for safety;Verbal cueing   Bed, Chair, Wheelchair Transfer Contact Guard Assist   Bed Chair Wheelchair Transfer Description Set-up of equipment;Supervision for safety;Verbal cueing   Toilet Transfers Contact Guard Assist   Toilet Transfer Description Adaptive equipment;Grab bar;Set-up of equipment;Supervision for safety;Verbal cueing  (commode over toilet)   Tub / Shower Transfers Contact Guard Assist   Tub Shower Transfer Description Shower bench;Set-up of equipment;Supervision for safety;Verbal cueing  (dry tub/shower  transfer using tub bench with cues)   Supine Upper Body Exercises   Supine Upper Body Exercises Yes   Front Arm Raise 1 set of 10;Right   Bicep Curl 1 set of 10;Right   Elbow Extension 1 set of 10;Right   Other Exercise 1 set of 10;Right; shoulder ab/adduction, forearm pronation/supination, wrist flexion/extension and finger flexion/extension   Bed Mobility    Supine to Sit Standby Assist   Scooting Standby Assist   Skilled Intervention Verbal Cuing   Interdisciplinary Plan of Care Collaboration   Patient Position at End of Therapy Seated;Chair Alarm On;Self Releasing Lap Belt Applied;Call Light within Reach;Tray Table within Reach;Phone within Reach     CGA squat pivot transfer w/c <> mat table.    Issued handout/picture of tub bench from [email or web address].  Informed patient about [employer] for DME.    Assessment    Patient demonstrated great improvement in ADLs today versus day of initial evaluation.  She continues to be motivated to improve independence and R UE movement.    Strengths: Able to follow instructions, Good insight into deficits/needs, Independent prior level of function, Manages pain appropriately, Motivated for self care and independence, Pleasant and cooperative, Supportive family, Willingly participates in therapeutic activities  Barriers: Decreased endurance, Hemiparesis, Home accessibility, Impaired activity tolerance, Impaired balance (Impaired coordination/motor control)    Plan    ADLs, safety with transfers, neuro re-ed R UE, standing tolerance/balance,  3x/week with therapy techs for adjunct therapy     DME  OT DME Recommendations  Bathroom Equipment: Tub Transfer Bench (Medicaid Only)  Additional Equipment: Other (Comments) (TBD)      Occupational Therapy Goals (Active)       Problem: Bathing       Dates: Start:  05/30/24         Goal: STG-Within one week, patient will bathe with MIN A and LRAD       Dates: Start:  05/30/24               Problem: Dressing       Dates: Start:  05/30/24          Goal: STG-Within one week, patient will dress LB with CGA and LRAD       Dates: Start:  05/30/24               Problem: Functional Cognition       Dates: Start:  05/30/24         Goal: STG-Within one week, patient will attend to R arm and incorporate into functional tasks with min cues       Dates: Start:  05/30/24               Problem: Functional Transfers       Dates: Start:  05/30/24         Goal: STG-Within one week, patient will transfer to toilet with CGA and LRAD       Dates: Start:  05/30/24            Goal: STG-Within one week, patient will transfer to tub/shower with CGA and LRAD       Dates: Start:  05/30/24               Problem: OT Long Term Goals       Dates: Start:  05/30/24         Goal: LTG-By discharge, patient will complete basic self care tasks with SPV-MOD I and LRAD       Dates: Start:  05/30/24            Goal: LTG-By discharge, patient will perform bathroom transfers with SPV-MOD I and LRAD       Dates: Start:  05/30/24

## 2024-06-03 NOTE — THERAPY
Physical Therapy   Daily Treatment     Patient Name: Latanya Ferrera  Age:  58 y.o., Sex:  female  Medical Record #: [id number]  Today's Date: 6/3/2024     Precautions  Precautions: Fall Risk  Comments: R hemiplegia, R loss of sensation in hand, ICH    Subjective    Pt was in bed upon arrival, agreeable to session.  Pt was getting emotional during ambulation and required seated RB. Pt's mom came visiting at the end of session and pt was tearing up again.     Objective       06/03/24 1401   PT Charge Group   PT Gait Training (Units) 2   PT Therapeutic Activities (Units) 2   Supervising Physical Therapist Christina Fisher   PT Total Time Spent   PT Individual Total Time Spent (Mins) 60   Pain 0 - 10 Group   Location Neck;Head   Location Orientation Posterior   Pain Rating Scale (NPRS) 8   Therapist Pain Assessment During Activity;Nurse Notified   Cognition    Level of Consciousness Alert   Safety Awareness Impaired   Gait Functional Level of Assist    Gait Level Of Assist Total Assist X 2  (maxA w/ w/c follow for safety)   Assistive Device Jaciel-Walker;Other (Comments)  (hallway rail, R black ant shell AFO size M)   Distance (Feet) 30   # of Times Distance was Traveled 2  (HRx1, HWx1)   Deviation Step To;Decreased Base Of Support;Decreased Toe Off;Decreased Heel Strike;Shuffled Gait;Ataxic   Transfer Functional Level of Assist   Bed, Chair, Wheelchair Transfer Contact Guard Assist   Bed Chair Wheelchair Transfer Description Increased time;Set-up of equipment;Supervision for safety;Verbal cueing;Adaptive equipment  (bed rail/ w/c reach)   Bed Mobility    Supine to Sit Standby Assist   Sit to Supine Standby Assist   Sit to Stand Minimal Assist  (HW)     Education provided regarding the AFO used, skin checked after doffing AFO, no ABN signs.   totalA donning/doffing RAFO and add shoe cushion and ace wrap for support d/t RLE edema, not able to fit it into her shoe    Assessment    Pt tolerated session well, she was able to utilize  "HW instead of hallway rail for gait training. But she required mod/maxA during RLE stance phase, Javed to advance RLE and w/c follow for safety. Pt is benefiting from R black anterior shell AFO to minimize RLE knee buckling. She stated it's feeling comfortable.       Strengths: Able to follow instructions, Alert and oriented, Independent prior level of function, Motivated for self care and independence, Pleasant and cooperative, Supportive family, Willingly participates in therapeutic activities  Barriers: Decreased endurance, Difficulty following instructions, Fatigue, Hemiplegia, Home accessibility, Hypertension, Impaired balance, Impaired carryover of learning, Limited mobility (lives alone, decreased knowledge of condition, limited daytime family support)    Plan    Ambulation w/ jaciel walker, black anterior shell AFO w/ shoe cushion and ace wrap if R shoe won't fit  NMRE RLE  RLE Strengthening and endurance  Mat program  Standing balance  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)     DME  PT DME Recommendations  Wheelchair: 18\" Width, Jaciel Height (16\"-17\"), Removable/Flip Back Armrests, Standard Leg Rests, Anti-tippers (may need specialty chair)  Cushion: Standard  Assistive Device: Other (Comments) (TBD pending progress: HW>quad cane>cane)  Additional Equipment: Other (Comments) (TBD)     Passport items to be completed:  Get in/out of bed safely, in/out of a vehicle, safely use mobility device, walk or wheel around home/community, navigate up and down stairs, show how to get up/down from the ground, ensure home is accessible, demonstrate HEP, complete caregiver training    Physical Therapy Problems (Active)       Problem: Balance       Dates: Start:  05/30/24         Goal: STG-Within one week, patient will maintain static standing c/ 1UE support at SBA or better, >/= 2 minutes.        Dates: Start:  05/30/24               Problem: Mobility       Dates: Start:  05/30/24         Goal: STG-Within " one week, patient will ambulate household distances >/= 50' c/ LRAD and TotalA or better.        Dates: Start:  05/30/24               Problem: Mobility Transfers       Dates: Start:  05/30/24         Goal: STG-Within one week, patient will transfer bed to chair c/ Sonia or better c/ appropriate compensatory method (reach pivot vs SPT).       Dates: Start:  05/30/24               Problem: PT-Long Term Goals       Dates: Start:  05/30/24         Goal: LTG-By discharge, patient will ambulate >/= 300' c/ LRAD and CGA or better.        Dates: Start:  05/30/24            Goal: LTG-By discharge, patient will transfer one surface to another c/ Boni.        Dates: Start:  05/30/24            Goal: LTG-By discharge, patient will ambulate up/down 2 stairs c/ CGA or better to access home.        Dates: Start:  05/30/24            Goal: LTG-By discharge, patient will transfer in/out of a car c/ CGA or better.        Dates: Start:  05/30/24

## 2024-06-04 ENCOUNTER — APPOINTMENT (OUTPATIENT)
Dept: PHYSICAL THERAPY | Facility: REHABILITATION | Age: 59
DRG: 057 | End: 2024-06-04
Attending: PHYSICAL MEDICINE & REHABILITATION
Payer: MEDICAID

## 2024-06-04 ENCOUNTER — APPOINTMENT (OUTPATIENT)
Dept: SPEECH THERAPY | Facility: REHABILITATION | Age: 59
DRG: 057 | End: 2024-06-04
Attending: PHYSICAL MEDICINE & REHABILITATION
Payer: MEDICAID

## 2024-06-04 ENCOUNTER — APPOINTMENT (OUTPATIENT)
Dept: OCCUPATIONAL THERAPY | Facility: REHABILITATION | Age: 59
DRG: 057 | End: 2024-06-04
Attending: PHYSICAL MEDICINE & REHABILITATION
Payer: MEDICAID

## 2024-06-04 PROCEDURE — A9270 NON-COVERED ITEM OR SERVICE: HCPCS | Performed by: STUDENT IN AN ORGANIZED HEALTH CARE EDUCATION/TRAINING PROGRAM

## 2024-06-04 PROCEDURE — A9270 NON-COVERED ITEM OR SERVICE: HCPCS | Performed by: PHYSICAL MEDICINE & REHABILITATION

## 2024-06-04 PROCEDURE — 700111 HCHG RX REV CODE 636 W/ 250 OVERRIDE (IP): Mod: JZ | Performed by: PHYSICAL MEDICINE & REHABILITATION

## 2024-06-04 PROCEDURE — 97129 THER IVNTJ 1ST 15 MIN: CPT

## 2024-06-04 PROCEDURE — 97110 THERAPEUTIC EXERCISES: CPT | Mod: CO

## 2024-06-04 PROCEDURE — 97130 THER IVNTJ EA ADDL 15 MIN: CPT

## 2024-06-04 PROCEDURE — 97530 THERAPEUTIC ACTIVITIES: CPT | Mod: CQ

## 2024-06-04 PROCEDURE — 97116 GAIT TRAINING THERAPY: CPT | Mod: CQ

## 2024-06-04 PROCEDURE — 700102 HCHG RX REV CODE 250 W/ 637 OVERRIDE(OP): Performed by: PHYSICAL MEDICINE & REHABILITATION

## 2024-06-04 PROCEDURE — 770010 HCHG ROOM/CARE - REHAB SEMI PRIVAT*

## 2024-06-04 PROCEDURE — 700102 HCHG RX REV CODE 250 W/ 637 OVERRIDE(OP): Performed by: STUDENT IN AN ORGANIZED HEALTH CARE EDUCATION/TRAINING PROGRAM

## 2024-06-04 PROCEDURE — 99232 SBSQ HOSP IP/OBS MODERATE 35: CPT | Performed by: PHYSICAL MEDICINE & REHABILITATION

## 2024-06-04 PROCEDURE — 97110 THERAPEUTIC EXERCISES: CPT | Mod: CQ

## 2024-06-04 RX ORDER — BUTALBITAL, ACETAMINOPHEN AND CAFFEINE 50; 325; 40 MG/1; MG/1; MG/1
1 TABLET ORAL ONCE
Status: COMPLETED | OUTPATIENT
Start: 2024-06-04 | End: 2024-06-04

## 2024-06-04 RX ADMIN — HYDRALAZINE HYDROCHLORIDE 50 MG: 50 TABLET ORAL at 20:27

## 2024-06-04 RX ADMIN — OMEPRAZOLE 20 MG: 20 CAPSULE, DELAYED RELEASE ORAL at 08:24

## 2024-06-04 RX ADMIN — DOCUSATE SODIUM 200 MG: 100 CAPSULE, LIQUID FILLED ORAL at 20:27

## 2024-06-04 RX ADMIN — GABAPENTIN 400 MG: 400 CAPSULE ORAL at 13:59

## 2024-06-04 RX ADMIN — GABAPENTIN 400 MG: 400 CAPSULE ORAL at 08:24

## 2024-06-04 RX ADMIN — GABAPENTIN 400 MG: 400 CAPSULE ORAL at 20:27

## 2024-06-04 RX ADMIN — HYDRALAZINE HYDROCHLORIDE 50 MG: 50 TABLET ORAL at 08:24

## 2024-06-04 RX ADMIN — Medication 5000 UNITS: at 08:24

## 2024-06-04 RX ADMIN — BUTALBITAL, ACETAMINOPHEN, AND CAFFEINE 1 TABLET: 325; 50; 40 TABLET ORAL at 19:50

## 2024-06-04 RX ADMIN — HYDRALAZINE HYDROCHLORIDE 50 MG: 50 TABLET ORAL at 14:00

## 2024-06-04 RX ADMIN — DOCUSATE SODIUM 200 MG: 100 CAPSULE, LIQUID FILLED ORAL at 08:24

## 2024-06-04 RX ADMIN — LISINOPRIL 40 MG: 20 TABLET ORAL at 05:22

## 2024-06-04 RX ADMIN — ACETAMINOPHEN 500 MG: 500 TABLET, FILM COATED ORAL at 13:59

## 2024-06-04 RX ADMIN — ENOXAPARIN SODIUM 40 MG: 100 INJECTION SUBCUTANEOUS at 17:47

## 2024-06-04 RX ADMIN — BENZONATATE 100 MG: 100 CAPSULE ORAL at 20:27

## 2024-06-04 RX ADMIN — HYDRALAZINE HYDROCHLORIDE 50 MG: 50 TABLET ORAL at 17:47

## 2024-06-04 RX ADMIN — BENZONATATE 100 MG: 100 CAPSULE ORAL at 08:24

## 2024-06-04 RX ADMIN — AMLODIPINE BESYLATE 10 MG: 5 TABLET ORAL at 05:22

## 2024-06-04 RX ADMIN — ACETAMINOPHEN 500 MG: 500 TABLET, FILM COATED ORAL at 05:22

## 2024-06-04 RX ADMIN — BENZONATATE 100 MG: 100 CAPSULE ORAL at 13:59

## 2024-06-04 RX ADMIN — TAMSULOSIN HYDROCHLORIDE 0.4 MG: 0.4 CAPSULE ORAL at 17:47

## 2024-06-04 ASSESSMENT — GAIT ASSESSMENTS
DISTANCE (FEET): 30
ASSISTIVE DEVICE: FRONT WHEEL WALKER;OTHER (COMMENTS)
GAIT LEVEL OF ASSIST: TOTAL ASSIST X 2

## 2024-06-04 ASSESSMENT — PAIN DESCRIPTION - PAIN TYPE: TYPE: ACUTE PAIN

## 2024-06-04 NOTE — THERAPY
Physical Therapy   Daily Treatment     Patient Name: Latanya Ferrera  Age:  58 y.o., Sex:  female  Medical Record #: [id number]  Today's Date: 6/4/2024     Precautions  Precautions: Fall Risk  Comments: R hemiplegia, R loss of sensation in hand, ICH    Subjective    1300  Pt was in bed upon arrival, agreeable to session.   1330  Pt seated in w/c, agreeable to session.     Objective       06/04/24 1301   PT Charge Group   PT Gait Training (Units) 2   PT Therapeutic Exercise (Units) 2   PT Therapeutic Activities (Units) 2   Supervising Physical Therapist Christina Fisher   PT Total Time Spent   PT Individual Total Time Spent (Mins) 90   Cognition    Level of Consciousness Alert   Gait Functional Level of Assist    Gait Level Of Assist Total Assist X 2  (Javed from PTA, w/c follow for safety)   Assistive Device Front Wheel Walker;Other (Comments)  (hallway rail, R AFO, R  assisted from PTA during FWW ambulation)   Distance (Feet) 30   # of Times Distance was Traveled 2   Deviation Step To;Decreased Base Of Support;Decreased Toe Off;Decreased Heel Strike;Shuffled Gait;Ataxic   Wheelchair Functional Level of Assist   Wheelchair Assist Stand by Assist   Distance Wheelchair (Feet or Distance) 250   Wheelchair Description Extra time;Limited by fatigue;Supervision for safety  (john technique)   Transfer Functional Level of Assist   Bed, Chair, Wheelchair Transfer Standby Assist   Bed Chair Wheelchair Transfer Description Initial preparation for task;Increased time;Set-up of equipment;Squat pivot transfer to wheelchair;Supervision for safety;Verbal cueing   Supine Lower Body Exercise   Bridges Two Legged;1 set of 10   Trunk Rotation 1 set of 10;Bilateral   Hip Abduction 1 set of 10;Bilateral   Hip Adduction  1 set of 10;Bilateral   Straight Leg Raises Front;1 set of 10;Bilateral   Knee to Chest 1 set of 10;Bilateral   Short Arc Quad 1 set of 10;Bilateral   Sitting Lower Body Exercises   Hip Abduction 1 set of 10;Bilateral   Hip  "Adduction 1 set of 10;Bilateral   Marching 1 set of 10   Bed Mobility    Supine to Sit Standby Assist   Sit to Supine Standby Assist   Sit to Stand Contact Guard Assist   Scooting Supervised   Rolling Standby Assist   Interdisciplinary Plan of Care Collaboration   IDT Collaboration with  Therapy Tech   Patient Position at End of Therapy Seated  (tranistion care to tech for )   PT DME Recommendations   Wheelchair 18\" Width;Jaciel Height (16\"-17\");Lightweight;Removable/Flip Back Armrests;Standard Leg Rests;Anti-tippers   Cushion Standard   Assistive Device Front Wheeled Walker;Other (Comments)  (TBD)     1300  Gait training w/ RAFO, hallway rail/FWW w/ PTA assisted w/ RUE  30 ftx2.        Assessment    Pt tolerated both sessions well, pt's dtr brought in slippers which is bigger than her shoes but doesn't has enough posterior support, required ace wrapping RAFO on slipper during ambulation. STS from EOM w/ various height at FWW level to work on sequencing and closed chain strengthening, pt completed w/ CGA and min cues. RLE hyperext in stance w/o RAFO during STS but does need the anterior support from anterior shell AFO during gait d/t R knee buckling.      Strengths: Able to follow instructions, Alert and oriented, Independent prior level of function, Motivated for self care and independence, Pleasant and cooperative, Supportive family, Willingly participates in therapeutic activities  Barriers: Decreased endurance, Difficulty following instructions, Fatigue, Hemiplegia, Home accessibility, Hypertension, Impaired balance, Impaired carryover of learning, Limited mobility (lives alone, decreased knowledge of condition, limited daytime family support)    Plan    Ambulation w/ FWW v.s jaciel walker + black anterior shell AFO  NMRE RLE  RLE Strengthening and endurance  Mat program  Standing balance  Shuttle for knee control  Aquatic therapy when appropriate     DME  PT DME Recommendations  Wheelchair: 18\" Width, Jaciel " "Height (16\"-17\"), Removable/Flip Back Armrests, Standard Leg Rests, Anti-tippers (may need specialty chair)  Cushion: Standard  Assistive Device: Other (Comments) (TBD pending progress: HW>quad cane>cane)  Additional Equipment: Other (Comments) (TBD)     Passport items to be completed:  Get in/out of bed safely, in/out of a vehicle, safely use mobility device, walk or wheel around home/community, navigate up and down stairs, show how to get up/down from the ground, ensure home is accessible, demonstrate HEP, complete caregiver training    Physical Therapy Problems (Active)       Problem: Balance       Dates: Start:  05/30/24         Goal: STG-Within one week, patient will maintain static standing c/ 1UE support at SBA or better, >/= 2 minutes.        Dates: Start:  05/30/24               Problem: Mobility       Dates: Start:  05/30/24         Goal: STG-Within one week, patient will ambulate household distances >/= 50' c/ LRAD and TotalA or better.        Dates: Start:  05/30/24               Problem: Mobility Transfers       Dates: Start:  05/30/24         Goal: STG-Within one week, patient will transfer bed to chair c/ Sonia or better c/ appropriate compensatory method (reach pivot vs SPT).       Dates: Start:  05/30/24               Problem: PT-Long Term Goals       Dates: Start:  05/30/24         Goal: LTG-By discharge, patient will ambulate >/= 300' c/ LRAD and CGA or better.        Dates: Start:  05/30/24            Goal: LTG-By discharge, patient will transfer one surface to another c/ Boni.        Dates: Start:  05/30/24            Goal: LTG-By discharge, patient will ambulate up/down 2 stairs c/ CGA or better to access home.        Dates: Start:  05/30/24            Goal: LTG-By discharge, patient will transfer in/out of a car c/ CGA or better.        Dates: Start:  05/30/24              "

## 2024-06-04 NOTE — THERAPY
Speech Language Pathology  Daily Treatment     Patient Name: Latanya Ferrera  Age:  58 y.o., Sex:  female  Medical Record #: [id number]  Today's Date: 6/4/2024     Precautions  Precautions: Fall Risk  Comments: R hemiplegia, R loss of sensation in hand, ICH    Subjective    Pt pleasant and appeared in good spirits, endorsing positive outlook re: recovery.     Objective       06/04/24 0834   Treatment Charges   SLP Cognitive Skill Development First 15 Minutes 1   SLP Cognitive Skill Development Additional 15 Minutes 3   SLP Total Time Spent   SLP Individual Total Time Spent (Mins) 60   Cognition   Functional Math / Financial Management Supervision (5)   Interdisciplinary Plan of Care Collaboration   IDT Collaboration with  Certified Nursing Assistant   Patient Position at End of Therapy In Bed;Call Light within Reach;Tray Table within Reach   Collaboration Comments transfer of care to CNA in room   Speech Language Pathologist Assigned   Assigned SLP / Treatment Time / Comments EA/60 cog         Assessment    Fxl $ mngt tasks continued (who has more bills/coins) - SLP acting as scribe - 62% IND, pt benefits from each denomination broken into single units and then summing, verbal and gestural cues to attend to details.     Strengths: Motivated for self care and independence, Pleasant and cooperative, Willingly participates in therapeutic activities, Supportive family, Making steady progress towards goals, Alert and oriented, Effective communication skills  Barriers: Hemiparesis, Emotional lability, Impaired functional cognition    Plan    Continue to target attention, working memory/mental manipulation    Passport items to be completed:  manage finances, arrive to therapy appointments on time, complete daily memory log entries, solve problems related to safety situations, review education related to hospitalization, complete caregiver training     Speech Therapy Problems (Active)       Problem: Memory STGs       Dates:  Start:  05/30/24         Goal: STG-Within one week, patient will demonstrate use of memory strategies in order to recall information from therapies after a 2/hr delay.        Dates: Start:  05/30/24         Goal Note filed on 06/03/24 1300 by Mya Steven MS,CCC-SLP       Will continue to target.                  Problem: Problem Solving STGs       Dates: Start:  05/30/24         Goal: STG-Within one week, patient will complete alternating attention tasks with 80% accuracy given SPV       Dates: Start:  05/30/24         Goal Note filed on 06/03/24 1300 by Mya Steven MS,CCC-SLP       Will continue to target.                  Problem: Speech/Swallowing LTGs       Dates: Start:  05/30/24         Goal: LTG-By discharge, patient will solve complex problems related to IADL's in order to d/c home with mod I        Dates: Start:  05/30/24

## 2024-06-04 NOTE — PROGRESS NOTES
NURSING DAILY NOTE    Name: Latanya Ferrera   Date of Admission: 5/29/2024   Admitting Diagnosis: Hemorrhagic stroke (HCC)  Attending Physician: Sofi Jules D.o.  Allergies: Patient has no known allergies.    Safety  Patient Assist     Patient Precautions  Fall Risk  Precaution Comments  R hemiplegia, R loss of sensation in hand, ICH  Bed Transfer Status  Contact Guard Assist  Toilet Transfer Status   Contact Guard Assist  Assistive Devices  Rails, Wheelchair  Oxygen  None - Room Air  Diet/Therapeutic Dining  Current Diet Order   Procedures    Diet Order Diet: Regular     Pill Administration  whole  Agitated Behavioral Scale     ABS Level of Severity       Fall Risk  Has the patient had a fall this admission?   No  Carly Fisher Fall Risk Scoring  16, HIGH RISK  Fall Risk Safety Measures  bed alarm, chair alarm, and poor balance    Vitals  Temperature: 36.2 °C (97.1 °F)  Temp src: Temporal  Pulse: 71  Respiration: 16  Blood Pressure: 125/74  Blood Pressure MAP (Calculated): 91 MM HG  BP Location: Right, Upper Arm  Patient BP Position: Supine     Oxygen  Pulse Oximetry: 97 %  O2 (LPM): 0  O2 Delivery Device: None - Room Air    Bowel and Bladder  Last Bowel Movement  06/03/24  Stool Type  Type 2: Sausage shaped, but lumpy  Bowel Device     Continent  Bladder: Did not void   Bowel: No movement  Bladder Function  Urine Void (mL): 300 ml  Urinary Options: Yes  Urine Color: Yellow  Genitourinary Assessment   Bladder Assessment (WDL):  WDL Except  Ramirez Catheter: Present with Active Order  Ramirez Reasons per MD Order: Acute urinary retention or bladder outlet obstruction  Ramirez Care: Given with Soap and Water  Urinary Elimination: Catheter (Document on LDA)  Urine Color: Yellow  Bladder Device: Indwelling Catheter  $ Bladder Scan Results (mL): 57  Bladder Medications: Yes    Skin  Xavier Score   17  Sensory Interventions   Bed Types: Standard/Trauma Mattress  Skin  Preventative Measures: Waffle Overlay  Moisture Interventions  Moisturizers/Barriers: Barrier Wipes  Containment Devices: Indwelling Catheter      Pain  Pain Rating Scale  4 - Distracts me, can do usual activities  Pain Location  Head, Neck, Teeth  Pain Location Orientation  Posterior  Pain Interventions   Medication (see MAR)    ADLs    Bathing   Family / Significant Other  Linen Change   Partial  Personal Hygiene     Chlorhexidine Bath      Oral Care  Brushed Teeth  Teeth/Dentures     Shave     Nutrition Percentage Eaten  Between 50-75% Consumed  Environmental Precautions     Patient Turns/Positioning  Patient Turns Self from Side to Side  Patient Turns Assistance/Tolerance  General Weakness  Bed Positions     Head of Bed Elevated         Psychosocial/Neurologic Assessment  Psychosocial Assessment  Psychosocial (WDL):  WDL Except  Patient Behaviors: Fatigue  Family Behaviors: No Family Present  Neurologic Assessment  Neuro (WDL): Exceptions to WDL  Level of Consciousness: Alert  Orientation Level: Oriented X4  Cognition: Follows commands, Appropriate attention/concentration  Speech: Clear  Pupil Assesment: No  Motor Function/Sensation Assessment: Sensation, Motor strength  RUE Sensation: Numbness  Muscle Strength Right Arm: Fair Strength against Gravity but No Resistance  LUE Sensation: Full sensation  Muscle Strength Left Arm: Normal Strength Against Gravity and Full Resistance  RLE Sensation: Numbness  Muscle Strength Right Leg: Fair Strength against Gravity but No Resistance  LLE Sensation: Full sensation  Muscle Strength Left Leg: Normal Strength Against Gravity and Full Resistance  EENT (WDL):  Within Defined Limits    Cardio/Pulmonary Assessment  Edema   RLE Edema: Trace  LLE Edema: Trace  Respiratory Breath Sounds  RUL Breath Sounds: Clear  RML Breath Sounds: Clear  RLL Breath Sounds: Diminished  OZIEL Breath Sounds: Clear  LLL Breath Sounds: Diminished  Cardiac Assessment   Cardiac (WDL):  WDL Except

## 2024-06-04 NOTE — THERAPY
"Occupational Therapy  Daily Treatment     Patient Name: Latanya Ferrera  Age:  58 y.o., Sex:  female  Medical Record #: [id number]  Today's Date: 6/4/2024     Precautions  Precautions: (P) Fall Risk  Comments: (P) R hemiplegia, R loss of sensation in hand, ICH         Subjective    \"I just got back into bed from my last therapy and a little tired.\" Pt was supine in lao position awake and agreeable for OT session.      Objective       06/04/24 1031   OT Charge Group   OT Therapeutic Exercise (Units) 2   OT Total Time Spent   OT Individual Total Time Spent (Mins) 30   Precautions   Precautions Fall Risk   Comments R hemiplegia, R loss of sensation in hand, ICH   Sitting Upper Body Exercises   Chest Press 2 sets of 10;Bilateral;Light Resistance Theraband   Bicep Curls 3 sets of 10;Right ;Weight (See Comments for lbs)  (#1)   Pronation / Supination 3 sets of 10;Right ;Weight (See Comments for lbs)  (#1)   Wrist Flexion / Extension 3 sets of 10;Right ;Weight (See Comments for lbs)  (#1)   Balance   Sitting Balance (Static) Fair   Sitting Balance (Dynamic) Fair -   Bed Mobility    Supine to Sit Standby Assist   Sit to Supine Supervised   Scooting Supervised   Interdisciplinary Plan of Care Collaboration   Patient Position at End of Therapy In Bed;Bed Alarm On;Call Light within Reach;Tray Table within Reach;Phone within Reach   Pt was agreeable for seated EOB thera ex d/t fatigue and time constriction.   Thera ex:  -Pt was able to grasp a 1lb weight during bicep curs as well as pronation/sup and wrist flex/ext however needed support to assist in wrist control throughout all thera ex.   -Needed UE support to hold pink thera band while completing chest press using BUE, however pt requested to attempt on her own. Theraband was wrapped around right hand and pt was able to complete 1 set of chest press using at SBA for UE support.      Assessment    Pt tolerated session well w/ focus on RUE strengthening and neuro re-ed to " maximize Ind in functional tasks. Needed rest breaks after each set d/t low UE activity tolerance, however is motivated to return to PLOF.   Strengths: Able to follow instructions, Good insight into deficits/needs, Independent prior level of function, Manages pain appropriately, Motivated for self care and independence, Pleasant and cooperative, Supportive family, Willingly participates in therapeutic activities  Barriers: Decreased endurance, Hemiparesis, Home accessibility, Impaired activity tolerance, Impaired balance (Impaired coordination/motor control)    Plan    ADLs, safety with transfers, neuro re-ed R UE, standing tolerance/balance,  3x/week with therapy techs for adjunct therapy     DME  OT DME Recommendations  Bathroom Equipment: Tub Transfer Bench (Medicaid Only)  Additional Equipment: Other (Comments) (TBD)    Passport items to be completed:  Perform bathroom transfers, complete dressing, complete feeding, get ready for the day, prepare a simple meal, participate in household tasks, adapt home for safety needs, demonstrate home exercise program, complete caregiver training     Occupational Therapy Goals (Active)       Problem: Bathing       Dates: Start:  05/30/24         Goal: STG-Within one week, patient will bathe with MIN A and LRAD       Dates: Start:  05/30/24         Goal Note filed on 06/03/24 0946 by Fox Castro, OT/L       Not assessed, patient has declined bathing at rehab                 Problem: Dressing       Dates: Start:  06/03/24         Goal: STG-Within one week, patient will dress LB with setup and SBA and commode over toilet       Dates: Start:  06/03/24               Problem: Functional Transfers       Dates: Start:  06/03/24         Goal: STG-Within one week, patient will transfer to toilet with SBA using grab bar and commode over toilet       Dates: Start:  06/03/24               Problem: OT Long Term Goals       Dates: Start:  05/30/24         Goal: LTG-By discharge,  patient will complete basic self care tasks with SPV-MOD I and LRAD       Dates: Start:  05/30/24            Goal: LTG-By discharge, patient will perform bathroom transfers with SPV-MOD I and LRAD       Dates: Start:  05/30/24

## 2024-06-04 NOTE — PROGRESS NOTES
"  Physical Medicine & Rehabilitation Progress Note    Encounter Date: 6/4/2024    Chief Complaint: Doing well     Interval Events (Subjective):  VSS  BM 6/3  Voiding with low PVRs    Seen and examined in her room. Just finished with therapy. Has not had headache or neck pain since her session yesterday afternoon. Was not associated with any new or changed neuro symptoms. She does not have HA now, she thinks she overdid it because she was tired.       ROS: 14 point ROS negative unless otherwise specified in the HPI    Objective:  VITAL SIGNS: /74   Pulse 71   Temp 36.2 °C (97.1 °F) (Temporal)   Resp 16   Ht 1.676 m (5' 6\")   Wt 82.3 kg (181 lb 8 oz)   SpO2 97%   BMI 29.29 kg/m²     GEN: No apparent distress  HEENT: Head normocephalic, atraumatic.  Sclera nonicteric bilaterally, no ocular discharge appreciated bilaterally.  PULMONARY: Breathing nonlabored on room air, no respiratory accessory muscle use.  Not requiring supplemental oxygen.  SKIN: No appreciable skin breakdown on exposed areas of skin.  PSYCH: Mood and affect within normal limits.  NEURO: Awake alert.  Conversational.  Logical thought content. Neuro exam slightly improved in terms of right hemiparesis.       Laboratory Values:  Recent Results (from the past 72 hour(s))   CBC WITH DIFFERENTIAL    Collection Time: 06/03/24  5:40 AM   Result Value Ref Range    WBC 10.8 4.8 - 10.8 K/uL    RBC 3.75 (L) 4.20 - 5.40 M/uL    Hemoglobin 10.9 (L) 12.0 - 16.0 g/dL    Hematocrit 35.4 (L) 37.0 - 47.0 %    MCV 94.4 81.4 - 97.8 fL    MCH 29.1 27.0 - 33.0 pg    MCHC 30.8 (L) 32.2 - 35.5 g/dL    RDW 44.4 35.9 - 50.0 fL    Platelet Count 279 164 - 446 K/uL    MPV 10.6 9.0 - 12.9 fL    Neutrophils-Polys 71.10 44.00 - 72.00 %    Lymphocytes 18.00 (L) 22.00 - 41.00 %    Monocytes 7.00 0.00 - 13.40 %    Eosinophils 2.70 0.00 - 6.90 %    Basophils 0.50 0.00 - 1.80 %    Immature Granulocytes 0.70 0.00 - 0.90 %    Nucleated RBC 0.00 0.00 - 0.20 /100 WBC    " Neutrophils (Absolute) 7.67 (H) 1.82 - 7.42 K/uL    Lymphs (Absolute) 1.94 1.00 - 4.80 K/uL    Monos (Absolute) 0.76 0.00 - 0.85 K/uL    Eos (Absolute) 0.29 0.00 - 0.51 K/uL    Baso (Absolute) 0.05 0.00 - 0.12 K/uL    Immature Granulocytes (abs) 0.08 0.00 - 0.11 K/uL    NRBC (Absolute) 0.00 K/uL   Comp Metabolic Panel    Collection Time: 06/03/24  5:40 AM   Result Value Ref Range    Sodium 140 135 - 145 mmol/L    Potassium 4.2 3.6 - 5.5 mmol/L    Chloride 109 96 - 112 mmol/L    Co2 19 (L) 20 - 33 mmol/L    Anion Gap 12.0 7.0 - 16.0    Glucose 110 (H) 65 - 99 mg/dL    Bun 19 8 - 22 mg/dL    Creatinine 0.59 0.50 - 1.40 mg/dL    Calcium 9.2 8.5 - 10.5 mg/dL    Correct Calcium 9.6 8.5 - 10.5 mg/dL    AST(SGOT) 14 12 - 45 U/L    ALT(SGPT) 23 2 - 50 U/L    Alkaline Phosphatase 90 30 - 99 U/L    Total Bilirubin 0.3 0.1 - 1.5 mg/dL    Albumin 3.5 3.2 - 4.9 g/dL    Total Protein 6.5 6.0 - 8.2 g/dL    Globulin 3.0 1.9 - 3.5 g/dL    A-G Ratio 1.2 g/dL   PHOSPHORUS    Collection Time: 06/03/24  5:40 AM   Result Value Ref Range    Phosphorus 4.5 2.5 - 4.5 mg/dL   ESTIMATED GFR    Collection Time: 06/03/24  5:40 AM   Result Value Ref Range    GFR (CKD-EPI) 105 >60 mL/min/1.73 m 2       Medications:  Scheduled Medications   Medication Dose Frequency    gabapentin  400 mg TID    benzonatate  100 mg TID    docusate sodium  200 mg BID    vitamin D3  5,000 Units DAILY    enoxaparin (LOVENOX) injection  40 mg DAILY AT 1800    Pharmacy Consult Request  1 Each PHARMACY TO DOSE    omeprazole  20 mg DAILY    senna-docusate  2 Tablet Q EVENING    amLODIPine  10 mg Q DAY    hydrALAZINE  50 mg 4X/DAY    lisinopril  40 mg Q DAY    tamsulosin  0.4 mg AFTER DINNER     PRN medications: Respiratory Therapy Consult, hydrALAZINE, senna-docusate **AND** polyethylene glycol/lytes, bisacodyl, magnesium hydroxide, acetaminophen, carboxymethylcellulose, benzocaine-menthol, mag hydrox-al hydrox-simeth, ondansetron **OR** ondansetron, traZODone, sodium  chloride, ipratropium-albuterol, melatonin    Diet:  Current Diet Order   Procedures    Diet Order Diet: Regular       Medical Decision Making and Plan:  Left Basal Ganglia ICH secondary to hypertensive emergency  Right Hemiparesis  PT and OT for mobility and ADLs. Per guidelines, 15 hours per week between PT, OT and/or SLP.  Follow-up Neurology (Dr. Babb)  Secondary stroke ppx: Anti-hypertensives    Headache  Neck Pain  /3 VSS. No other symptoms   Resolved. Tylenol relieved her symptoms. No change in neuro status.      Hypertension  Continue Hydralazine 50mg q6 hours  Continue Lisinopril 40mg daily   Continue Amlodipine 10mg daily    BP stable     Leukocytosis  Improving on admission but unclear cause  Continue Rocephin x 2 doses (through ) - ok to d/c IV after  6/3 high normal in 10's. Historically has had WBC count fluctuating between 10-     Urinary Retention  Has otero - required CIC   Continue Flomax 0.4mg nightly    Per patient preference keep otero another 1-2 days. D/w her r/o CAUTI. Patient and dtr aware.   6/3 Discontinue IDFC   Voiding with low PVRs    Chronic Cough  Secondary to years of smoking. D/w patient and daughter, nothing has changed in terms of her cough, nothing new or concerning to them.   Tessalon perls scheduled TID    Hyperphosphatemia  6/3 Resolved, Ph normal     Anemia  6/3 Stable at 10.9     Pain  Tylenol PRN    Neuropathic Pain  6/3 Continue Gabapentin - increase to 400mg TID     GI Ppx  Patient on Prilosec for GERD prophylaxis.      Bowel   Patient on Senna-docusate for constipation prophylaxis.         Upcoming Labs/imagin/6     DVT PROPHYLAXIS: None - Hemorrhagic stroke. Check US - negative. Per Neuro note, clear for DVT chemo-ppx once MRI completed (completed ). Lovenox 40 mg SQ started 6/3.      HOSPITALIST FOLLOWING: None     CODE STATUS: FULL CODE     DISPO: Home with minimal support     VIANCA: 6/15/24     MEDS SENT TO: Not [facility]     DISCHARGE SPECIALIST  FOLLOW UP: Neurology     Patient to scheduled follow up with their PCP within 2 weeks from discharge from the [facility].

## 2024-06-04 NOTE — PROGRESS NOTES
NURSING DAILY NOTE    Name: Latanya Ferrera   Date of Admission: 5/29/2024   Admitting Diagnosis: Hemorrhagic stroke (HCC)  Attending Physician: Sofi Jules D.o.  Allergies: Patient has no known allergies.    Safety  Patient Assist     Patient Precautions  Fall Risk  Precaution Comments  R hemiplegia, R loss of sensation in hand, ICH  Bed Transfer Status  Contact Guard Assist  Toilet Transfer Status   Contact Guard Assist  Assistive Devices  Rails, Wheelchair  Oxygen  None - Room Air  Diet/Therapeutic Dining  Current Diet Order   Procedures    Diet Order Diet: Regular     Pill Administration  whole  Agitated Behavioral Scale     ABS Level of Severity       Fall Risk  Has the patient had a fall this admission?   No  Carly Fisher Fall Risk Scoring  16, HIGH RISK  Fall Risk Safety Measures  bed alarm and chair alarm    Vitals  Temperature: 36.4 °C (97.5 °F)  Temp src: Oral  Pulse: 76  Respiration: 20  Blood Pressure: 134/72  Blood Pressure MAP (Calculated): 93 MM HG  BP Location: Left, Upper Arm  Patient BP Position: Supine     Oxygen  Pulse Oximetry: 97 %  O2 (LPM): 0  O2 Delivery Device: None - Room Air    Bowel and Bladder  Last Bowel Movement  06/03/24  Stool Type  Type 2: Sausage shaped, but lumpy  Bowel Device     Continent  Bladder: Did not void   Bowel: No movement  Bladder Function  Urine Void (mL): 300 ml  Urinary Options: Yes  Urine Color: Yellow  Genitourinary Assessment   Bladder Assessment (WDL):  WDL Except  Ramirez Catheter: Present with Active Order  Ramirez Reasons per MD Order: Acute urinary retention or bladder outlet obstruction  Ramirez Care: Given with Soap and Water  Urinary Elimination: Catheter (Document on LDA)  Urine Color: Yellow  Bladder Device: Indwelling Catheter  $ Bladder Scan Results (mL): 57  Bladder Medications: Yes    Skin  Xavier Score   17  Sensory Interventions   Bed Types: Standard/Trauma Mattress with Overlay  Skin  Preventative Measures: Waffle Overlay  Moisture Interventions  Moisturizers/Barriers: Barrier Cream  Containment Devices: Indwelling Catheter      Pain  Pain Rating Scale  8 - Awful, hard to do anything  Pain Location  Neck, Head  Pain Location Orientation  Posterior  Pain Interventions   Declines    ADLs    Bathing   Family / Significant Other  Linen Change   Partial  Personal Hygiene     Chlorhexidine Bath      Oral Care  Brushed Teeth  Teeth/Dentures     Shave     Nutrition Percentage Eaten  Between 50-75% Consumed  Environmental Precautions     Patient Turns/Positioning  Patient Turns Self from Side to Side  Patient Turns Assistance/Tolerance  General Weakness  Bed Positions     Head of Bed Elevated         Psychosocial/Neurologic Assessment  Psychosocial Assessment  Psychosocial (WDL):  WDL Except  Patient Behaviors: Fatigue  Family Behaviors: No Family Present  Neurologic Assessment  Neuro (WDL): Exceptions to WDL  Level of Consciousness: Alert  Orientation Level: Oriented X4  Cognition: Follows commands, Appropriate attention/concentration  Speech: Clear  Pupil Assesment: No  Motor Function/Sensation Assessment: Sensation, Motor strength  RUE Sensation: Numbness  Muscle Strength Right Arm: Fair Strength against Gravity but No Resistance  LUE Sensation: Full sensation  Muscle Strength Left Arm: Normal Strength Against Gravity and Full Resistance  RLE Sensation: Numbness  Muscle Strength Right Leg: Fair Strength against Gravity but No Resistance  LLE Sensation: Full sensation  Muscle Strength Left Leg: Normal Strength Against Gravity and Full Resistance  EENT (WDL):  Within Defined Limits    Cardio/Pulmonary Assessment  Edema   RLE Edema: Trace  LLE Edema: Trace  Respiratory Breath Sounds  RUL Breath Sounds: Clear  RML Breath Sounds: Clear  RLL Breath Sounds: Diminished  OZIEL Breath Sounds: Clear  LLL Breath Sounds: Diminished  Cardiac Assessment   Cardiac (WDL):  WDL Except

## 2024-06-05 ENCOUNTER — HOSPITAL ENCOUNTER (OUTPATIENT)
Dept: RADIOLOGY | Facility: MEDICAL CENTER | Age: 59
End: 2024-06-05

## 2024-06-05 ENCOUNTER — APPOINTMENT (OUTPATIENT)
Dept: OCCUPATIONAL THERAPY | Facility: REHABILITATION | Age: 59
DRG: 057 | End: 2024-06-05
Attending: PHYSICAL MEDICINE & REHABILITATION
Payer: MEDICAID

## 2024-06-05 ENCOUNTER — APPOINTMENT (OUTPATIENT)
Dept: PHYSICAL THERAPY | Facility: REHABILITATION | Age: 59
DRG: 057 | End: 2024-06-05
Attending: PHYSICAL MEDICINE & REHABILITATION
Payer: MEDICAID

## 2024-06-05 ENCOUNTER — APPOINTMENT (OUTPATIENT)
Dept: SPEECH THERAPY | Facility: REHABILITATION | Age: 59
DRG: 057 | End: 2024-06-05
Attending: PHYSICAL MEDICINE & REHABILITATION
Payer: MEDICAID

## 2024-06-05 ENCOUNTER — HOSPITAL ENCOUNTER (OUTPATIENT)
Dept: RADIOLOGY | Facility: MEDICAL CENTER | Age: 59
End: 2024-06-05
Attending: PHYSICAL MEDICINE & REHABILITATION
Payer: MEDICAID

## 2024-06-05 PROCEDURE — 97530 THERAPEUTIC ACTIVITIES: CPT | Mod: CQ

## 2024-06-05 PROCEDURE — 97112 NEUROMUSCULAR REEDUCATION: CPT

## 2024-06-05 PROCEDURE — 70450 CT HEAD/BRAIN W/O DYE: CPT

## 2024-06-05 PROCEDURE — 700102 HCHG RX REV CODE 250 W/ 637 OVERRIDE(OP): Performed by: PHYSICAL MEDICINE & REHABILITATION

## 2024-06-05 PROCEDURE — 97116 GAIT TRAINING THERAPY: CPT | Mod: CQ

## 2024-06-05 PROCEDURE — 97130 THER IVNTJ EA ADDL 15 MIN: CPT

## 2024-06-05 PROCEDURE — 97129 THER IVNTJ 1ST 15 MIN: CPT

## 2024-06-05 PROCEDURE — 97112 NEUROMUSCULAR REEDUCATION: CPT | Mod: CQ

## 2024-06-05 PROCEDURE — 99232 SBSQ HOSP IP/OBS MODERATE 35: CPT | Performed by: PHYSICAL MEDICINE & REHABILITATION

## 2024-06-05 PROCEDURE — 97530 THERAPEUTIC ACTIVITIES: CPT

## 2024-06-05 PROCEDURE — 770010 HCHG ROOM/CARE - REHAB SEMI PRIVAT*

## 2024-06-05 PROCEDURE — A9270 NON-COVERED ITEM OR SERVICE: HCPCS | Performed by: PHYSICAL MEDICINE & REHABILITATION

## 2024-06-05 PROCEDURE — 97110 THERAPEUTIC EXERCISES: CPT | Mod: CQ

## 2024-06-05 RX ADMIN — HYDRALAZINE HYDROCHLORIDE 50 MG: 50 TABLET ORAL at 08:30

## 2024-06-05 RX ADMIN — HYDRALAZINE HYDROCHLORIDE 75 MG: 50 TABLET ORAL at 17:38

## 2024-06-05 RX ADMIN — ACETAMINOPHEN 500 MG: 500 TABLET, FILM COATED ORAL at 20:11

## 2024-06-05 RX ADMIN — Medication 5000 UNITS: at 08:29

## 2024-06-05 RX ADMIN — HYDRALAZINE HYDROCHLORIDE 75 MG: 50 TABLET ORAL at 13:01

## 2024-06-05 RX ADMIN — LISINOPRIL 40 MG: 20 TABLET ORAL at 07:10

## 2024-06-05 RX ADMIN — BENZONATATE 100 MG: 100 CAPSULE ORAL at 08:30

## 2024-06-05 RX ADMIN — AMLODIPINE BESYLATE 10 MG: 5 TABLET ORAL at 07:10

## 2024-06-05 RX ADMIN — BENZONATATE 100 MG: 100 CAPSULE ORAL at 15:34

## 2024-06-05 RX ADMIN — BENZONATATE 100 MG: 100 CAPSULE ORAL at 20:15

## 2024-06-05 RX ADMIN — ACETAMINOPHEN 500 MG: 500 TABLET, FILM COATED ORAL at 07:10

## 2024-06-05 RX ADMIN — TAMSULOSIN HYDROCHLORIDE 0.4 MG: 0.4 CAPSULE ORAL at 17:38

## 2024-06-05 RX ADMIN — GABAPENTIN 400 MG: 400 CAPSULE ORAL at 08:30

## 2024-06-05 RX ADMIN — DOCUSATE SODIUM 200 MG: 100 CAPSULE, LIQUID FILLED ORAL at 08:29

## 2024-06-05 RX ADMIN — ACETAMINOPHEN 500 MG: 500 TABLET, FILM COATED ORAL at 15:34

## 2024-06-05 RX ADMIN — GABAPENTIN 400 MG: 400 CAPSULE ORAL at 15:34

## 2024-06-05 RX ADMIN — GABAPENTIN 400 MG: 400 CAPSULE ORAL at 20:11

## 2024-06-05 RX ADMIN — HYDRALAZINE HYDROCHLORIDE 75 MG: 50 TABLET ORAL at 20:15

## 2024-06-05 RX ADMIN — OMEPRAZOLE 20 MG: 20 CAPSULE, DELAYED RELEASE ORAL at 08:30

## 2024-06-05 ASSESSMENT — PAIN DESCRIPTION - PAIN TYPE: TYPE: ACUTE PAIN

## 2024-06-05 ASSESSMENT — GAIT ASSESSMENTS
ASSISTIVE DEVICE: FRONT WHEEL WALKER
DISTANCE (FEET): 25
GAIT LEVEL OF ASSIST: MODERATE ASSIST

## 2024-06-05 ASSESSMENT — ACTIVITIES OF DAILY LIVING (ADL)
BED_CHAIR_WHEELCHAIR_TRANSFER_DESCRIPTION: INITIAL PREPARATION FOR TASK;SET-UP OF EQUIPMENT;SUPERVISION FOR SAFETY;VERBAL CUEING
BED_CHAIR_WHEELCHAIR_TRANSFER_DESCRIPTION: INITIAL PREPARATION FOR TASK;SET-UP OF EQUIPMENT;SUPERVISION FOR SAFETY;VERBAL CUEING

## 2024-06-05 ASSESSMENT — FIBROSIS 4 INDEX: FIB4 SCORE: 0.59

## 2024-06-05 NOTE — PROGRESS NOTES
"  Physical Medicine & Rehabilitation Progress Note    Encounter Date: 6/5/2024    Chief Complaint: Headache better    Interval Events (Subjective):  VS: BP 1x 147 and 140  BM 6/4  Voiding      Seen and examined in her room. Nursing reports patient with severe headache last night stating \"it hurts so bad I feel like I'm having another stroke\". She reports that her headache is better now. The on call physician ordered Fioricet. The patient didn't love this medication.       ROS: 14 point ROS negative unless otherwise specified in the HPI    Objective:  VITAL SIGNS: BP (!) 140/82   Pulse 86   Temp 36.6 °C (97.9 °F) (Temporal)   Resp 18   Ht 1.676 m (5' 6\")   Wt 81.6 kg (180 lb)   SpO2 98%   BMI 29.05 kg/m²     GEN: No apparent distress  HEENT: Head normocephalic, atraumatic.  Sclera nonicteric bilaterally, no ocular discharge appreciated bilaterally.  CV: Extremities warm and well-perfused, no peripheral edema appreciated bilaterally.  PULMONARY: Breathing nonlabored on room air, no respiratory accessory muscle use.  Not requiring supplemental oxygen.  ABD: Soft, nontender.  SKIN: No appreciable skin breakdown on exposed areas of skin.  PSYCH: Mood and affect within normal limits.  NEURO: Awake alert.  Conversational.  Logical thought content. Dysarthria improving  Strength exam:  Motor Exam Upper Extremities   ? Myotome R L   Shoulder Abduction C5 4 5   Elbow flexion C5 4 5   Wrist extension C6 4 5   Elbow extension C7 5 5   Finger flexion C8 3 5   Finger abduction T1 3 5      Motor Exam Lower Extremities  ? Myotome R L   Hip flexion L2 4 5   Knee extension L3 5 5   Ankle dorsiflexion L4 4 5   Toe extension L5 4 5   Ankle plantarflexion S1 4 5      Clonus at R ankle    Laboratory Values:  Recent Results (from the past 72 hour(s))   CBC WITH DIFFERENTIAL    Collection Time: 06/03/24  5:40 AM   Result Value Ref Range    WBC 10.8 4.8 - 10.8 K/uL    RBC 3.75 (L) 4.20 - 5.40 M/uL    Hemoglobin 10.9 (L) 12.0 - 16.0 " g/dL    Hematocrit 35.4 (L) 37.0 - 47.0 %    MCV 94.4 81.4 - 97.8 fL    MCH 29.1 27.0 - 33.0 pg    MCHC 30.8 (L) 32.2 - 35.5 g/dL    RDW 44.4 35.9 - 50.0 fL    Platelet Count 279 164 - 446 K/uL    MPV 10.6 9.0 - 12.9 fL    Neutrophils-Polys 71.10 44.00 - 72.00 %    Lymphocytes 18.00 (L) 22.00 - 41.00 %    Monocytes 7.00 0.00 - 13.40 %    Eosinophils 2.70 0.00 - 6.90 %    Basophils 0.50 0.00 - 1.80 %    Immature Granulocytes 0.70 0.00 - 0.90 %    Nucleated RBC 0.00 0.00 - 0.20 /100 WBC    Neutrophils (Absolute) 7.67 (H) 1.82 - 7.42 K/uL    Lymphs (Absolute) 1.94 1.00 - 4.80 K/uL    Monos (Absolute) 0.76 0.00 - 0.85 K/uL    Eos (Absolute) 0.29 0.00 - 0.51 K/uL    Baso (Absolute) 0.05 0.00 - 0.12 K/uL    Immature Granulocytes (abs) 0.08 0.00 - 0.11 K/uL    NRBC (Absolute) 0.00 K/uL   Comp Metabolic Panel    Collection Time: 06/03/24  5:40 AM   Result Value Ref Range    Sodium 140 135 - 145 mmol/L    Potassium 4.2 3.6 - 5.5 mmol/L    Chloride 109 96 - 112 mmol/L    Co2 19 (L) 20 - 33 mmol/L    Anion Gap 12.0 7.0 - 16.0    Glucose 110 (H) 65 - 99 mg/dL    Bun 19 8 - 22 mg/dL    Creatinine 0.59 0.50 - 1.40 mg/dL    Calcium 9.2 8.5 - 10.5 mg/dL    Correct Calcium 9.6 8.5 - 10.5 mg/dL    AST(SGOT) 14 12 - 45 U/L    ALT(SGPT) 23 2 - 50 U/L    Alkaline Phosphatase 90 30 - 99 U/L    Total Bilirubin 0.3 0.1 - 1.5 mg/dL    Albumin 3.5 3.2 - 4.9 g/dL    Total Protein 6.5 6.0 - 8.2 g/dL    Globulin 3.0 1.9 - 3.5 g/dL    A-G Ratio 1.2 g/dL   PHOSPHORUS    Collection Time: 06/03/24  5:40 AM   Result Value Ref Range    Phosphorus 4.5 2.5 - 4.5 mg/dL   ESTIMATED GFR    Collection Time: 06/03/24  5:40 AM   Result Value Ref Range    GFR (CKD-EPI) 105 >60 mL/min/1.73 m 2       Medications:  Scheduled Medications   Medication Dose Frequency    gabapentin  400 mg TID    benzonatate  100 mg TID    docusate sodium  200 mg BID    vitamin D3  5,000 Units DAILY    enoxaparin (LOVENOX) injection  40 mg DAILY AT 1800    Pharmacy Consult Request   1 Each PHARMACY TO DOSE    omeprazole  20 mg DAILY    senna-docusate  2 Tablet Q EVENING    amLODIPine  10 mg Q DAY    hydrALAZINE  50 mg 4X/DAY    lisinopril  40 mg Q DAY    tamsulosin  0.4 mg AFTER DINNER     PRN medications: Respiratory Therapy Consult, hydrALAZINE, senna-docusate **AND** polyethylene glycol/lytes, bisacodyl, magnesium hydroxide, acetaminophen, carboxymethylcellulose, benzocaine-menthol, mag hydrox-al hydrox-simeth, ondansetron **OR** ondansetron, traZODone, sodium chloride, ipratropium-albuterol, melatonin    Diet:  Current Diet Order   Procedures    Diet Order Diet: Regular       Medical Decision Making and Plan:  Left Basal Ganglia ICH secondary to hypertensive emergency  Right Hemiparesis  PT and OT for mobility and ADLs. Per guidelines, 15 hours per week between PT, OT and/or SLP.  Follow-up Neurology (Dr. Babb)  Secondary stroke ppx: Anti-hypertensives    Headache  Neck Pain  6/3 VSS. No other symptoms  6/4 Resolved. Tylenol relieved her symptoms. No change in neuro status.   6/5 Continues to have headache which returned last night severely. Recently started on DVT ppx. STAT CTH to ensure no expansion of bleed. CTH showing subacute ICH. D/w rads directly 6/5/2024 3:05 PM - not concerned for it being acute, looks older, but will get [facility] for formal comparison. Hold Lovenox for now to error on side of caution, recent US BLE neg for DVT.      Hypertension  Continue Hydralazine 50mg q6 hours --> 6/5 75mg every 6 hours  Continue Lisinopril 40mg daily   Continue Amlodipine 10mg daily   6/5 SBP elevated into 140's increase hydralazine to 75mg every 6 hours     Leukocytosis  Improving on admission but unclear cause  Continue Rocephin x 2 doses (through 5/31) - ok to d/c IV after  6/3 high normal in 10's. Historically has had WBC count fluctuating between 10-11     Urinary Retention  Has otero - required CIC   Continue Flomax 0.4mg nightly   5/30 Per patient preference keep otero another 1-2 days.  D/w her r/o CAUTI. Patient and dtr aware.   6/3 Discontinue IDFC   Voiding with low PVRs  Resolved    Chronic Cough  Secondary to years of smoking. D/w patient and daughter, nothing has changed in terms of her cough, nothing new or concerning to them.   Tessalon perls scheduled TID    Hyperphosphatemia  6/3 Resolved, Ph normal     Anemia  6/3 Stable at 10.9     Pain  Tylenol PRN    Neuropathic Pain  6/3 Continue Gabapentin - increase to 400mg TID     GI Ppx  Patient on Prilosec for GERD prophylaxis.      Bowel   Patient on Senna-docusate for constipation prophylaxis.         Upcoming Labs/imagin/6     DVT PROPHYLAXIS: None - Hemorrhagic stroke. Check US - negative. Per Neuro note, clear for DVT chemo-ppx once MRI completed (completed ). Lovenox 40 mg SQ started 6/3. Hold Lovenox 2024 until can formally compare CTH but per d/w radiologist directly low c/f acute bleed as appears older.      HOSPITALIST FOLLOWING: None     CODE STATUS: FULL CODE     DISPO: Home with minimal support     VIANCA: 6/15/24     MEDS SENT TO: Not [facility]     DISCHARGE SPECIALIST FOLLOW UP: Neurology     Patient to scheduled follow up with their PCP within 2 weeks from discharge from the [facility].     D/w daughter 2024 directly via telephone.

## 2024-06-05 NOTE — PROGRESS NOTES
NURSING DAILY NOTE    Name: Latanya Ferrera   Date of Admission: 5/29/2024   Admitting Diagnosis: Hemorrhagic stroke (HCC)  Attending Physician: Sofi Jules D.o.  Allergies: Patient has no known allergies.    Safety  Patient Assist     Patient Precautions  Fall Risk  Precaution Comments  R hemiplegia, R loss of sensation in hand, ICH  Bed Transfer Status  Standby Assist  Toilet Transfer Status   Contact Guard Assist  Assistive Devices  Rails, Wheelchair  Oxygen  None - Room Air  Diet/Therapeutic Dining  Current Diet Order   Procedures    Diet Order Diet: Regular     Pill Administration  whole  Agitated Behavioral Scale     ABS Level of Severity       Fall Risk  Has the patient had a fall this admission?   No  Carly Fisher Fall Risk Scoring  16, HIGH RISK  Fall Risk Safety Measures  bed alarm, chair alarm, poor balance, and low vision/ hearing    Vitals  Temperature: 36.4 °C (97.6 °F)  Temp src: Oral  Pulse: 89  Respiration: 18  Blood Pressure: 138/74  Blood Pressure MAP (Calculated): 95 MM HG  BP Location: Left, Upper Arm  Patient BP Position: Supine     Oxygen  Pulse Oximetry: 98 %  O2 (LPM): 0  O2 Delivery Device: None - Room Air    Bowel and Bladder  Last Bowel Movement  06/04/24  Stool Type  Type 2: Sausage shaped, but lumpy  Bowel Device  Bathroom  Continent  Bladder: Did not void   Bowel: No movement  Bladder Function  Urine Void (mL): 300 ml  Urinary Options: Yes  Urine Color: Unable To Evaluate  Genitourinary Assessment   Bladder Assessment (WDL):  Within Defined Limits  Ramirez Catheter: Present with Active Order  Ramirez Reasons per MD Order: Acute urinary retention or bladder outlet obstruction  Ramirez Care: Given with Soap and Water  Urinary Elimination: Catheter (Document on LDA)  Urine Color: Unable To Evaluate  Bladder Device: Bathroom  $ Bladder Scan Results (mL): 69  Bladder Medications: Yes    Skin  Xavier Score   17  Sensory Interventions    Bed Types: Standard/Trauma Mattress with Overlay  Skin Preventative Measures: Pillows in Use for Support / Positioning, Waffle Overlay  Moisture Interventions  Moisturizers/Barriers: Barrier Wipes  Containment Devices: Indwelling Catheter      Pain  Pain Rating Scale  5 - Interrupts some activities  Pain Location  Head  Pain Location Orientation  Posterior  Pain Interventions   Medication (see MAR) (Phone order from Dr. Olguin per Day shift RN)    ADLs    Bathing   Family / Significant Other  Linen Change   Partial  Personal Hygiene     Chlorhexidine Bath      Oral Care  Brushed Teeth  Teeth/Dentures  Missing Teeth (Comments)  Shave     Nutrition Percentage Eaten  Between 50-75% Consumed  Environmental Precautions     Patient Turns/Positioning  Patient Turns Self from Side to Side  Patient Turns Assistance/Tolerance  General Weakness  Bed Positions     Head of Bed Elevated         Psychosocial/Neurologic Assessment  Psychosocial Assessment  Psychosocial (WDL):  WDL Except  Patient Behaviors: Fatigue  Family Behaviors: No Family Present  Neurologic Assessment  Neuro (WDL): Exceptions to WDL  Level of Consciousness: Alert  Orientation Level: Oriented X4  Cognition: Follows commands, Appropriate attention/concentration  Speech: Clear  Pupil Assesment: Yes  R Pupil Size (mm): 3  R Pupil Shape / Description: Round  R Pupil Reaction: Brisk  L Pupil Size (mm): 3  L Pupil Shape / Description: Round  L Pupil Reaction: Brisk  Motor Function/Sensation Assessment: Sensation, Motor strength  RUE Sensation: Numbness  Muscle Strength Right Arm: Fair Strength against Gravity but No Resistance  LUE Sensation: Full sensation  Muscle Strength Left Arm: Normal Strength Against Gravity and Full Resistance  RLE Sensation: Numbness  Muscle Strength Right Leg: Fair Strength against Gravity but No Resistance  LLE Sensation: Full sensation  Muscle Strength Left Leg: Normal Strength Against Gravity and Full Resistance  EENT (WDL):  Within Defined  Limits    Cardio/Pulmonary Assessment  Edema   RLE Edema: Trace  LLE Edema: Trace  Respiratory Breath Sounds  RUL Breath Sounds: Clear  RML Breath Sounds: Clear  RLL Breath Sounds: Diminished  OZIEL Breath Sounds: Clear  LLL Breath Sounds: Diminished  Cardiac Assessment   Cardiac (WDL):  WDL Except (HTN)

## 2024-06-05 NOTE — PROGRESS NOTES
Patient states that she believes that she is having another stroke and is requesting to have a head CT done. Upon assessment deficits are at the same level as this morning. /70, HR 80. Scheduled hydralazine dose given.     Sent a message to on call Dr Darling Olguin via Vo[employer] (states  is offline) and left voicemail for call back.

## 2024-06-05 NOTE — THERAPY
Physical Therapy   Daily Treatment     Patient Name: Latanya Ferrera  Age:  58 y.o., Sex:  female  Medical Record #: [id number]  Today's Date: 6/5/2024     Precautions  Precautions: Fall Risk  Comments: R hemiplegia, R loss of sensation in hand, ICH    Subjective    Pt was seen 0930 and 1300. Agreeable to both sessions.     Objective       06/05/24 0931 06/05/24 1301   PT Charge Group   PT Gait Training (Units)  --  1   PT Therapeutic Exercise (Units) 1  --    PT Neuromuscular Re-Education / Balance (Units)  --  1   PT Therapeutic Activities (Units) 1  --    Supervising Physical Therapist Christina Fisher   PT Total Time Spent   PT Individual Total Time Spent (Mins) 30 30   Cognition    Level of Consciousness Alert Alert   Attention Impaired  --    Gait Functional Level of Assist    Gait Level Of Assist  --  Moderate Assist   Assistive Device  --  Front Wheel Walker  (PTA provided RUE  support, RAFO)   Distance (Feet)  --  25   # of Times Distance was Traveled  --  2   Deviation  --  Step To;Decreased Base Of Support;Decreased Toe Off;Decreased Heel Strike;Shuffled Gait;Ataxic   Wheelchair Functional Level of Assist   Wheelchair Assist Stand by Assist  --    Distance Wheelchair (Feet or Distance) 150  --    Wheelchair Description Extra time;Supervision for safety  --    Transfer Functional Level of Assist   Bed, Chair, Wheelchair Transfer Contact Guard Assist Contact Guard Assist   Bed Chair Wheelchair Transfer Description Initial preparation for task;Set-up of equipment;Supervision for safety;Verbal cueing Initial preparation for task;Set-up of equipment;Supervision for safety;Verbal cueing  (stand reach pivot)   Supine Lower Body Exercise   Other Exercises BLE/single LE leg press on shuttle, BLE 4/5 bands 2x10, RLE 3/4 bands 2x10, LLE 4/5 bands 2x10 for strengthening and R knee control.  --    Bed Mobility    Supine to Sit Minimal Assist  (on shuttle) Standby Assist   Sit to Supine Contact Guard Assist  (on  "shuttle)  --    Sit to Stand  --  Contact Guard Assist   Scooting Standby Assist Standby Assist   Interdisciplinary Plan of Care Collaboration   Patient Position at End of Therapy In Bed;Call Light within Reach;Tray Table within Reach;Phone within Reach Seated;Call Light within Reach;Tray Table within Reach;Phone within Reach     PM-  totalA donning/domarening ROMEL during second session.  Lateral stepping w/ BUE support on hallway rail, cues for RLE placement and stance. 50 ft total w/ modA    Assessment    Pt tolerated both sessions well, RLE remains swollen and required ace wrapping RAFO+ slippers for posterior support. Good bilateral knee control during leg press. Recommend family to bring in shoes that are more supportive.      Strengths: Able to follow instructions, Alert and oriented, Independent prior level of function, Motivated for self care and independence, Pleasant and cooperative, Supportive family, Willingly participates in therapeutic activities  Barriers: Decreased endurance, Difficulty following instructions, Fatigue, Hemiplegia, Home accessibility, Hypertension, Impaired balance, Impaired carryover of learning, Limited mobility (lives alone, decreased knowledge of condition, limited daytime family support)    Plan    Ambulation w/ FWW v.s jaciel walker + black anterior shell AFO  NMRE RLE  RLE Strengthening and endurance  Mat program  Standing balance  Shuttle for knee control  Aquatic therapy when appropriate    DME  PT DME Recommendations  Wheelchair: 18\" Width, Jaciel Height (16\"-17\"), Lightweight, Removable/Flip Back Armrests, Standard Leg Rests, Anti-tippers  Cushion: Standard  Assistive Device: Front Wheeled Walker, Other (Comments) (TBD)  Additional Equipment: Other (Comments) (TBD)    Passport items to be completed:  Get in/out of bed safely, in/out of a vehicle, safely use mobility device, walk or wheel around home/community, navigate up and down stairs, show how to get up/down from the ground, " ensure home is accessible, demonstrate HEP, complete caregiver training    Physical Therapy Problems (Active)       Problem: Balance       Dates: Start:  05/30/24         Goal: STG-Within one week, patient will maintain static standing c/ 1UE support at SBA or better, >/= 2 minutes.        Dates: Start:  05/30/24               Problem: Mobility       Dates: Start:  05/30/24         Goal: STG-Within one week, patient will ambulate household distances >/= 50' c/ LRAD and TotalA or better.        Dates: Start:  05/30/24               Problem: Mobility Transfers       Dates: Start:  05/30/24         Goal: STG-Within one week, patient will transfer bed to chair c/ Sonia or better c/ appropriate compensatory method (reach pivot vs SPT).       Dates: Start:  05/30/24               Problem: PT-Long Term Goals       Dates: Start:  05/30/24         Goal: LTG-By discharge, patient will ambulate >/= 300' c/ LRAD and CGA or better.        Dates: Start:  05/30/24            Goal: LTG-By discharge, patient will transfer one surface to another c/ Boni.        Dates: Start:  05/30/24            Goal: LTG-By discharge, patient will ambulate up/down 2 stairs c/ CGA or better to access home.        Dates: Start:  05/30/24            Goal: LTG-By discharge, patient will transfer in/out of a car c/ CGA or better.        Dates: Start:  05/30/24

## 2024-06-05 NOTE — THERAPY
Occupational Therapy  Daily Treatment     Patient Name: Latanya Ferrera  Age:  58 y.o., Sex:  female  Medical Record #: [id number]  Today's Date: 6/5/2024     Precautions  Precautions: (P) Fall Risk  Comments: R hemiplegia, R loss of sensation in hand, ICH         Subjective    Patient was on her phone upon OT arrival and asked for a few minutes prior to starting OT.       Objective       06/05/24 0831   OT Charge Group   OT Neuromuscular Re-education / Balance (Units) 3   OT Therapy Activity (Units) 1   OT Total Time Spent   OT Individual Total Time Spent (Mins) 60   Precautions   Precautions Fall Risk   Functional Level of Assist   Bed, Chair, Wheelchair Transfer Contact Guard Assist   Bed Chair Wheelchair Transfer Description Squat pivot transfer to wheelchair;Supervision for safety;Set-up of equipment;Verbal cueing  (bed to w/c)   Fine Motor / Dexterity    Fine Motor / Dexterity Yes   Fine Motor / Dexterity Interventions R UE gross and FMC task of removing 15 large pegs from large, slanted pegboard and then releasing them into a bucket.   Bed Mobility    Supine to Sit Standby Assist   Scooting Standby Assist   Interdisciplinary Plan of Care Collaboration   Patient Position at End of Therapy Seated;Chair Alarm On;Self Releasing Lap Belt Applied  (in gym awaiting PT)     With R UE placed in [place] at tension assist of 3.0, patient worked on active reaching within all planes and directions while reaching for beanbags, then releasing into a bucket on her left side.  Focus was on shoulder flexion, wrist extension and grasp and release with increased incorporation of thumb for grasp.  Completed 50 times.    B UE integration/coordination task of tossing/catching a medium sized ball while seated in w/c.    Assessment    Patient is starting to incorporate all R UE movements together to complete unilateral and bilateral tasks.  She needs continued frequent repetitions of coordinating all joints together.    Strengths: Able to  follow instructions, Good insight into deficits/needs, Independent prior level of function, Manages pain appropriately, Motivated for self care and independence, Pleasant and cooperative, Supportive family, Willingly participates in therapeutic activities  Barriers: Decreased endurance, Hemiparesis, Home accessibility, Impaired activity tolerance, Impaired balance (Impaired coordination/motor control)    Plan    ADLs, safety with transfers, neuro re-ed R UE, standing tolerance/balance,  3x/week with therapy techs for adjunct therapy     DME  OT DME Recommendations  Bathroom Equipment: Tub Transfer Bench (Medicaid Only)  Additional Equipment: Other (Comments) (TBD)      Occupational Therapy Goals (Active)       Problem: Bathing       Dates: Start:  05/30/24         Goal: STG-Within one week, patient will bathe with MIN A and LRAD       Dates: Start:  05/30/24         Goal Note filed on 06/03/24 0946 by Fox Castro, OT/L       Not assessed, patient has declined bathing at rehab                 Problem: Dressing       Dates: Start:  06/03/24         Goal: STG-Within one week, patient will dress LB with setup and SBA and commode over toilet       Dates: Start:  06/03/24               Problem: Functional Transfers       Dates: Start:  06/03/24         Goal: STG-Within one week, patient will transfer to toilet with SBA using grab bar and commode over toilet       Dates: Start:  06/03/24               Problem: OT Long Term Goals       Dates: Start:  05/30/24         Goal: LTG-By discharge, patient will complete basic self care tasks with SPV-MOD I and LRAD       Dates: Start:  05/30/24            Goal: LTG-By discharge, patient will perform bathroom transfers with SPV-MOD I and LRAD       Dates: Start:  05/30/24

## 2024-06-05 NOTE — PROGRESS NOTES
NURSING DAILY NOTE    Name: Latanya Ferrera   Date of Admission: 5/29/2024   Admitting Diagnosis: Hemorrhagic stroke (HCC)  Attending Physician: Sofi Jules D.o.  Allergies: Patient has no known allergies.    Safety  Patient Assist     Patient Precautions  Fall Risk  Precaution Comments  R hemiplegia, R loss of sensation in hand, ICH  Bed Transfer Status  Standby Assist  Toilet Transfer Status   Contact Guard Assist  Assistive Devices  Rails, Wheelchair  Oxygen  None - Room Air  Diet/Therapeutic Dining  Current Diet Order   Procedures    Diet Order Diet: Regular     Pill Administration  whole  Agitated Behavioral Scale     ABS Level of Severity       Fall Risk  Has the patient had a fall this admission?   No  Carly Fisher Fall Risk Scoring  16, HIGH RISK  Fall Risk Safety Measures  bed alarm, chair alarm, poor balance, and low vision/ hearing    Vitals  Temperature: 36.4 °C (97.6 °F)  Temp src: Oral  Pulse: 82  Respiration: 18  Blood Pressure: 138/70  Blood Pressure MAP (Calculated): 93 MM HG  BP Location: Left, Upper Arm  Patient BP Position: Supine     Oxygen  Pulse Oximetry: 98 %  O2 (LPM): 0  O2 Delivery Device: None - Room Air    Bowel and Bladder  Last Bowel Movement  06/04/24  Stool Type  Type 2: Sausage shaped, but lumpy  Bowel Device     Continent  Bladder: Did not void   Bowel: No movement  Bladder Function  Urine Void (mL): 300 ml  Urinary Options: Yes  Urine Color: Unable To Evaluate  Genitourinary Assessment   Bladder Assessment (WDL):  Within Defined Limits  Ramirez Catheter: Present with Active Order  Ramirez Reasons per MD Order: Acute urinary retention or bladder outlet obstruction  Ramirez Care: Given with Soap and Water  Urinary Elimination: Catheter (Document on LDA)  Urine Color: Unable To Evaluate  Bladder Device: Indwelling Catheter  $ Bladder Scan Results (mL): 69  Bladder Medications: Yes    Skin  Xavier Score   17  Sensory  Interventions   Bed Types: Standard/Trauma Mattress  Skin Preventative Measures: Waffle Overlay, Pillows in Use for Support / Positioning  Moisture Interventions  Moisturizers/Barriers: Barrier Wipes  Containment Devices: Indwelling Catheter      Pain  Pain Rating Scale  0 - No Pain  Pain Location  Head, Neck, Teeth  Pain Location Orientation  Posterior  Pain Interventions   Medication (see MAR)    ADLs    Bathing   Family / Significant Other  Linen Change   Partial  Personal Hygiene     Chlorhexidine Bath      Oral Care  Brushed Teeth  Teeth/Dentures     Shave     Nutrition Percentage Eaten  Between 50-75% Consumed  Environmental Precautions     Patient Turns/Positioning  Patient Turns Self from Side to Side  Patient Turns Assistance/Tolerance  General Weakness  Bed Positions     Head of Bed Elevated         Psychosocial/Neurologic Assessment  Psychosocial Assessment  Psychosocial (WDL):  WDL Except  Patient Behaviors: Fatigue  Family Behaviors: No Family Present  Neurologic Assessment  Neuro (WDL): Exceptions to WDL  Level of Consciousness: Alert  Orientation Level: Oriented X4  Cognition: Follows commands, Appropriate attention/concentration  Speech: Clear  Pupil Assesment: No  Motor Function/Sensation Assessment: Sensation, Motor strength  RUE Sensation: Numbness  Muscle Strength Right Arm: Fair Strength against Gravity but No Resistance  LUE Sensation: Full sensation  Muscle Strength Left Arm: Normal Strength Against Gravity and Full Resistance  RLE Sensation: Numbness  Muscle Strength Right Leg: Fair Strength against Gravity but No Resistance  LLE Sensation: Full sensation  Muscle Strength Left Leg: Normal Strength Against Gravity and Full Resistance  EENT (WDL):  Within Defined Limits    Cardio/Pulmonary Assessment  Edema   RLE Edema: Trace  LLE Edema: Trace  Respiratory Breath Sounds  RUL Breath Sounds: Clear  RML Breath Sounds: Clear  RLL Breath Sounds: Diminished  OZIEL Breath Sounds: Clear  LLL Breath  Sounds: Diminished  Cardiac Assessment   Cardiac (WDL):  WDL Except

## 2024-06-05 NOTE — PROGRESS NOTES
Per Dr Darling Olguin, give one time dose Fioricet for headache. Monitor for neuro changes and vitals.

## 2024-06-05 NOTE — THERAPY
Speech Language Pathology  Daily Treatment     Patient Name: Latanya Ferrera  Age:  58 y.o., Sex:  female  Medical Record #: [id number]  Today's Date: 6/5/2024     Precautions  Precautions: Fall Risk  Comments: R hemiplegia, R loss of sensation in hand, ICH    Subjective    Pt discussing POC with physician and stat imaging ordered. Agreeable to session.      Objective       06/05/24 1034   Treatment Charges   SLP Cognitive Skill Development First 15 Minutes 1   SLP Cognitive Skill Development Additional 15 Minutes 3   SLP Total Time Spent   SLP Individual Total Time Spent (Mins) 60   Cognition - Detailed   Moderate Attention Supervision (5)   Interdisciplinary Plan of Care Collaboration   IDT Collaboration with  Physician   Patient Position at End of Therapy In Bed;Phone within Reach;Tray Table within Reach;Call Light within Reach   Collaboration Comments CLOF - stat imaging         Assessment    Alternating attention tasks x3. 1st trial: 40% IND, MAX cues 100%, 2nd trial: 70% IND, MOD cues to achieve 100%  3rd trial: 100% IND.     Strengths: Motivated for self care and independence, Pleasant and cooperative, Willingly participates in therapeutic activities, Supportive family, Making steady progress towards goals, Alert and oriented, Effective communication skills  Barriers: Hemiparesis, Emotional lability, Impaired functional cognition    Plan    Continue to target memory, attention, fxl problem solving and d/c planning    Passport items to be completed:  manage finances, manage medications, arrive to therapy appointments on time, complete daily memory log entries, solve problems related to safety situations, review education related to hospitalization, complete caregiver training     Speech Therapy Problems (Active)       Problem: Memory STGs       Dates: Start:  05/30/24         Goal: STG-Within one week, patient will demonstrate use of memory strategies in order to recall information from therapies after a 2/hr  delay.        Dates: Start:  05/30/24         Goal Note filed on 06/03/24 1300 by Mya Steven MS,CCC-SLP       Will continue to target.                  Problem: Problem Solving STGs       Dates: Start:  05/30/24         Goal: STG-Within one week, patient will complete alternating attention tasks with 80% accuracy given SPV       Dates: Start:  05/30/24         Goal Note filed on 06/03/24 1300 by Mya Steven MS,CCC-SLP       Will continue to target.                  Problem: Speech/Swallowing LTGs       Dates: Start:  05/30/24         Goal: LTG-By discharge, patient will solve complex problems related to IADL's in order to d/c home with mod I        Dates: Start:  05/30/24

## 2024-06-05 NOTE — DISCHARGE PLANNING
KAVIN spoke with Shana at [facility].  Patient approved to 6/11/24.  This CM will reach out to Shana again next week to request an additional week if needed.  CM will continue to monitor for DC needs.

## 2024-06-06 ENCOUNTER — APPOINTMENT (OUTPATIENT)
Dept: SPEECH THERAPY | Facility: REHABILITATION | Age: 59
DRG: 057 | End: 2024-06-06
Attending: PHYSICAL MEDICINE & REHABILITATION
Payer: MEDICAID

## 2024-06-06 ENCOUNTER — APPOINTMENT (OUTPATIENT)
Dept: OCCUPATIONAL THERAPY | Facility: REHABILITATION | Age: 59
DRG: 057 | End: 2024-06-06
Attending: PHYSICAL MEDICINE & REHABILITATION
Payer: MEDICAID

## 2024-06-06 ENCOUNTER — APPOINTMENT (OUTPATIENT)
Dept: PHYSICAL THERAPY | Facility: REHABILITATION | Age: 59
DRG: 057 | End: 2024-06-06
Attending: PHYSICAL MEDICINE & REHABILITATION
Payer: MEDICAID

## 2024-06-06 LAB
ALBUMIN SERPL BCP-MCNC: 3.3 G/DL (ref 3.2–4.9)
ALBUMIN/GLOB SERPL: 1.1 G/DL
ALP SERPL-CCNC: 93 U/L (ref 30–99)
ALT SERPL-CCNC: 33 U/L (ref 2–50)
ANION GAP SERPL CALC-SCNC: 13 MMOL/L (ref 7–16)
AST SERPL-CCNC: 20 U/L (ref 12–45)
BASOPHILS # BLD AUTO: 0.4 % (ref 0–1.8)
BASOPHILS # BLD: 0.04 K/UL (ref 0–0.12)
BILIRUB SERPL-MCNC: 0.2 MG/DL (ref 0.1–1.5)
BUN SERPL-MCNC: 17 MG/DL (ref 8–22)
CALCIUM ALBUM COR SERPL-MCNC: 9.7 MG/DL (ref 8.5–10.5)
CALCIUM SERPL-MCNC: 9.1 MG/DL (ref 8.5–10.5)
CHLORIDE SERPL-SCNC: 108 MMOL/L (ref 96–112)
CO2 SERPL-SCNC: 19 MMOL/L (ref 20–33)
CREAT SERPL-MCNC: 0.65 MG/DL (ref 0.5–1.4)
EOSINOPHIL # BLD AUTO: 0.33 K/UL (ref 0–0.51)
EOSINOPHIL NFR BLD: 3.1 % (ref 0–6.9)
ERYTHROCYTE [DISTWIDTH] IN BLOOD BY AUTOMATED COUNT: 43.2 FL (ref 35.9–50)
GFR SERPLBLD CREATININE-BSD FMLA CKD-EPI: 103 ML/MIN/1.73 M 2
GLOBULIN SER CALC-MCNC: 3 G/DL (ref 1.9–3.5)
GLUCOSE SERPL-MCNC: 109 MG/DL (ref 65–99)
HCT VFR BLD AUTO: 33.4 % (ref 37–47)
HGB BLD-MCNC: 10.8 G/DL (ref 12–16)
IMM GRANULOCYTES # BLD AUTO: 0.1 K/UL (ref 0–0.11)
IMM GRANULOCYTES NFR BLD AUTO: 0.9 % (ref 0–0.9)
LYMPHOCYTES # BLD AUTO: 1.82 K/UL (ref 1–4.8)
LYMPHOCYTES NFR BLD: 16.9 % (ref 22–41)
MCH RBC QN AUTO: 29.8 PG (ref 27–33)
MCHC RBC AUTO-ENTMCNC: 32.3 G/DL (ref 32.2–35.5)
MCV RBC AUTO: 92.3 FL (ref 81.4–97.8)
MONOCYTES # BLD AUTO: 0.75 K/UL (ref 0–0.85)
MONOCYTES NFR BLD AUTO: 7 % (ref 0–13.4)
NEUTROPHILS # BLD AUTO: 7.71 K/UL (ref 1.82–7.42)
NEUTROPHILS NFR BLD: 71.7 % (ref 44–72)
NRBC # BLD AUTO: 0 K/UL
NRBC BLD-RTO: 0 /100 WBC (ref 0–0.2)
PLATELET # BLD AUTO: 285 K/UL (ref 164–446)
PMV BLD AUTO: 10.8 FL (ref 9–12.9)
POTASSIUM SERPL-SCNC: 4.1 MMOL/L (ref 3.6–5.5)
PROT SERPL-MCNC: 6.3 G/DL (ref 6–8.2)
RBC # BLD AUTO: 3.62 M/UL (ref 4.2–5.4)
SODIUM SERPL-SCNC: 140 MMOL/L (ref 135–145)
WBC # BLD AUTO: 10.8 K/UL (ref 4.8–10.8)

## 2024-06-06 PROCEDURE — 97112 NEUROMUSCULAR REEDUCATION: CPT | Mod: CQ

## 2024-06-06 PROCEDURE — 36415 COLL VENOUS BLD VENIPUNCTURE: CPT

## 2024-06-06 PROCEDURE — 97116 GAIT TRAINING THERAPY: CPT | Mod: CQ

## 2024-06-06 PROCEDURE — 97130 THER IVNTJ EA ADDL 15 MIN: CPT

## 2024-06-06 PROCEDURE — 97530 THERAPEUTIC ACTIVITIES: CPT | Mod: CQ

## 2024-06-06 PROCEDURE — 99232 SBSQ HOSP IP/OBS MODERATE 35: CPT | Performed by: PHYSICAL MEDICINE & REHABILITATION

## 2024-06-06 PROCEDURE — 700102 HCHG RX REV CODE 250 W/ 637 OVERRIDE(OP): Performed by: PHYSICAL MEDICINE & REHABILITATION

## 2024-06-06 PROCEDURE — 97129 THER IVNTJ 1ST 15 MIN: CPT

## 2024-06-06 PROCEDURE — 97110 THERAPEUTIC EXERCISES: CPT | Mod: CO

## 2024-06-06 PROCEDURE — 85025 COMPLETE CBC W/AUTO DIFF WBC: CPT

## 2024-06-06 PROCEDURE — 80053 COMPREHEN METABOLIC PANEL: CPT

## 2024-06-06 PROCEDURE — A9270 NON-COVERED ITEM OR SERVICE: HCPCS | Performed by: PHYSICAL MEDICINE & REHABILITATION

## 2024-06-06 PROCEDURE — 97112 NEUROMUSCULAR REEDUCATION: CPT | Mod: CO

## 2024-06-06 PROCEDURE — 770010 HCHG ROOM/CARE - REHAB SEMI PRIVAT*

## 2024-06-06 RX ORDER — OXYCODONE HYDROCHLORIDE 5 MG/1
2.5 TABLET ORAL EVERY 4 HOURS PRN
Status: DISCONTINUED | OUTPATIENT
Start: 2024-06-06 | End: 2024-06-20 | Stop reason: HOSPADM

## 2024-06-06 RX ADMIN — GABAPENTIN 400 MG: 400 CAPSULE ORAL at 19:49

## 2024-06-06 RX ADMIN — HYDRALAZINE HYDROCHLORIDE 75 MG: 50 TABLET ORAL at 12:38

## 2024-06-06 RX ADMIN — HYDRALAZINE HYDROCHLORIDE 75 MG: 50 TABLET ORAL at 19:49

## 2024-06-06 RX ADMIN — AMLODIPINE BESYLATE 10 MG: 5 TABLET ORAL at 05:52

## 2024-06-06 RX ADMIN — TRAZODONE HYDROCHLORIDE 50 MG: 50 TABLET ORAL at 21:32

## 2024-06-06 RX ADMIN — GABAPENTIN 400 MG: 400 CAPSULE ORAL at 08:40

## 2024-06-06 RX ADMIN — ACETAMINOPHEN 500 MG: 500 TABLET, FILM COATED ORAL at 14:24

## 2024-06-06 RX ADMIN — OXYCODONE 2.5 MG: 5 TABLET ORAL at 19:50

## 2024-06-06 RX ADMIN — BENZONATATE 100 MG: 100 CAPSULE ORAL at 08:40

## 2024-06-06 RX ADMIN — HYDRALAZINE HYDROCHLORIDE 75 MG: 50 TABLET ORAL at 08:41

## 2024-06-06 RX ADMIN — BENZONATATE 100 MG: 100 CAPSULE ORAL at 14:25

## 2024-06-06 RX ADMIN — ACETAMINOPHEN 500 MG: 500 TABLET, FILM COATED ORAL at 21:32

## 2024-06-06 RX ADMIN — OMEPRAZOLE 20 MG: 20 CAPSULE, DELAYED RELEASE ORAL at 08:41

## 2024-06-06 RX ADMIN — GABAPENTIN 400 MG: 400 CAPSULE ORAL at 14:25

## 2024-06-06 RX ADMIN — HYDRALAZINE HYDROCHLORIDE 75 MG: 50 TABLET ORAL at 17:24

## 2024-06-06 RX ADMIN — LISINOPRIL 40 MG: 20 TABLET ORAL at 05:52

## 2024-06-06 RX ADMIN — TAMSULOSIN HYDROCHLORIDE 0.4 MG: 0.4 CAPSULE ORAL at 17:24

## 2024-06-06 RX ADMIN — BENZONATATE 100 MG: 100 CAPSULE ORAL at 19:49

## 2024-06-06 RX ADMIN — Medication 5000 UNITS: at 08:41

## 2024-06-06 ASSESSMENT — GAIT ASSESSMENTS
ASSISTIVE DEVICE: FRONT WHEEL WALKER
GAIT LEVEL OF ASSIST: MODERATE ASSIST

## 2024-06-06 ASSESSMENT — PAIN DESCRIPTION - PAIN TYPE: TYPE: ACUTE PAIN

## 2024-06-06 ASSESSMENT — ACTIVITIES OF DAILY LIVING (ADL)
BED_CHAIR_WHEELCHAIR_TRANSFER_DESCRIPTION: INCREASED TIME;SET-UP OF EQUIPMENT;SQUAT PIVOT TRANSFER TO WHEELCHAIR
BED_CHAIR_WHEELCHAIR_TRANSFER_DESCRIPTION: INITIAL PREPARATION FOR TASK;SET-UP OF EQUIPMENT;SUPERVISION FOR SAFETY

## 2024-06-06 NOTE — PROGRESS NOTES
NURSING DAILY NOTE    Name: Latanya Ferrera   Date of Admission: 5/29/2024   Admitting Diagnosis: Hemorrhagic stroke (HCC)  Attending Physician: Sofi Jules D.o.  Allergies: Patient has no known allergies.    Safety  Patient Assist  Mod Assist  Patient Precautions  Fall Risk  Precaution Comments  R hemiplegia, R loss of sensation in hand, ICH  Bed Transfer Status  Contact Guard Assist  Toilet Transfer Status   Contact Guard Assist  Assistive Devices  Rails, Wheelchair  Oxygen  None - Room Air  Diet/Therapeutic Dining  Current Diet Order   Procedures    Diet Order Diet: Regular     Pill Administration  whole  Agitated Behavioral Scale     ABS Level of Severity       Fall Risk  Has the patient had a fall this admission?   No  Carly Fisher Fall Risk Scoring  16, HIGH RISK  Fall Risk Safety Measures  bed alarm, chair alarm, poor balance, and low vision/ hearing    Vitals  Temperature: 36.4 °C (97.6 °F)  Temp src: Oral  Pulse: 84  Respiration: 20  Blood Pressure: 134/69  Blood Pressure MAP (Calculated): 91 MM HG  BP Location: Right, Upper Arm  Patient BP Position: Supine     Oxygen  Pulse Oximetry: 99 %  O2 (LPM): 0  O2 Delivery Device: None - Room Air    Bowel and Bladder  Last Bowel Movement  06/04/24  Stool Type  Type 2: Sausage shaped, but lumpy  Bowel Device  Bathroom  Continent  Bladder: Did not void   Bowel: No movement  Bladder Function  Urine Void (mL):  (Large)  Urinary Options: Yes  Urine Color: Unable To Evaluate  Genitourinary Assessment   Bladder Assessment (WDL):  Within Defined Limits  Ramirez Catheter: Not Applicable  Ramirez Reasons per MD Order: Acute urinary retention or bladder outlet obstruction  Ramirez Care: Given with Soap and Water  Urinary Elimination: Catheter (Document on LDA)  Urine Color: Unable To Evaluate  Bladder Device: Bathroom  Bladder Scan: Post Void  $ Bladder Scan Results (mL): 44  Bladder Medications: Yes    Skin  Xavier  Score   17  Sensory Interventions   Bed Types: Standard/Trauma Mattress  Skin Preventative Measures: Pillows in Use for Support / Positioning  Moisture Interventions  Moisturizers/Barriers: Barrier Wipes  Containment Devices: Indwelling Catheter      Pain  Pain Rating Scale  3 - Sometimes distracts me  Pain Location  Leg  Pain Location Orientation  Right  Pain Interventions   Declines    ADLs    Bathing   Family / Significant Other  Linen Change   Partial  Personal Hygiene     Chlorhexidine Bath      Oral Care  Brushed Teeth  Teeth/Dentures  Missing Teeth (Comments)  Shave     Nutrition Percentage Eaten  Between 50-75% Consumed  Environmental Precautions  Treaded Slipper Socks on Patient, Bed in Low Position  Patient Turns/Positioning  Patient Turns Self from Side to Side  Patient Turns Assistance/Tolerance  Assistance of One  Bed Positions  Bed Controls On, Bed Locked  Head of Bed Elevated  Self regulated      Psychosocial/Neurologic Assessment  Psychosocial Assessment  Psychosocial (WDL):  WDL Except  Patient Behaviors: Fatigue  Family Behaviors: No Family Present  Neurologic Assessment  Neuro (WDL): Exceptions to WDL  Level of Consciousness: Alert  Orientation Level: Oriented X4  Cognition: Follows commands, Appropriate attention/concentration  Speech: Clear  Pupil Assesment: No  R Pupil Size (mm): 3  R Pupil Shape / Description: Round  R Pupil Reaction: Brisk  L Pupil Size (mm): 3  L Pupil Shape / Description: Round  L Pupil Reaction: Brisk  Motor Function/Sensation Assessment: Sensation, Motor strength  RUE Sensation: Numbness  Muscle Strength Right Arm: Fair Strength against Gravity but No Resistance  LUE Sensation: Full sensation  Muscle Strength Left Arm: Normal Strength Against Gravity and Full Resistance  RLE Sensation: Numbness  Muscle Strength Right Leg: Fair Strength against Gravity but No Resistance  LLE Sensation: Full sensation  Muscle Strength Left Leg: Normal Strength Against Gravity and Full  Resistance  EENT (WDL):  Within Defined Limits    Cardio/Pulmonary Assessment  Edema   RLE Edema: Trace  LLE Edema: Trace  Respiratory Breath Sounds  RUL Breath Sounds: Clear  RML Breath Sounds: Clear  RLL Breath Sounds: Diminished  OZIEL Breath Sounds: Clear  LLL Breath Sounds: Diminished  Cardiac Assessment   Cardiac (WDL):  WDL Except

## 2024-06-06 NOTE — PROGRESS NOTES
NURSING DAILY NOTE    Name: Latanya Ferrera   Date of Admission: 5/29/2024   Admitting Diagnosis: Hemorrhagic stroke (HCC)  Attending Physician: Sofi Jules D.o.  Allergies: Patient has no known allergies.    Safety  Patient Assist  Mod Assist  Patient Precautions  Fall Risk  Precaution Comments  R hemiplegia, R loss of sensation in hand, ICH  Bed Transfer Status  Contact Guard Assist  Toilet Transfer Status   Contact Guard Assist  Assistive Devices  Rails, Wheelchair  Oxygen  None - Room Air  Diet/Therapeutic Dining  Current Diet Order   Procedures    Diet Order Diet: Regular     Pill Administration  whole  Agitated Behavioral Scale     ABS Level of Severity       Fall Risk  Has the patient had a fall this admission?   No  Carly Fisher Fall Risk Scoring  16, HIGH RISK  Fall Risk Safety Measures  bed alarm, chair alarm, poor balance, and low vision/ hearing    Vitals  Temperature: 36.4 °C (97.6 °F)  Temp src: Oral  Pulse: 81  Respiration: 20  Blood Pressure: (!) 151/75  Blood Pressure MAP (Calculated): 100 MM HG  BP Location: Right, Upper Arm  Patient BP Position: Supine     Oxygen  Pulse Oximetry: 99 %  O2 (LPM): 0  O2 Delivery Device: None - Room Air    Bowel and Bladder  Last Bowel Movement  06/04/24  Stool Type  Type 2: Sausage shaped, but lumpy  Bowel Device  Bathroom  Continent  Bladder: Did not void   Bowel: No movement  Bladder Function  Urine Void (mL):  (Large)  Urinary Options: Yes  Urine Color: Unable To Evaluate  Genitourinary Assessment   Bladder Assessment (WDL):  Within Defined Limits  Ramirez Catheter: Present with Active Order  Ramirez Reasons per MD Order: Acute urinary retention or bladder outlet obstruction  Ramirez Care: Given with Soap and Water  Urinary Elimination: Catheter (Document on LDA)  Urine Color: Unable To Evaluate  Bladder Device: Bathroom  Bladder Scan: Post Void  $ Bladder Scan Results (mL): 44  Bladder Medications:  Yes    Skin  Xavier Score   17  Sensory Interventions   Bed Types: Standard/Trauma Mattress  Skin Preventative Measures: Pillows in Use for Support / Positioning  Moisture Interventions  Moisturizers/Barriers: Barrier Wipes  Containment Devices: Indwelling Catheter      Pain  Pain Rating Scale  6 - Hard to ignore, avoid usual activities  Pain Location  Head  Pain Location Orientation  Posterior  Pain Interventions   Medication (see MAR), Repositioned, Rest    ADLs    Bathing   Family / Significant Other  Linen Change   Partial  Personal Hygiene     Chlorhexidine Bath      Oral Care  Brushed Teeth  Teeth/Dentures  Missing Teeth (Comments)  Shave     Nutrition Percentage Eaten  Between 50-75% Consumed  Environmental Precautions  Treaded Slipper Socks on Patient, Bed in Low Position  Patient Turns/Positioning  Patient Turns Self from Side to Side  Patient Turns Assistance/Tolerance  Assistance of One  Bed Positions  Bed Controls On, Bed Locked  Head of Bed Elevated  Self regulated      Psychosocial/Neurologic Assessment  Psychosocial Assessment  Psychosocial (WDL):  WDL Except  Patient Behaviors: Fatigue  Family Behaviors: No Family Present  Neurologic Assessment  Neuro (WDL): Exceptions to WDL  Level of Consciousness: Alert  Orientation Level: Oriented X4  Cognition: Follows commands, Appropriate attention/concentration  Speech: Clear  Pupil Assesment: No  R Pupil Size (mm): 3  R Pupil Shape / Description: Round  R Pupil Reaction: Brisk  L Pupil Size (mm): 3  L Pupil Shape / Description: Round  L Pupil Reaction: Brisk  Motor Function/Sensation Assessment: Sensation, Motor strength  RUE Sensation: Numbness  Muscle Strength Right Arm: Fair Strength against Gravity but No Resistance  LUE Sensation: Full sensation  Muscle Strength Left Arm: Normal Strength Against Gravity and Full Resistance  RLE Sensation: Numbness  Muscle Strength Right Leg: Fair Strength against Gravity but No Resistance  LLE Sensation: Full  sensation  Muscle Strength Left Leg: Normal Strength Against Gravity and Full Resistance  EENT (WDL):  Within Defined Limits    Cardio/Pulmonary Assessment  Edema   RLE Edema: Trace  LLE Edema: Trace  Respiratory Breath Sounds  RUL Breath Sounds: Clear  RML Breath Sounds: Clear  RLL Breath Sounds: Diminished  OZIEL Breath Sounds: Clear  LLL Breath Sounds: Diminished  Cardiac Assessment   Cardiac (WDL):  WDL Except

## 2024-06-06 NOTE — PROGRESS NOTES
"  Physical Medicine & Rehabilitation Progress Note    Encounter Date: 6/6/2024    Chief Complaint: Needs rent assistance    Interval Events (Subjective):  VSS - still some elevated readings in 140-150  BM 6/6 (documented as bloody - patient denies)  Voiding with low PVRs     Seen and examined in the hallway. She is cold. Asking if there are resources to help assist with rent. She did not have bloody BM today. She states the staff didn't see it she flushed before they could see it but it wasn't bloody. She is otherwise feeling well.       ROS: 14 point ROS negative unless otherwise specified in the HPI    Objective:  VITAL SIGNS: BP (!) 142/84   Pulse 77   Temp 36.1 °C (96.9 °F) (Temporal)   Resp 16   Ht 1.676 m (5' 6\")   Wt 81.6 kg (180 lb)   SpO2 97%   BMI 29.05 kg/m²     GEN: No apparent distress  HEENT: Head normocephalic, atraumatic.  Sclera nonicteric bilaterally, no ocular discharge appreciated bilaterally.  CV: Extremities warm and well-perfused, no peripheral edema appreciated bilaterally.  PULMONARY: Breathing nonlabored on room air, no respiratory accessory muscle use.  Not requiring supplemental oxygen.  SKIN: No appreciable skin breakdown on exposed areas of skin.  PSYCH: Mood and affect within normal limits.  NEURO: Awake alert.  Conversational.  Logical thought content.     Laboratory Values:  Recent Results (from the past 72 hour(s))   CBC WITH DIFFERENTIAL    Collection Time: 06/06/24  5:28 AM   Result Value Ref Range    WBC 10.8 4.8 - 10.8 K/uL    RBC 3.62 (L) 4.20 - 5.40 M/uL    Hemoglobin 10.8 (L) 12.0 - 16.0 g/dL    Hematocrit 33.4 (L) 37.0 - 47.0 %    MCV 92.3 81.4 - 97.8 fL    MCH 29.8 27.0 - 33.0 pg    MCHC 32.3 32.2 - 35.5 g/dL    RDW 43.2 35.9 - 50.0 fL    Platelet Count 285 164 - 446 K/uL    MPV 10.8 9.0 - 12.9 fL    Neutrophils-Polys 71.70 44.00 - 72.00 %    Lymphocytes 16.90 (L) 22.00 - 41.00 %    Monocytes 7.00 0.00 - 13.40 %    Eosinophils 3.10 0.00 - 6.90 %    Basophils 0.40 " 0.00 - 1.80 %    Immature Granulocytes 0.90 0.00 - 0.90 %    Nucleated RBC 0.00 0.00 - 0.20 /100 WBC    Neutrophils (Absolute) 7.71 (H) 1.82 - 7.42 K/uL    Lymphs (Absolute) 1.82 1.00 - 4.80 K/uL    Monos (Absolute) 0.75 0.00 - 0.85 K/uL    Eos (Absolute) 0.33 0.00 - 0.51 K/uL    Baso (Absolute) 0.04 0.00 - 0.12 K/uL    Immature Granulocytes (abs) 0.10 0.00 - 0.11 K/uL    NRBC (Absolute) 0.00 K/uL   Comp Metabolic Panel    Collection Time: 06/06/24  5:28 AM   Result Value Ref Range    Sodium 140 135 - 145 mmol/L    Potassium 4.1 3.6 - 5.5 mmol/L    Chloride 108 96 - 112 mmol/L    Co2 19 (L) 20 - 33 mmol/L    Anion Gap 13.0 7.0 - 16.0    Glucose 109 (H) 65 - 99 mg/dL    Bun 17 8 - 22 mg/dL    Creatinine 0.65 0.50 - 1.40 mg/dL    Calcium 9.1 8.5 - 10.5 mg/dL    Correct Calcium 9.7 8.5 - 10.5 mg/dL    AST(SGOT) 20 12 - 45 U/L    ALT(SGPT) 33 2 - 50 U/L    Alkaline Phosphatase 93 30 - 99 U/L    Total Bilirubin 0.2 0.1 - 1.5 mg/dL    Albumin 3.3 3.2 - 4.9 g/dL    Total Protein 6.3 6.0 - 8.2 g/dL    Globulin 3.0 1.9 - 3.5 g/dL    A-G Ratio 1.1 g/dL   ESTIMATED GFR    Collection Time: 06/06/24  5:28 AM   Result Value Ref Range    GFR (CKD-EPI) 103 >60 mL/min/1.73 m 2       Medications:  Scheduled Medications   Medication Dose Frequency    hydrALAZINE  75 mg 4X/DAY    gabapentin  400 mg TID    benzonatate  100 mg TID    docusate sodium  200 mg BID    vitamin D3  5,000 Units DAILY    [Held by provider] enoxaparin (LOVENOX) injection  40 mg DAILY AT 1800    Pharmacy Consult Request  1 Each PHARMACY TO DOSE    omeprazole  20 mg DAILY    senna-docusate  2 Tablet Q EVENING    amLODIPine  10 mg Q DAY    lisinopril  40 mg Q DAY    tamsulosin  0.4 mg AFTER DINNER     PRN medications: Respiratory Therapy Consult, hydrALAZINE, senna-docusate **AND** polyethylene glycol/lytes, bisacodyl, magnesium hydroxide, acetaminophen, carboxymethylcellulose, benzocaine-menthol, mag hydrox-al hydrox-simeth, ondansetron **OR** ondansetron,  traZODone, sodium chloride, ipratropium-albuterol, melatonin    Diet:  Current Diet Order   Procedures    Diet Order Diet: Regular     CTH 5/24/24 [facility]      [facility]H 6/5/24 [facility]        Medical Decision Making and Plan:  Left Basal Ganglia ICH secondary to hypertensive emergency  Right Hemiparesis  PT and OT for mobility and ADLs. Per guidelines, 15 hours per week between PT, OT and/or SLP.  Follow-up Neurology (Dr. Babb)  Secondary stroke ppx: Anti-hypertensives    Headache  Neck Pain  6/3 VSS. No other symptoms  6/4 Resolved. Tylenol relieved her symptoms. No change in neuro status.   6/5 Continues to have headache which returned last night severely. Recently started on DVT ppx. STAT CTH to ensure no expansion of bleed. CTH showing subacute ICH. D/w rads directly 6/5/2024 3:05 PM - not concerned for it being acute, looks older, but will get [facility] for formal comparison. Hold Lovenox for now to error on side of caution, recent US BLE neg for DVT.      Hypertension  Continue Hydralazine 50mg q6 hours --> 6/5 75mg every 6 hours  Continue Lisinopril 40mg daily   Continue Amlodipine 10mg daily   6/5 SBP elevated into 140's increase hydralazine to 75mg every 6 hours  6/6 Continue to monitor BP on higher dose      Leukocytosis  Improving on admission but unclear cause  Continue Rocephin x 2 doses (through 5/31) - ok to d/c IV after  6/3 high normal in 10's. Historically has had WBC count fluctuating between 10-11  6/6 unchanged at 10.8     Urinary Retention  Has otero - required CIC   Continue Flomax 0.4mg nightly   5/30 Per patient preference keep otero another 1-2 days. D/w her r/o CAUTI. Patient and dtr aware.   6/3 Discontinue IDFC  6/4 Voiding with low PVRs  Resolved    Chronic Cough  Secondary to years of smoking. D/w patient and daughter, nothing has changed in terms of her cough, nothing new or concerning to them.   Tessalon perls scheduled TID    Hyperphosphatemia  6/3 Resolved, Ph normal     Anemia  6/3 Stable at 10.9  -->  10.8     Pain  Tylenol PRN    Neuropathic Pain  6/3 Continue Gabapentin - increase to 400mg TID     GI Ppx  Patient on Prilosec for GERD prophylaxis.      Bowel   Patient on Senna-docusate for constipation prophylaxis.         Upcoming Labs/imagin/10     DVT PROPHYLAXIS: None - Hemorrhagic stroke. Check US - negative. Per Neuro note, clear for DVT chemo-ppx once MRI completed (completed ). Lovenox 40 mg SQ started 6/3. Hold Lovenox 2024 until can formally compare CTH but per d/w radiologist directly low c/f acute bleed as appears older. Reviewed personally and no acute finding on new CTH.      HOSPITALIST FOLLOWING: None     CODE STATUS: FULL CODE     DISPO: Home with minimal support     VIANCA: 6/15/24     MEDS SENT TO: Not [facility]     DISCHARGE SPECIALIST FOLLOW UP: Neurology     Patient to scheduled follow up with their PCP within 2 weeks from discharge from the [facility].

## 2024-06-06 NOTE — THERAPY
Physical Therapy   Daily Treatment     Patient Name: Latanya Ferrera  Age:  58 y.o., Sex:  female  Medical Record #: [id number]  Today's Date: 6/6/2024     Precautions  Precautions: Fall Risk  Comments: R hemiplegia, R loss of sensation in hand, ICH    Subjective    Pt was asleep upon arrival, agreeable to session but she stated she is not doing the  after session. PTA reinforce the importance of all therapy recommend pt to participate, she agrees after education.     Objective       06/06/24 1401   PT Charge Group   PT Gait Training (Units) 2   PT Neuromuscular Re-Education / Balance (Units) 1   PT Therapeutic Activities (Units) 1   Supervising Physical Therapist Rubia Jay   PT Total Time Spent   PT Individual Total Time Spent (Mins) 60   Pain 0 - 10 Group   Location Head;Neck   Pain Rating Scale (NPRS) 10   Therapist Pain Assessment During Activity;Nurse Notified   Cognition    Level of Consciousness Alert   Safety Awareness Impaired   Attention Impaired   Gait Functional Level of Assist    Gait Level Of Assist Moderate Assist   Assistive Device Front Wheel Walker  (w/ and w/o AFO, assisted w/ RUE )   Distance (Feet)   (50x2+ 15x2)   Deviation Step To;Decreased Base Of Support;Decreased Toe Off;Decreased Heel Strike;Shuffled Gait;Ataxic   Wheelchair Functional Level of Assist   Wheelchair Assist Supervised   Distance Wheelchair (Feet or Distance) 150   Wheelchair Description Supervision for safety   Transfer Functional Level of Assist   Bed, Chair, Wheelchair Transfer Contact Guard Assist   Bed Chair Wheelchair Transfer Description Initial preparation for task;Set-up of equipment;Supervision for safety  (stand reach pivot)   Bed Mobility    Supine to Sit Standby Assist   Sit to Stand Contact Guard Assist   Scooting Standby Assist   Interdisciplinary Plan of Care Collaboration   IDT Collaboration with  Therapy Tech   Patient Position at End of Therapy Seated   Collaboration Comments transition care to tech  "      NMES R quad w/ [place], 10/10 cycle w/ cues for AROM during on cycle for 5 mins.      Assessment    Pt demonstrated less motivation but is increasing her ambulation distance. Her R knee ext during stance at ambulation w/ anterior shell AFO. Will trial posterior leaf AFO if available for knee ext control.      Strengths: Able to follow instructions, Alert and oriented, Independent prior level of function, Motivated for self care and independence, Pleasant and cooperative, Supportive family, Willingly participates in therapeutic activities  Barriers: Decreased endurance, Difficulty following instructions, Fatigue, Hemiplegia, Home accessibility, Hypertension, Impaired balance, Impaired carryover of learning, Limited mobility (lives alone, decreased knowledge of condition, limited daytime family support)    Plan    Trial posterior leaf AFO  Ambulation w/ FWW v.s jaciel walker + black anterior shell AFO  NMRE RLE  RLE Strengthening and endurance  Mat program  Standing balance  Shuttle for knee control  Aquatic therapy when appropriate     DME  PT DME Recommendations  Wheelchair: 18\" Width, Jaciel Height (16\"-17\"), Lightweight, Removable/Flip Back Armrests, Standard Leg Rests, Anti-tippers  Cushion: Standard  Assistive Device: Front Wheeled Walker, Other (Comments) (TBD)  Additional Equipment: Other (Comments) (TBD)     Passport items to be completed:  Get in/out of bed safely, in/out of a vehicle, safely use mobility device, walk or wheel around home/community, navigate up and down stairs, show how to get up/down from the ground, ensure home is accessible, demonstrate HEP, complete caregiver training    Physical Therapy Problems (Active)       Problem: Balance       Dates: Start:  05/30/24         Goal: STG-Within one week, patient will maintain static standing c/ 1UE support at SBA or better, >/= 2 minutes.        Dates: Start:  05/30/24               Problem: Mobility       Dates: Start:  05/30/24         Goal: " STG-Within one week, patient will ambulate household distances >/= 50' c/ LRAD and TotalA or better.        Dates: Start:  05/30/24               Problem: Mobility Transfers       Dates: Start:  05/30/24         Goal: STG-Within one week, patient will transfer bed to chair c/ Sonia or better c/ appropriate compensatory method (reach pivot vs SPT).       Dates: Start:  05/30/24               Problem: PT-Long Term Goals       Dates: Start:  05/30/24         Goal: LTG-By discharge, patient will ambulate >/= 300' c/ LRAD and CGA or better.        Dates: Start:  05/30/24            Goal: LTG-By discharge, patient will transfer one surface to another c/ Boni.        Dates: Start:  05/30/24            Goal: LTG-By discharge, patient will ambulate up/down 2 stairs c/ CGA or better to access home.        Dates: Start:  05/30/24            Goal: LTG-By discharge, patient will transfer in/out of a car c/ CGA or better.        Dates: Start:  05/30/24

## 2024-06-06 NOTE — THERAPY
Speech Language Pathology  Daily Treatment     Patient Name: Latanya Ferrera  Age:  58 y.o., Sex:  female  Medical Record #: [id number]  Today's Date: 6/6/2024     Precautions  Precautions: Fall Risk  Comments: R hemiplegia, R loss of sensation in hand, ICH    Subjective    Pt pleasant and cooperative, agreeable to therapy.     Objective       06/06/24 1004   Treatment Charges   SLP Cognitive Skill Development First 15 Minutes 1   SLP Cognitive Skill Development Additional 15 Minutes 3   SLP Total Time Spent   SLP Individual Total Time Spent (Mins) 60   Cognition - Detailed   Moderate Attention Minimal (4)   Interdisciplinary Plan of Care Collaboration   Patient Position at End of Therapy In Bed;Call Light within Reach;Tray Table within Reach         Assessment    Selective visual attention x3 trials. Initial trial: 27% IND, with MIN cues increased to 58%, MOD cues to achieve 100% accuracy.   Pt applying strategies from initial trial to next 2 trials with significant improvement in performance, 82% accuracy, 78% accuracy IND, MIN cues to increase to 100%. Pt using RUE to hold writing implement and mandie targets IND this date.     Strengths: Motivated for self care and independence, Pleasant and cooperative, Willingly participates in therapeutic activities, Supportive family, Making steady progress towards goals, Alert and oriented, Effective communication skills  Barriers: Hemiparesis, Emotional lability, Impaired functional cognition    Plan    Continue to target attention, memory    Passport items to be completed:  manage finances, manage medications, arrive to therapy appointments on time, complete daily memory log entries, solve problems related to safety situations, review education related to hospitalization, complete caregiver training     Speech Therapy Problems (Active)       Problem: Memory STGs       Dates: Start:  05/30/24         Goal: STG-Within one week, patient will demonstrate use of memory strategies in  order to recall information from therapies after a 2/hr delay.        Dates: Start:  05/30/24         Goal Note filed on 06/03/24 1300 by Mya Steven MS,CCC-SLP       Will continue to target.                  Problem: Problem Solving STGs       Dates: Start:  05/30/24         Goal: STG-Within one week, patient will complete alternating attention tasks with 80% accuracy given SPV       Dates: Start:  05/30/24         Goal Note filed on 06/03/24 1300 by Mya Steven MS,CCC-SLP       Will continue to target.                  Problem: Speech/Swallowing LTGs       Dates: Start:  05/30/24         Goal: LTG-By discharge, patient will solve complex problems related to IADL's in order to d/c home with mod I        Dates: Start:  05/30/24

## 2024-06-06 NOTE — DISCHARGE PLANNING
CM rec'd call from patient asking about resources to pay her rent.  KAVIN explained she would have a SW through home health follow up with her when she gets home to go over any community resources to help patient.  CM will continue to monitor for DC needs.

## 2024-06-07 ENCOUNTER — APPOINTMENT (OUTPATIENT)
Dept: OCCUPATIONAL THERAPY | Facility: REHABILITATION | Age: 59
DRG: 057 | End: 2024-06-07
Attending: PHYSICAL MEDICINE & REHABILITATION
Payer: MEDICAID

## 2024-06-07 ENCOUNTER — APPOINTMENT (OUTPATIENT)
Dept: PHYSICAL THERAPY | Facility: REHABILITATION | Age: 59
DRG: 057 | End: 2024-06-07
Attending: PHYSICAL MEDICINE & REHABILITATION
Payer: MEDICAID

## 2024-06-07 ENCOUNTER — APPOINTMENT (OUTPATIENT)
Dept: SPEECH THERAPY | Facility: REHABILITATION | Age: 59
DRG: 057 | End: 2024-06-07
Attending: PHYSICAL MEDICINE & REHABILITATION
Payer: MEDICAID

## 2024-06-07 PROCEDURE — 700102 HCHG RX REV CODE 250 W/ 637 OVERRIDE(OP): Performed by: PHYSICAL MEDICINE & REHABILITATION

## 2024-06-07 PROCEDURE — 97110 THERAPEUTIC EXERCISES: CPT | Mod: CQ

## 2024-06-07 PROCEDURE — 97530 THERAPEUTIC ACTIVITIES: CPT | Mod: CQ

## 2024-06-07 PROCEDURE — 770010 HCHG ROOM/CARE - REHAB SEMI PRIVAT*

## 2024-06-07 PROCEDURE — 700111 HCHG RX REV CODE 636 W/ 250 OVERRIDE (IP): Mod: JZ | Performed by: PHYSICAL MEDICINE & REHABILITATION

## 2024-06-07 PROCEDURE — 97112 NEUROMUSCULAR REEDUCATION: CPT | Mod: CQ

## 2024-06-07 PROCEDURE — A9270 NON-COVERED ITEM OR SERVICE: HCPCS | Performed by: PHYSICAL MEDICINE & REHABILITATION

## 2024-06-07 PROCEDURE — 97129 THER IVNTJ 1ST 15 MIN: CPT

## 2024-06-07 PROCEDURE — 97530 THERAPEUTIC ACTIVITIES: CPT

## 2024-06-07 PROCEDURE — 99232 SBSQ HOSP IP/OBS MODERATE 35: CPT | Performed by: PHYSICAL MEDICINE & REHABILITATION

## 2024-06-07 PROCEDURE — 97130 THER IVNTJ EA ADDL 15 MIN: CPT

## 2024-06-07 RX ORDER — AMOXICILLIN 250 MG
2 CAPSULE ORAL
Status: DISCONTINUED | OUTPATIENT
Start: 2024-06-07 | End: 2024-06-20 | Stop reason: HOSPADM

## 2024-06-07 RX ORDER — POLYETHYLENE GLYCOL 3350 17 G/17G
1 POWDER, FOR SOLUTION ORAL
Status: DISCONTINUED | OUTPATIENT
Start: 2024-06-07 | End: 2024-06-20 | Stop reason: HOSPADM

## 2024-06-07 RX ORDER — LISINOPRIL 20 MG/1
60 TABLET ORAL
Status: DISCONTINUED | OUTPATIENT
Start: 2024-06-08 | End: 2024-06-10

## 2024-06-07 RX ORDER — DOCUSATE SODIUM 100 MG/1
200 CAPSULE, LIQUID FILLED ORAL 2 TIMES DAILY PRN
Status: DISCONTINUED | OUTPATIENT
Start: 2024-06-07 | End: 2024-06-20 | Stop reason: HOSPADM

## 2024-06-07 RX ADMIN — HYDRALAZINE HYDROCHLORIDE 75 MG: 50 TABLET ORAL at 21:05

## 2024-06-07 RX ADMIN — BENZONATATE 100 MG: 100 CAPSULE ORAL at 08:13

## 2024-06-07 RX ADMIN — GABAPENTIN 400 MG: 400 CAPSULE ORAL at 08:13

## 2024-06-07 RX ADMIN — BENZONATATE 100 MG: 100 CAPSULE ORAL at 14:33

## 2024-06-07 RX ADMIN — GABAPENTIN 400 MG: 400 CAPSULE ORAL at 21:05

## 2024-06-07 RX ADMIN — OMEPRAZOLE 20 MG: 20 CAPSULE, DELAYED RELEASE ORAL at 08:13

## 2024-06-07 RX ADMIN — OXYCODONE 2.5 MG: 5 TABLET ORAL at 21:05

## 2024-06-07 RX ADMIN — OXYCODONE 2.5 MG: 5 TABLET ORAL at 14:38

## 2024-06-07 RX ADMIN — ENOXAPARIN SODIUM 40 MG: 100 INJECTION SUBCUTANEOUS at 17:27

## 2024-06-07 RX ADMIN — BENZONATATE 100 MG: 100 CAPSULE ORAL at 21:05

## 2024-06-07 RX ADMIN — ACETAMINOPHEN 500 MG: 500 TABLET, FILM COATED ORAL at 16:31

## 2024-06-07 RX ADMIN — AMLODIPINE BESYLATE 10 MG: 5 TABLET ORAL at 05:08

## 2024-06-07 RX ADMIN — HYDRALAZINE HYDROCHLORIDE 75 MG: 50 TABLET ORAL at 17:27

## 2024-06-07 RX ADMIN — Medication 5000 UNITS: at 08:13

## 2024-06-07 RX ADMIN — GABAPENTIN 400 MG: 400 CAPSULE ORAL at 14:33

## 2024-06-07 RX ADMIN — HYDRALAZINE HYDROCHLORIDE 75 MG: 50 TABLET ORAL at 08:14

## 2024-06-07 RX ADMIN — LISINOPRIL 40 MG: 20 TABLET ORAL at 05:08

## 2024-06-07 RX ADMIN — HYDRALAZINE HYDROCHLORIDE 75 MG: 50 TABLET ORAL at 14:33

## 2024-06-07 ASSESSMENT — ACTIVITIES OF DAILY LIVING (ADL)
BED_CHAIR_WHEELCHAIR_TRANSFER_DESCRIPTION: SQUAT PIVOT TRANSFER TO WHEELCHAIR;SUPERVISION FOR SAFETY;SET-UP OF EQUIPMENT
BED_CHAIR_WHEELCHAIR_TRANSFER_DESCRIPTION: INITIAL PREPARATION FOR TASK;SET-UP OF EQUIPMENT;SUPERVISION FOR SAFETY

## 2024-06-07 ASSESSMENT — PAIN DESCRIPTION - PAIN TYPE
TYPE: ACUTE PAIN
TYPE: ACUTE PAIN

## 2024-06-07 NOTE — PROGRESS NOTES
NURSING DAILY NOTE    Name: Latanya Ferrera   Date of Admission: 5/29/2024   Admitting Diagnosis: Hemorrhagic stroke (HCC)  Attending Physician: Sofi Jules D.o.  Allergies: Patient has no known allergies.    Safety  Patient Assist  Mod Assist  Patient Precautions  Fall Risk  Precaution Comments  R hemiplegia, R loss of sensation in hand, ICH  Bed Transfer Status  Contact Guard Assist  Toilet Transfer Status   Contact Guard Assist  Assistive Devices  Rails, Wheelchair  Oxygen  None - Room Air  Diet/Therapeutic Dining  Current Diet Order   Procedures    Diet Order Diet: Regular     Pill Administration  whole  Agitated Behavioral Scale     ABS Level of Severity       Fall Risk  Has the patient had a fall this admission?   No  Carly Fisher Fall Risk Scoring  19, HIGH RISK  Fall Risk Safety Measures  bed alarm, chair alarm, poor balance, and low vision/ hearing    Vitals  Temperature: 36.7 °C (98.1 °F)  Temp src: Oral  Pulse: 72  Respiration: 18  Blood Pressure: (!) 140/75  Blood Pressure MAP (Calculated): 97 MM HG  BP Location: Left, Upper Arm  Patient BP Position: Jolley's Position     Oxygen  Pulse Oximetry: 96 %  O2 (LPM): 0  O2 Delivery Device: None - Room Air    Bowel and Bladder  Last Bowel Movement  06/06/24  Stool Type  Type 4: Like a sausage or snake, smooth and soft  Bowel Device  Bathroom  Continent  Bladder: Did not void   Bowel: No movement  Bladder Function  Urine Void (mL):  (Large)  Number of Times Voided: 1  Urinary Options: Yes  Urine Color: Unable To Evaluate  Genitourinary Assessment   Bladder Assessment (WDL):  Within Defined Limits  Ramirez Catheter: Not Applicable  Ramirez Reasons per MD Order: Acute urinary retention or bladder outlet obstruction  Ramirez Care: Given with Soap and Water  Urinary Elimination: Catheter (Document on LDA)  Urine Color: Unable To Evaluate  Bladder Device: Bathroom  Bladder Scan: Post Void  $ Bladder Scan Results  (mL): 44  Bladder Medications: Yes    Skin  Xavier Score   17  Sensory Interventions   Bed Types: Standard/Trauma Mattress  Skin Preventative Measures: Waffle Overlay  Moisture Interventions  Moisturizers/Barriers: Barrier Wipes  Containment Devices: Indwelling Catheter      Pain  Pain Rating Scale  6 - Hard to ignore, avoid usual activities  Pain Location  Head, Neck  Pain Location Orientation  Right  Pain Interventions   Declines    ADLs    Bathing   Family / Significant Other  Linen Change   Partial  Personal Hygiene     Chlorhexidine Bath      Oral Care  Brushed Teeth  Teeth/Dentures  Missing Teeth (Comments)  Shave     Nutrition Percentage Eaten  *  * Meal *  *, Breakfast, Between % Consumed  Environmental Precautions  Treaded Slipper Socks on Patient  Patient Turns/Positioning  Patient Turns Self from Side to Side  Patient Turns Assistance/Tolerance  Assistance of One  Bed Positions  Bed Controls On, Bed Locked  Head of Bed Elevated  Less than 30 degrees      Psychosocial/Neurologic Assessment  Psychosocial Assessment  Psychosocial (WDL):  WDL Except  Patient Behaviors: Fatigue  Family Behaviors: No Family Present  Neurologic Assessment  Neuro (WDL): Exceptions to WDL  Level of Consciousness: Alert  Orientation Level: Oriented X4  Cognition: Follows commands, Appropriate attention/concentration  Speech: Clear  Pupil Assesment: No  R Pupil Size (mm): 3  R Pupil Shape / Description: Round  R Pupil Reaction: Brisk  L Pupil Size (mm): 3  L Pupil Shape / Description: Round  L Pupil Reaction: Brisk  Motor Function/Sensation Assessment: Sensation, Motor strength  RUE Sensation: Numbness  Muscle Strength Right Arm: Fair Strength against Gravity but No Resistance  LUE Sensation: Full sensation  Muscle Strength Left Arm: Normal Strength Against Gravity and Full Resistance  RLE Sensation: Numbness  Muscle Strength Right Leg: Fair Strength against Gravity but No Resistance  LLE Sensation: Full sensation  Muscle  Strength Left Leg: Normal Strength Against Gravity and Full Resistance  EENT (WDL):  Within Defined Limits    Cardio/Pulmonary Assessment  Edema   RLE Edema: Trace  LLE Edema: Trace  Respiratory Breath Sounds  RUL Breath Sounds: Clear  RML Breath Sounds: Clear  RLL Breath Sounds: Clear  OZIEL Breath Sounds: Clear  LLL Breath Sounds: Clear  Cardiac Assessment   Cardiac (WDL):  WDL Except

## 2024-06-07 NOTE — PROGRESS NOTES
NURSING DAILY NOTE    Name: Latanya Ferrera   Date of Admission: 5/29/2024   Admitting Diagnosis: Hemorrhagic stroke (HCC)  Attending Physician: Sofi Jules D.o.  Allergies: Patient has no known allergies.    Safety  Patient Assist  Mod Assist  Patient Precautions  Fall Risk  Precaution Comments  R hemiplegia, R loss of sensation in hand, ICH  Bed Transfer Status  Contact Guard Assist  Toilet Transfer Status   Contact Guard Assist  Assistive Devices  Rails, Wheelchair  Oxygen  None - Room Air  Diet/Therapeutic Dining  Current Diet Order   Procedures    Diet Order Diet: Regular     Pill Administration  whole  Agitated Behavioral Scale     ABS Level of Severity       Fall Risk  Has the patient had a fall this admission?   No  Carly Fisher Fall Risk Scoring  16, HIGH RISK  Fall Risk Safety Measures  bed alarm, chair alarm, poor balance, and low vision/ hearing    Vitals  Temperature: 36.9 °C (98.4 °F)  Temp src: Oral  Pulse: 84  Respiration: 17  Blood Pressure: 135/72  Blood Pressure MAP (Calculated): 93 MM HG  BP Location: Left, Upper Arm  Patient BP Position: Jolley's Position     Oxygen  Pulse Oximetry: 95 %  O2 (LPM): 0  O2 Delivery Device: None - Room Air    Bowel and Bladder  Last Bowel Movement  06/06/24  Stool Type  Type 4: Like a sausage or snake, smooth and soft  Bowel Device  Bathroom  Continent  Bladder: Did not void   Bowel: No movement  Bladder Function  Urine Void (mL):  (Large)  Number of Times Voided: 1  Urinary Options: Yes  Urine Color: Yellow  Genitourinary Assessment   Bladder Assessment (WDL):  Within Defined Limits  Ramirez Catheter: Not Applicable  Ramirez Reasons per MD Order: Acute urinary retention or bladder outlet obstruction  Raimrez Care: Given with Soap and Water  Urinary Elimination: Catheter (Document on LDA)  Urine Color: Yellow  Bladder Device: Bathroom  Bladder Scan: Post Void  $ Bladder Scan Results (mL): 44  Bladder  Medications: Yes    Skin  Xavier Score   17  Sensory Interventions   Bed Types: Standard/Trauma Mattress  Skin Preventative Measures: Pillows in Use for Support / Positioning  Moisture Interventions  Moisturizers/Barriers: Barrier Wipes  Containment Devices: Indwelling Catheter      Pain  Pain Rating Scale  10 - As bad as it could be, nothing else matters  Pain Location  Head, Neck  Pain Location Orientation  Right  Pain Interventions   Declines    ADLs    Bathing   Family / Significant Other  Linen Change   Partial  Personal Hygiene     Chlorhexidine Bath      Oral Care  Brushed Teeth  Teeth/Dentures  Missing Teeth (Comments)  Shave     Nutrition Percentage Eaten  Breakfast, Between 50-75% Consumed  Environmental Precautions  Treaded Slipper Socks on Patient, Bed in Low Position  Patient Turns/Positioning  Patient Turns Self from Side to Side  Patient Turns Assistance/Tolerance  Assistance of One  Bed Positions  Bed Controls On, Bed Locked  Head of Bed Elevated  Less than 30 degrees      Psychosocial/Neurologic Assessment  Psychosocial Assessment  Psychosocial (WDL):  WDL Except  Patient Behaviors: Fatigue  Family Behaviors: No Family Present  Neurologic Assessment  Neuro (WDL): Exceptions to WDL  Level of Consciousness: Alert  Orientation Level: Oriented X4  Cognition: Follows commands, Appropriate attention/concentration  Speech: Clear  Pupil Assesment: No  R Pupil Size (mm): 3  R Pupil Shape / Description: Round  R Pupil Reaction: Brisk  L Pupil Size (mm): 3  L Pupil Shape / Description: Round  L Pupil Reaction: Brisk  Motor Function/Sensation Assessment: Sensation, Motor strength  RUE Sensation: Numbness  Muscle Strength Right Arm: Fair Strength against Gravity but No Resistance  LUE Sensation: Full sensation  Muscle Strength Left Arm: Normal Strength Against Gravity and Full Resistance  RLE Sensation: Numbness  Muscle Strength Right Leg: Fair Strength against Gravity but No Resistance  LLE Sensation: Full  sensation  Muscle Strength Left Leg: Normal Strength Against Gravity and Full Resistance  EENT (WDL):  Within Defined Limits    Cardio/Pulmonary Assessment  Edema   RLE Edema: Trace  LLE Edema: Trace  Respiratory Breath Sounds  RUL Breath Sounds: Clear  RML Breath Sounds: Clear  RLL Breath Sounds: Diminished  OZIEL Breath Sounds: Clear  LLL Breath Sounds: Diminished  Cardiac Assessment   Cardiac (WDL):  WDL Except

## 2024-06-07 NOTE — THERAPY
Occupational Therapy  Daily Treatment     Patient Name: Latanya Ferrera  Age:  58 y.o., Sex:  female  Medical Record #: [id number]  Today's Date: 6/7/2024     Precautions  Precautions: (P) Fall Risk  Comments: R hemiplegia, R loss of sensation in hand, ICH         Subjective    Pt was seated upright in bed and agreeable for OT session.      Objective       06/06/24 0831   OT Charge Group   OT Neuromuscular Re-education / Balance (Units) 2   OT Therapeutic Exercise (Units) 2   OT Total Time Spent   OT Individual Total Time Spent (Mins) 60   Precautions   Precautions Fall Risk   Functional Level of Assist   Bed, Chair, Wheelchair Transfer Contact Guard Assist   Bed Chair Wheelchair Transfer Description Increased time;Set-up of equipment;Squat pivot transfer to wheelchair   Standing Upper Body Exercises   Chest Press 3 sets of 10;Right   Front Arm Raise 3 sets of 10;Right   Bicep Curls 3 sets of 10;Right;Weight (See Comments for lbs)  (#1)   Wrist Flexion / Extension 3 sets of 10;Right;Weight (See Comments for lbs)  (#1)   Comments Pt was standing in standing frame   Balance   Comments Pt was placed in standing frame to increse WB-ing on BLE while completing UB strengthening on RUE.     -CGA for a squat pivot transfer from bed>w/c, w/c<>EOB in prep for standing frame.   -Pt was able to grasp a 1lb weight during bicep curs as well as pronation/sup and wrist flex/ext however needed support to assist in wrist control throughout all thera ex.   -2 seated rest breaks from standing frame after each set of exercises. Needed slight VC for standing upright position while in standing frame.      Assessment    Pt tolerated session well w/ focus on RUE strengthening. Standing tolerance, and neuro re-ed to maximize Ind in functional tasks.   Strengths: Able to follow instructions, Good insight into deficits/needs, Independent prior level of function, Manages pain appropriately, Motivated for self care and independence, Pleasant and  cooperative, Supportive family, Willingly participates in therapeutic activities  Barriers: Decreased endurance, Hemiparesis, Home accessibility, Impaired activity tolerance, Impaired balance (Impaired coordination/motor control)    Plan    ADLs, safety with transfers, neuro re-ed R UE, standing tolerance/balance,  3x/week with therapy techs for adjunct therapy     DME  OT DME Recommendations  Bathroom Equipment: Tub Transfer Bench (Medicaid Only)  Additional Equipment: Other (Comments) (TBD)    Passport items to be completed:  Perform bathroom transfers, complete dressing, complete feeding, get ready for the day, prepare a simple meal, participate in household tasks, adapt home for safety needs, demonstrate home exercise program, complete caregiver training     Occupational Therapy Goals (Active)       Problem: Bathing       Dates: Start:  05/30/24         Goal: STG-Within one week, patient will bathe with MIN A and LRAD       Dates: Start:  05/30/24         Goal Note filed on 06/03/24 0946 by Fox Castro, OT/L       Not assessed, patient has declined bathing at rehab                 Problem: Dressing       Dates: Start:  06/03/24         Goal: STG-Within one week, patient will dress LB with setup and SBA and commode over toilet       Dates: Start:  06/03/24               Problem: Functional Transfers       Dates: Start:  06/03/24         Goal: STG-Within one week, patient will transfer to toilet with SBA using grab bar and commode over toilet       Dates: Start:  06/03/24               Problem: OT Long Term Goals       Dates: Start:  05/30/24         Goal: LTG-By discharge, patient will complete basic self care tasks with SPV-MOD I and LRAD       Dates: Start:  05/30/24            Goal: LTG-By discharge, patient will perform bathroom transfers with SPV-MOD I and LRAD       Dates: Start:  05/30/24

## 2024-06-07 NOTE — THERAPY
Physical Therapy   Daily Treatment     Patient Name: Latanya Ferrera  Age:  58 y.o., Sex:  female  Medical Record #: [id number]  Today's Date: 6/7/2024     Precautions  Precautions: Fall Risk  Comments: R hemiplegia, R loss of sensation in hand, ICH    Subjective    Pt seated in w/c upon arrival in front of the sink completing ADL, upset about 7 am session but agreeable to session.      Objective       06/07/24 0701   PT Charge Group   PT Therapeutic Exercise (Units) 1   PT Neuromuscular Re-Education / Balance (Units) 2   PT Therapeutic Activities (Units) 1   Supervising Physical Therapist Rubia Jay   PT Total Time Spent   PT Individual Total Time Spent (Mins) 60   Cognition    Level of Consciousness Alert   Wheelchair Functional Level of Assist   Wheelchair Assist Supervised   Distance Wheelchair (Feet or Distance) 150   Wheelchair Description Supervision for safety   Transfer Functional Level of Assist   Bed, Chair, Wheelchair Transfer Contact Guard Assist   Bed Chair Wheelchair Transfer Description Initial preparation for task;Set-up of equipment;Supervision for safety  (stand reach pivot)   Bed Mobility    Supine to Sit Standby Assist   Sit to Supine Standby Assist   Scooting Standby Assist   Rolling Standby Assist   Interdisciplinary Plan of Care Collaboration   Patient Position at End of Therapy In Bed;Call Light within Reach;Tray Table within Reach;Phone within Reach   Physical Therapist Assigned   Assigned PT / Treatment Time / Comments Christina. 60-90   Roll Left and Right   Assistance Needed Supervision   CARE Score - Roll Left and Right 4   Sit to Lying   Assistance Needed Supervision   CARE Score - Sit to Lying 4   Lying to Stting on Side of Bed   Assistance Needed Supervision   CARE Score - Lying to Sitting on Side of Bed 4   Sit to Stand   Assistance Needed Incidental touching   CARE Score - Sit to Stand 4   Chair/Bed-to-Chair Transfer   Assistance Needed Incidental touching   CARE Score - Chair/Bed-to-Chair  Transfer 4   Walk 10 Feet   Assistance Needed Physical assistance   Physical Assistance Level 26%-50%   CARE Score - Walk 10 Feet 3   Walk 50 Feet with Two Turns   Assistance Needed Physical assistance   Physical Assistance Level 26%-50%   CARE Score - Walk 50 Feet with Two Turns 3   Wheel 50 Feet with Two Turns   Assistance Needed Supervision   CARE Score - Wheel 50 Feet with Two Turns 4   Wheel 50 Feet   Assistance Needed Supervision   CARE Score - Wheel 150 Feet 4     NMRE on therapy mat-  Prone scapular push up 1x10  Prone on elbow <> prone on hand 1x10 w/ assisting RUE  Prone > quadriped > kneeling <> tall kneeling w/ BUE supported from PTA 1x10  Tall kneeling crawling forw/ lateral L <>R ~ 5 ft x1 each    BLE motomed for 15 mins, cues for active paddling but machine was not able to detect active movement.    Assessment    Pt tolerated session fairly, she was self limited during session and required frequent RBs in between. PTA spoke to dtr Lidia on the phone and set up FT for next Wednesday, dtr stated they did have the discussion regarding d/c plan but do not have a conclusion yet. Pt would like to be d/c to home(1STE) by herself but dtr is open to have her d/c to her house which has no step and is w/c accessible in the apartment.       Strengths: Able to follow instructions, Alert and oriented, Independent prior level of function, Motivated for self care and independence, Pleasant and cooperative, Supportive family, Willingly participates in therapeutic activities  Barriers: Decreased endurance, Difficulty following instructions, Fatigue, Hemiplegia, Home accessibility, Hypertension, Impaired balance, Impaired carryover of learning, Limited mobility (lives alone, decreased knowledge of condition, limited daytime family support)    Plan    Trial posterior leaf AFO  Ambulation w/ FWW v.s john walker + black anterior shell AFO  NMRE RLE  RLE Strengthening and endurance  Mat program  Standing balance  Shuttle  "for knee control  Aquatic therapy when appropriate     DME  PT DME Recommendations  Wheelchair: 18\" Width, Jaciel Height (16\"-17\"), Lightweight, Removable/Flip Back Armrests, Standard Leg Rests, Anti-tippers  Cushion: Standard  Assistive Device: Front Wheeled Walker, Other (Comments) (TBD)  Additional Equipment: Other (Comments) (TBD)     Passport items to be completed:  Get in/out of bed safely, in/out of a vehicle, safely use mobility device, walk or wheel around home/community, navigate up and down stairs, show how to get up/down from the ground, ensure home is accessible, demonstrate HEP, complete caregiver training       Physical Therapy Problems (Active)       Problem: Balance       Dates: Start:  05/30/24         Goal: STG-Within one week, patient will maintain static standing c/ 1UE support at SBA or better, >/= 2 minutes.        Dates: Start:  05/30/24               Problem: Mobility       Dates: Start:  05/30/24         Goal: STG-Within one week, patient will ambulate household distances >/= 50' c/ LRAD and TotalA or better.        Dates: Start:  05/30/24               Problem: Mobility Transfers       Dates: Start:  05/30/24         Goal: STG-Within one week, patient will transfer bed to chair c/ Sonia or better c/ appropriate compensatory method (reach pivot vs SPT).       Dates: Start:  05/30/24               Problem: PT-Long Term Goals       Dates: Start:  05/30/24         Goal: LTG-By discharge, patient will ambulate >/= 300' c/ LRAD and CGA or better.        Dates: Start:  05/30/24            Goal: LTG-By discharge, patient will transfer one surface to another c/ Boni.        Dates: Start:  05/30/24            Goal: LTG-By discharge, patient will ambulate up/down 2 stairs c/ CGA or better to access home.        Dates: Start:  05/30/24            Goal: LTG-By discharge, patient will transfer in/out of a car c/ CGA or better.        Dates: Start:  05/30/24              "

## 2024-06-07 NOTE — THERAPY
Occupational Therapy  Daily Treatment     Patient Name: Latanya Ferrera  Age:  58 y.o., Sex:  female  Medical Record #: [id number]  Today's Date: 6/7/2024     Precautions  Precautions: (P) Fall Risk  Comments: R hemiplegia, R loss of sensation in hand, ICH         Subjective    Patient was in bed talking on the phone.  She reported it was an important phone call and requested several more minutes to talk.  During session, patient reported she would like to work with someone other than her primary PT from now on.       Objective       06/07/24 0931   OT Charge Group   OT Therapy Activity (Units) 4   OT Total Time Spent   OT Individual Total Time Spent (Mins) 60   Precautions   Precautions Fall Risk   Functional Level of Assist   Bed, Chair, Wheelchair Transfer Standby Assist   Bed Chair Wheelchair Transfer Description Squat pivot transfer to wheelchair;Supervision for safety;Set-up of equipment  (setup and close SBA squat pivot bed to w/c to the left and then w/c to bed with bedrail)   Bed Mobility    Supine to Sit Standby Assist   Sit to Supine Supervised   Scooting Standby Assist   Outcome Measures   Outcome Measures Utilized Box and Blocks Test   Box and Blocks Test   Right hand blocks moved in 60 seconds 8   Left hand blocks moved in 60 seconds 53   Interdisciplinary Plan of Care Collaboration   IDT Collaboration with  Physical Therapist;Other (See Comments)  ()   Patient Position at End of Therapy In Bed;Call Light within Reach;Tray Table within Reach;Phone within Reach   Collaboration Comments patient requested to work with a different PT for the remainder of her stay     Bilateral UE integration and functional task of folding three washcloths on tabletop, then picking up with R UE and creating a pile of them.    After completion of box and blocks test, patient continued to  blocks with R UE and move to left side as a treatment x 5 minutes.      Used R UE to unstack 13 cones from the right and  re-stack them on the left.  Movements during this task included grasp and release, forearm pronation/supination, shoulder flexion and horizontal shoulder ab/adduction.     Removed 6 yellow and 6 red resistive clothespins from horizontal bar and placed them into clothespin bucket with R UE.    Assessment    Patient's R UE functional use continues to improve.  She does well with encouragement.  She required min A to fully pronate R forearm during cone task.    Strengths: Able to follow instructions, Good insight into deficits/needs, Independent prior level of function, Manages pain appropriately, Motivated for self care and independence, Pleasant and cooperative, Supportive family, Willingly participates in therapeutic activities  Barriers: Decreased endurance, Hemiparesis, Home accessibility, Impaired activity tolerance, Impaired balance (Impaired coordination/motor control)    Plan    ADLs, safety with transfers, neuro re-ed R UE, standing tolerance/balance,  3x/week with therapy techs for adjunct therapy     DME  OT DME Recommendations  Bathroom Equipment: Tub Transfer Bench (Medicaid Only)  Additional Equipment: Other (Comments) (TBD)        Occupational Therapy Goals (Active)       Problem: Bathing       Dates: Start:  05/30/24         Goal: STG-Within one week, patient will bathe with MIN A and LRAD       Dates: Start:  05/30/24         Goal Note filed on 06/03/24 0946 by Fox Catsro OT/L       Not assessed, patient has declined bathing at rehab                 Problem: Dressing       Dates: Start:  06/03/24         Goal: STG-Within one week, patient will dress LB with setup and SBA and commode over toilet       Dates: Start:  06/03/24               Problem: Functional Transfers       Dates: Start:  06/03/24         Goal: STG-Within one week, patient will transfer to toilet with SBA using grab bar and commode over toilet       Dates: Start:  06/03/24               Problem: OT Long Term Goals        Dates: Start:  05/30/24         Goal: LTG-By discharge, patient will complete basic self care tasks with SPV-MOD I and LRAD       Dates: Start:  05/30/24            Goal: LTG-By discharge, patient will perform bathroom transfers with SPV-MOD I and LRAD       Dates: Start:  05/30/24

## 2024-06-07 NOTE — PROGRESS NOTES
NURSING DAILY NOTE    Name: Latanya Ferrera   Date of Admission: 5/29/2024   Admitting Diagnosis: Hemorrhagic stroke (HCC)  Attending Physician: Sofi Jules D.o.  Allergies: Patient has no known allergies.    Safety  Patient Assist  Mod Assist  Patient Precautions  Fall Risk  Precaution Comments  R hemiplegia, R loss of sensation in hand, ICH  Bed Transfer Status  Contact Guard Assist  Toilet Transfer Status   Contact Guard Assist  Assistive Devices  Rails, Wheelchair  Oxygen  None - Room Air  Diet/Therapeutic Dining  Current Diet Order   Procedures    Diet Order Diet: Regular     Pill Administration  whole  Agitated Behavioral Scale     ABS Level of Severity       Fall Risk  Has the patient had a fall this admission?   No  Carly Fisher Fall Risk Scoring  16, HIGH RISK  Fall Risk Safety Measures  bed alarm, chair alarm, and poor balance    Vitals  Temperature: 36.7 °C (98.1 °F)  Temp src: Oral  Pulse: 76  Respiration: 18  Blood Pressure: 124/66  Blood Pressure MAP (Calculated): 85 MM HG  BP Location: Left, Upper Arm  Patient BP Position: Jolley's Position     Oxygen  Pulse Oximetry: 96 %  O2 (LPM): 0  O2 Delivery Device: None - Room Air    Bowel and Bladder  Last Bowel Movement  06/06/24  Stool Type  Type 4: Like a sausage or snake, smooth and soft  Bowel Device  Bathroom  Continent  Bladder: Did not void   Bowel: No movement  Bladder Function  Urine Void (mL):  (Large)  Number of Times Voided: 1  Urinary Options: Yes  Urine Color: Yellow  Genitourinary Assessment   Bladder Assessment (WDL):  Within Defined Limits  Ramirez Catheter: Not Applicable  Ramirez Reasons per MD Order: Acute urinary retention or bladder outlet obstruction  Ramirez Care: Given with Soap and Water  Urinary Elimination: Catheter (Document on LDA)  Urine Color: Yellow  Bladder Device: Bathroom  Bladder Scan: Post Void  $ Bladder Scan Results (mL): 44  Bladder Medications:  Yes    Skin  Xavier Score   17  Sensory Interventions   Bed Types: Standard/Trauma Mattress  Skin Preventative Measures: Waffle Overlay  Moisture Interventions  Moisturizers/Barriers: Barrier Wipes  Containment Devices: Indwelling Catheter      Pain  Pain Rating Scale  6 - Hard to ignore, avoid usual activities  Pain Location  Head, Neck  Pain Location Orientation  Right  Pain Interventions   Medication (see MAR)    ADLs    Bathing   Family / Significant Other  Linen Change   Partial  Personal Hygiene     Chlorhexidine Bath      Oral Care  Brushed Teeth  Teeth/Dentures  Missing Teeth (Comments)  Shave     Nutrition Percentage Eaten  Breakfast, Between 50-75% Consumed  Environmental Precautions  Treaded Slipper Socks on Patient, Bed in Low Position  Patient Turns/Positioning  Patient Turns Self from Side to Side  Patient Turns Assistance/Tolerance  Assistance of One  Bed Positions  Bed Controls On, Bed Locked  Head of Bed Elevated  Less than 30 degrees      Psychosocial/Neurologic Assessment  Psychosocial Assessment  Psychosocial (WDL):  WDL Except  Patient Behaviors: Fatigue  Family Behaviors: No Family Present  Neurologic Assessment  Neuro (WDL): Exceptions to WDL  Level of Consciousness: Alert  Orientation Level: Oriented X4  Cognition: Follows commands, Appropriate attention/concentration  Speech: Clear  Pupil Assesment: No  R Pupil Size (mm): 3  R Pupil Shape / Description: Round  R Pupil Reaction: Brisk  L Pupil Size (mm): 3  L Pupil Shape / Description: Round  L Pupil Reaction: Brisk  Motor Function/Sensation Assessment: Sensation, Motor strength  RUE Sensation: Numbness  Muscle Strength Right Arm: Fair Strength against Gravity but No Resistance  LUE Sensation: Full sensation  Muscle Strength Left Arm: Normal Strength Against Gravity and Full Resistance  RLE Sensation: Numbness  Muscle Strength Right Leg: Fair Strength against Gravity but No Resistance  LLE Sensation: Full sensation  Muscle Strength Left Leg:  Normal Strength Against Gravity and Full Resistance  EENT (WDL):  Within Defined Limits    Cardio/Pulmonary Assessment  Edema   RLE Edema: Trace  LLE Edema: Trace  Respiratory Breath Sounds  RUL Breath Sounds: Clear  RML Breath Sounds: Clear  RLL Breath Sounds: Diminished  OZIEL Breath Sounds: Clear  LLL Breath Sounds: Diminished  Cardiac Assessment   Cardiac (WDL):  WDL Except

## 2024-06-07 NOTE — PROGRESS NOTES
"  Physical Medicine & Rehabilitation Progress Note    Encounter Date: 6/7/2024    Chief Complaint: getting strength back     Interval Events (Subjective):  VSS - Still elevated in 140's   BM 6/6 - Patient refusing bowel meds  Voiding     Seen and examined in her room. Getting strength back in her right arm and hand.       ROS: 14 point ROS negative unless otherwise specified in the HPI    Objective:  VITAL SIGNS: BP (!) 140/75   Pulse 72   Temp 36.7 °C (98.1 °F) (Oral)   Resp 18   Ht 1.676 m (5' 6\")   Wt 81.6 kg (180 lb)   SpO2 96%   BMI 29.05 kg/m²     GEN: No apparent distress  HEENT: Head normocephalic, atraumatic.  Sclera nonicteric bilaterally, no ocular discharge appreciated bilaterally.  PULMONARY: Breathing nonlabored on room air, no respiratory accessory muscle use.  Not requiring supplemental oxygen.  SKIN: No appreciable skin breakdown on exposed areas of skin.  PSYCH: Mood and affect within normal limits.  NEURO: Awake alert.  Conversational.  Logical thought content. Right hemiparesis improving    Laboratory Values:  Recent Results (from the past 72 hour(s))   CBC WITH DIFFERENTIAL    Collection Time: 06/06/24  5:28 AM   Result Value Ref Range    WBC 10.8 4.8 - 10.8 K/uL    RBC 3.62 (L) 4.20 - 5.40 M/uL    Hemoglobin 10.8 (L) 12.0 - 16.0 g/dL    Hematocrit 33.4 (L) 37.0 - 47.0 %    MCV 92.3 81.4 - 97.8 fL    MCH 29.8 27.0 - 33.0 pg    MCHC 32.3 32.2 - 35.5 g/dL    RDW 43.2 35.9 - 50.0 fL    Platelet Count 285 164 - 446 K/uL    MPV 10.8 9.0 - 12.9 fL    Neutrophils-Polys 71.70 44.00 - 72.00 %    Lymphocytes 16.90 (L) 22.00 - 41.00 %    Monocytes 7.00 0.00 - 13.40 %    Eosinophils 3.10 0.00 - 6.90 %    Basophils 0.40 0.00 - 1.80 %    Immature Granulocytes 0.90 0.00 - 0.90 %    Nucleated RBC 0.00 0.00 - 0.20 /100 WBC    Neutrophils (Absolute) 7.71 (H) 1.82 - 7.42 K/uL    Lymphs (Absolute) 1.82 1.00 - 4.80 K/uL    Monos (Absolute) 0.75 0.00 - 0.85 K/uL    Eos (Absolute) 0.33 0.00 - 0.51 K/uL    Baso " (Absolute) 0.04 0.00 - 0.12 K/uL    Immature Granulocytes (abs) 0.10 0.00 - 0.11 K/uL    NRBC (Absolute) 0.00 K/uL   Comp Metabolic Panel    Collection Time: 06/06/24  5:28 AM   Result Value Ref Range    Sodium 140 135 - 145 mmol/L    Potassium 4.1 3.6 - 5.5 mmol/L    Chloride 108 96 - 112 mmol/L    Co2 19 (L) 20 - 33 mmol/L    Anion Gap 13.0 7.0 - 16.0    Glucose 109 (H) 65 - 99 mg/dL    Bun 17 8 - 22 mg/dL    Creatinine 0.65 0.50 - 1.40 mg/dL    Calcium 9.1 8.5 - 10.5 mg/dL    Correct Calcium 9.7 8.5 - 10.5 mg/dL    AST(SGOT) 20 12 - 45 U/L    ALT(SGPT) 33 2 - 50 U/L    Alkaline Phosphatase 93 30 - 99 U/L    Total Bilirubin 0.2 0.1 - 1.5 mg/dL    Albumin 3.3 3.2 - 4.9 g/dL    Total Protein 6.3 6.0 - 8.2 g/dL    Globulin 3.0 1.9 - 3.5 g/dL    A-G Ratio 1.1 g/dL   ESTIMATED GFR    Collection Time: 06/06/24  5:28 AM   Result Value Ref Range    GFR (CKD-EPI) 103 >60 mL/min/1.73 m 2       Medications:  Scheduled Medications   Medication Dose Frequency    hydrALAZINE  75 mg 4X/DAY    gabapentin  400 mg TID    benzonatate  100 mg TID    docusate sodium  200 mg BID    vitamin D3  5,000 Units DAILY    [Held by provider] enoxaparin (LOVENOX) injection  40 mg DAILY AT 1800    Pharmacy Consult Request  1 Each PHARMACY TO DOSE    omeprazole  20 mg DAILY    senna-docusate  2 Tablet Q EVENING    amLODIPine  10 mg Q DAY    lisinopril  40 mg Q DAY    tamsulosin  0.4 mg AFTER DINNER     PRN medications: oxyCODONE immediate-release, Respiratory Therapy Consult, hydrALAZINE, senna-docusate **AND** polyethylene glycol/lytes, bisacodyl, magnesium hydroxide, acetaminophen, carboxymethylcellulose, benzocaine-menthol, mag hydrox-al hydrox-simeth, ondansetron **OR** ondansetron, traZODone, sodium chloride, ipratropium-albuterol, melatonin    Diet:  Current Diet Order   Procedures    Diet Order Diet: Regular     [facility] 5/24/24 [facility] 6/5/24 [facility]      Study is compared to prior from 5/28/2024. Since the previous study the left basal  ganglia/thalamic hemorrhage has decreased in size. Previously was 1.7 x 1.3 cm and currently 1.2 x 1.0 cm. Surrounding vasogenic edema is similar.       Medical Decision Making and Plan:  Left Basal Ganglia ICH secondary to hypertensive emergency  Right Hemiparesis  PT and OT for mobility and ADLs. Per guidelines, 15 hours per week between PT, OT and/or SLP.  Follow-up Neurology (Dr. Babb)  Secondary stroke ppx: Anti-hypertensives    Headache  Neck Pain  6/3 VSS. No other symptoms  6/4 Resolved. Tylenol relieved her symptoms. No change in neuro status.   6/5 Continues to have headache which returned last night severely. Recently started on DVT ppx. STAT CTH to ensure no expansion of bleed. CTH showing subacute ICH. D/w rads directly 6/5/2024 3:05 PM - not concerned for it being acute, looks older, but will get [facility] for formal comparison. Hold Lovenox for now to error on side of caution, recent US BLE neg for DVT.   6/6 Oxycodone 2.5mg daily PRN headache     Hypertension  Continue Hydralazine 50mg q6 hours --> 6/5 75mg every 6 hours  Continue Lisinopril 40mg daily --> 6/7 60mg daily    Continue Amlodipine 10mg daily   6/5 SBP elevated into 140's increase hydralazine to 75mg every 6 hours  6/7 Still with elevated readings in 140's. Consider renal artery US. Increase Lisinopril to 60mg daily. Monitor renal function.     Leukocytosis  Improving on admission but unclear cause  Continue Rocephin x 2 doses (through 5/31) - ok to d/c IV after  6/3 high normal in 10's. Historically has had WBC count fluctuating between 10-11 6/6 unchanged at 10.8     Urinary Retention  Has otero - required CIC   5/30 Per patient preference keep otero another 1-2 days. D/w her r/o CAUTI. Patient and dtr aware.   6/3 Discontinue IDFC  6/4 Voiding with low PVRs  6/7 Resolved. Stop Flomax.     Chronic Cough  Secondary to years of smoking. D/w patient and daughter, nothing has changed in terms of her cough, nothing new or concerning to them.    Tessalon perls scheduled TID    Hyperphosphatemia  6/3 Resolved, Ph normal     Anemia  6/3 Stable at 10.9 -->  10.8    Neuropathic Pain  6/3 Continue Gabapentin - increase to 400mg TID     Bowel    Scheduled bowel medications made PRN as patient refusing for several days.         Upcoming Labs/imagin/10     DVT PROPHYLAXIS: None - Hemorrhagic stroke. Check US - negative. Per Neuro note, clear for DVT chemo-ppx once MRI completed (completed ). Lovenox 40 mg SQ started 6/3. Hold Lovenox 2024 until can formally compare CTH but per d/w radiologist directly low c/f acute bleed as appears older. Reviewed personally and no acute finding on new CTH.  Restart Lovenox ppx.      HOSPITALIST FOLLOWING: None     CODE STATUS: FULL CODE     DISPO: Home with minimal support     VIANCA: 6/15/24     MEDS SENT TO: Not [facility]     DISCHARGE SPECIALIST FOLLOW UP: Neurology     Patient to scheduled follow up with their PCP within 2 weeks from discharge from the [facility].

## 2024-06-07 NOTE — THERAPY
Speech Language Pathology  Daily Treatment     Patient Name: Latanya Ferrera  Age:  58 y.o., Sex:  female  Medical Record #: [id number]  Today's Date: 6/7/2024     Precautions  Precautions: Fall Risk  Comments: R hemiplegia, R loss of sensation in hand, ICH    Subjective    Pt resting in bed at time of scheduled therapy. Encouraged patient to increase activity tolerance OOB, remaining up in w/c, however, pt is bed seeking, requesting to return to bed immediately following session.      Objective       06/07/24 1304   Treatment Charges   SLP Cognitive Skill Development First 15 Minutes 1   SLP Cognitive Skill Development Additional 15 Minutes 3   SLP Total Time Spent   SLP Individual Total Time Spent (Mins) 60   Cognition - Detailed   Cognitive-Linguistic (WDL) X   Verbal Short Term Memory Other (Comments)   Functional Problem Solving Supervision (5)   Interdisciplinary Plan of Care Collaboration   IDT Collaboration with  Physical Therapist   Patient Position at End of Therapy In Bed;Call Light within Reach;Tray Table within Reach;Phone within Reach   Collaboration Comments CLOF and POC         Assessment    Working memory: 10% IND, with 1 repetition increased to 60%. Pt reports she is able to recall information better in written format or when reading. Verbal problem solving as it pertains to d/c, pt verbalized appropriate solution and support person when given hypothetical safety scenario upon d/c with SPV. Ongoing education and encouragement to use RUE (pt frequently leaving limp by side, despite ability to move arm at this time).   Strengths: Motivated for self care and independence, Pleasant and cooperative, Willingly participates in therapeutic activities, Supportive family, Making steady progress towards goals, Alert and oriented, Effective communication skills  Barriers: Hemiparesis, Emotional lability, Impaired functional cognition    Plan    Use of memory notebook to be implemented over the weekend with staff  acting as scribe, complex attention, dual tasking in standing frame    Passport items to be completed:  Express basic needs, understand food/liquid recommendations, consistently follow swallow precautions, manage finances, manage medications, arrive to therapy appointments on time, complete daily memory log entries, solve problems related to safety situations, review education related to hospitalization, complete caregiver training     Speech Therapy Problems (Active)       Problem: Memory STGs       Dates: Start:  05/30/24         Goal: STG-Within one week, patient will demonstrate use of memory strategies in order to recall information from therapies after a 2/hr delay.        Dates: Start:  05/30/24         Goal Note filed on 06/03/24 1300 by Mya Steven MS,CCC-SLP       Will continue to target.                  Problem: Problem Solving STGs       Dates: Start:  05/30/24         Goal: STG-Within one week, patient will complete alternating attention tasks with 80% accuracy given SPV       Dates: Start:  05/30/24         Goal Note filed on 06/03/24 1300 by Mya Steven MS,CCC-SLP       Will continue to target.                  Problem: Speech/Swallowing LTGs       Dates: Start:  05/30/24         Goal: LTG-By discharge, patient will solve complex problems related to IADL's in order to d/c home with mod I        Dates: Start:  05/30/24

## 2024-06-08 PROCEDURE — A9270 NON-COVERED ITEM OR SERVICE: HCPCS | Performed by: PHYSICAL MEDICINE & REHABILITATION

## 2024-06-08 PROCEDURE — 770010 HCHG ROOM/CARE - REHAB SEMI PRIVAT*

## 2024-06-08 PROCEDURE — 700111 HCHG RX REV CODE 636 W/ 250 OVERRIDE (IP): Mod: JZ | Performed by: PHYSICAL MEDICINE & REHABILITATION

## 2024-06-08 PROCEDURE — 700102 HCHG RX REV CODE 250 W/ 637 OVERRIDE(OP): Performed by: PHYSICAL MEDICINE & REHABILITATION

## 2024-06-08 RX ADMIN — ACETAMINOPHEN 500 MG: 500 TABLET, FILM COATED ORAL at 05:29

## 2024-06-08 RX ADMIN — ENOXAPARIN SODIUM 40 MG: 100 INJECTION SUBCUTANEOUS at 17:58

## 2024-06-08 RX ADMIN — HYDRALAZINE HYDROCHLORIDE 75 MG: 50 TABLET ORAL at 14:28

## 2024-06-08 RX ADMIN — GABAPENTIN 400 MG: 400 CAPSULE ORAL at 20:18

## 2024-06-08 RX ADMIN — OXYCODONE 2.5 MG: 5 TABLET ORAL at 20:18

## 2024-06-08 RX ADMIN — BENZONATATE 100 MG: 100 CAPSULE ORAL at 20:18

## 2024-06-08 RX ADMIN — HYDRALAZINE HYDROCHLORIDE 75 MG: 50 TABLET ORAL at 08:51

## 2024-06-08 RX ADMIN — HYDRALAZINE HYDROCHLORIDE 75 MG: 50 TABLET ORAL at 20:18

## 2024-06-08 RX ADMIN — OMEPRAZOLE 20 MG: 20 CAPSULE, DELAYED RELEASE ORAL at 08:51

## 2024-06-08 RX ADMIN — BENZONATATE 100 MG: 100 CAPSULE ORAL at 08:51

## 2024-06-08 RX ADMIN — Medication 5000 UNITS: at 08:51

## 2024-06-08 RX ADMIN — BENZONATATE 100 MG: 100 CAPSULE ORAL at 14:28

## 2024-06-08 RX ADMIN — LISINOPRIL 60 MG: 20 TABLET ORAL at 05:28

## 2024-06-08 RX ADMIN — HYDRALAZINE HYDROCHLORIDE 75 MG: 50 TABLET ORAL at 17:58

## 2024-06-08 RX ADMIN — GABAPENTIN 400 MG: 400 CAPSULE ORAL at 08:51

## 2024-06-08 RX ADMIN — AMLODIPINE BESYLATE 10 MG: 5 TABLET ORAL at 05:28

## 2024-06-08 RX ADMIN — OXYCODONE 2.5 MG: 5 TABLET ORAL at 08:57

## 2024-06-08 RX ADMIN — ACETAMINOPHEN 500 MG: 500 TABLET, FILM COATED ORAL at 16:11

## 2024-06-08 RX ADMIN — GABAPENTIN 400 MG: 400 CAPSULE ORAL at 14:28

## 2024-06-08 NOTE — PROGRESS NOTES
NURSING DAILY NOTE    Name: Latanya Ferrera   Date of Admission: 5/29/2024   Admitting Diagnosis: Hemorrhagic stroke (HCC)  Attending Physician: Sofi Jules D.o.  Allergies: Patient has no known allergies.    Safety  Patient Assist  Mod Assist  Patient Precautions  Fall Risk  Precaution Comments  R hemiplegia, R loss of sensation in hand, ICH  Bed Transfer Status  Standby Assist  Toilet Transfer Status   Contact Guard Assist  Assistive Devices  Rails, Wheelchair  Oxygen  None - Room Air  Diet/Therapeutic Dining  Current Diet Order   Procedures    Diet Order Diet: Regular     Pill Administration  whole  Agitated Behavioral Scale     ABS Level of Severity       Fall Risk  Has the patient had a fall this admission?   No  Carly Fisher Fall Risk Scoring  17, HIGH RISK  Fall Risk Safety Measures  bed alarm, chair alarm, poor balance, and low vision/ hearing    Vitals  Temperature: 36.8 °C (98.2 °F)  Temp src: Oral  Pulse: 72  Respiration: 20  Blood Pressure: 126/68  Blood Pressure MAP (Calculated): 87 MM HG  BP Location: Left, Upper Arm  Patient BP Position: Supine     Oxygen  Pulse Oximetry: 95 %  O2 (LPM): 0  O2 Delivery Device: None - Room Air    Bowel and Bladder  Last Bowel Movement  06/08/24  Stool Type  Type 4: Like a sausage or snake, smooth and soft  Bowel Device  Bathroom  Continent  Bladder: Did not void   Bowel: No movement  Bladder Function  Urine Void (mL):  (Large)  Number of Times Voided: 1  Urinary Options: Yes  Urine Color: Yellow  Genitourinary Assessment   Bladder Assessment (WDL):  Within Defined Limits  Ramirez Catheter: Not Applicable  Ramirez Reasons per MD Order: Acute urinary retention or bladder outlet obstruction  Ramirez Care: Given with Soap and Water  Urinary Elimination: Catheter (Document on LDA)  Urine Color: Yellow  Bladder Device: Bathroom  Bladder Scan: Post Void  $ Bladder Scan Results (mL): 119  Bladder Medications:  Yes    Skin  Xavier Score   17  Sensory Interventions   Bed Types: Standard/Trauma Mattress  Skin Preventative Measures: Waffle Overlay  Moisture Interventions  Moisturizers/Barriers: Barrier Wipes  Containment Devices: Indwelling Catheter      Pain  Pain Rating Scale  3 - Sometimes distracts me  Pain Location  Leg, Shoulder  Pain Location Orientation  Right  Pain Interventions   Medication (see MAR)    ADLs    Bathing   Patient Refused Bathing (Family showered the other day as per patient)  Linen Change   Partial  Personal Hygiene     Chlorhexidine Bath      Oral Care  Brushed Teeth  Teeth/Dentures  Missing Teeth (Comments)  Shave     Nutrition Percentage Eaten  Breakfast, Between 50-75% Consumed  Environmental Precautions  Treaded Slipper Socks on Patient  Patient Turns/Positioning  Patient Turns Self from Side to Side  Patient Turns Assistance/Tolerance  Assistance of One, Tolerates Well  Bed Positions  Bed Controls On, Bed Locked  Head of Bed Elevated  Less than 30 degrees      Psychosocial/Neurologic Assessment  Psychosocial Assessment  Psychosocial (WDL):  WDL Except  Patient Behaviors: Fatigue  Family Behaviors: No Family Present  Neurologic Assessment  Neuro (WDL): Exceptions to WDL  Level of Consciousness: Alert  Orientation Level: Oriented X4  Cognition: Follows commands, Appropriate attention/concentration  Speech: Clear  Pupil Assesment: No  R Pupil Size (mm): 3  R Pupil Shape / Description: Round  R Pupil Reaction: Brisk  L Pupil Size (mm): 3  L Pupil Shape / Description: Round  L Pupil Reaction: Brisk  Motor Function/Sensation Assessment: Sensation, Motor strength  RUE Sensation: Numbness  Muscle Strength Right Arm: Fair Strength against Gravity but No Resistance  LUE Sensation: Full sensation  Muscle Strength Left Arm: Normal Strength Against Gravity and Full Resistance  RLE Sensation: Numbness  Muscle Strength Right Leg: Fair Strength against Gravity but No Resistance  LLE Sensation: Full  sensation  Muscle Strength Left Leg: Normal Strength Against Gravity and Full Resistance  EENT (WDL):  Within Defined Limits    Cardio/Pulmonary Assessment  Edema   RLE Edema: Trace  LLE Edema: Trace  Respiratory Breath Sounds  RUL Breath Sounds: Clear  RML Breath Sounds: Clear  RLL Breath Sounds: Clear  OZIEL Breath Sounds: Clear  LLL Breath Sounds: Clear  Cardiac Assessment   Cardiac (WDL):  WDL Except

## 2024-06-08 NOTE — PROGRESS NOTES
NURSING DAILY NOTE    Name: Latanya Ferrera   Date of Admission: 5/29/2024   Admitting Diagnosis: Hemorrhagic stroke (HCC)  Attending Physician: Sofi Jules D.o.  Allergies: Patient has no known allergies.    Safety  Patient Assist  Mod Assist  Patient Precautions  Fall Risk  Precaution Comments  R hemiplegia, R loss of sensation in hand, ICH  Bed Transfer Status  Standby Assist  Toilet Transfer Status   Contact Guard Assist  Assistive Devices  Rails, Wheelchair  Oxygen  None - Room Air  Diet/Therapeutic Dining  Current Diet Order   Procedures    Diet Order Diet: Regular     Pill Administration  whole  Agitated Behavioral Scale     ABS Level of Severity       Fall Risk  Has the patient had a fall this admission?   No  Carly Fisher Fall Risk Scoring  15, HIGH RISK  Fall Risk Safety Measures  bed alarm, chair alarm, and poor balance    Vitals  Temperature: 36.9 °C (98.4 °F)  Temp src: Oral  Pulse: 88  Respiration: 18  Blood Pressure: (!) 152/78  Blood Pressure MAP (Calculated): 103 MM HG  BP Location: Upper Arm, Right  Patient BP Position: Supine     Oxygen  Pulse Oximetry: 96 %  O2 (LPM): 0  O2 Delivery Device: None - Room Air    Bowel and Bladder  Last Bowel Movement  06/06/24  Stool Type  Type 4: Like a sausage or snake, smooth and soft  Bowel Device  Bathroom  Continent  Bladder: Did not void   Bowel: No movement  Bladder Function  Urine Void (mL):  (Large)  Number of Times Voided: 1  Urinary Options: Yes  Urine Color: Unable To Evaluate  Genitourinary Assessment   Bladder Assessment (WDL):  Within Defined Limits  Ramirez Catheter: Not Applicable  Ramirez Reasons per MD Order: Acute urinary retention or bladder outlet obstruction  Ramirez Care: Given with Soap and Water  Urinary Elimination: Catheter (Document on LDA)  Urine Color: Unable To Evaluate  Bladder Device: Bathroom  Bladder Scan: Post Void  $ Bladder Scan Results (mL): 44  Bladder Medications:  Yes    Skin  Xavier Score   17  Sensory Interventions   Bed Types: Standard/Trauma Mattress  Skin Preventative Measures: Waffle Overlay  Moisture Interventions  Moisturizers/Barriers: Barrier Wipes  Containment Devices: Indwelling Catheter      Pain  Pain Rating Scale  4 - Distracts me, can do usual activities  Pain Location  Leg, Shoulder  Pain Location Orientation  Right  Pain Interventions   Medication (see MAR)    ADLs    Bathing   Patient Refused Bathing (Family showered the other day as per patient)  Linen Change   Partial  Personal Hygiene     Chlorhexidine Bath      Oral Care  Brushed Teeth  Teeth/Dentures  Missing Teeth (Comments)  Shave     Nutrition Percentage Eaten  *  * Meal *  *, Breakfast, Between % Consumed  Environmental Precautions  Treaded Slipper Socks on Patient  Patient Turns/Positioning  Patient Turns Self from Side to Side  Patient Turns Assistance/Tolerance  Assistance of One  Bed Positions  Bed Controls On, Bed Locked  Head of Bed Elevated  Less than 30 degrees      Psychosocial/Neurologic Assessment  Psychosocial Assessment  Psychosocial (WDL):  WDL Except  Patient Behaviors: Fatigue  Family Behaviors: No Family Present  Neurologic Assessment  Neuro (WDL): Exceptions to WDL  Level of Consciousness: Alert  Orientation Level: Oriented X4  Cognition: Follows commands, Appropriate attention/concentration  Speech: Clear  Pupil Assesment: No  R Pupil Size (mm): 3  R Pupil Shape / Description: Round  R Pupil Reaction: Brisk  L Pupil Size (mm): 3  L Pupil Shape / Description: Round  L Pupil Reaction: Brisk  Motor Function/Sensation Assessment: Sensation, Motor strength  RUE Sensation: Numbness  Muscle Strength Right Arm: Fair Strength against Gravity but No Resistance  LUE Sensation: Full sensation  Muscle Strength Left Arm: Normal Strength Against Gravity and Full Resistance  RLE Sensation: Numbness  Muscle Strength Right Leg: Fair Strength against Gravity but No Resistance  LLE Sensation:  Full sensation  Muscle Strength Left Leg: Normal Strength Against Gravity and Full Resistance  EENT (WDL):  Within Defined Limits    Cardio/Pulmonary Assessment  Edema   RLE Edema: Trace  LLE Edema: Trace  Respiratory Breath Sounds  RUL Breath Sounds: Clear  RML Breath Sounds: Clear  RLL Breath Sounds: Clear  OZIEL Breath Sounds: Clear  LLL Breath Sounds: Clear  Cardiac Assessment   Cardiac (WDL):  WDL Except

## 2024-06-09 ENCOUNTER — APPOINTMENT (OUTPATIENT)
Dept: PHYSICAL THERAPY | Facility: REHABILITATION | Age: 59
DRG: 057 | End: 2024-06-09
Attending: PHYSICAL MEDICINE & REHABILITATION
Payer: MEDICAID

## 2024-06-09 ENCOUNTER — APPOINTMENT (OUTPATIENT)
Dept: SPEECH THERAPY | Facility: REHABILITATION | Age: 59
DRG: 057 | End: 2024-06-09
Attending: PHYSICAL MEDICINE & REHABILITATION
Payer: MEDICAID

## 2024-06-09 PROCEDURE — 97130 THER IVNTJ EA ADDL 15 MIN: CPT

## 2024-06-09 PROCEDURE — 97110 THERAPEUTIC EXERCISES: CPT | Mod: CQ

## 2024-06-09 PROCEDURE — 700102 HCHG RX REV CODE 250 W/ 637 OVERRIDE(OP): Performed by: PHYSICAL MEDICINE & REHABILITATION

## 2024-06-09 PROCEDURE — 700111 HCHG RX REV CODE 636 W/ 250 OVERRIDE (IP): Mod: JZ | Performed by: PHYSICAL MEDICINE & REHABILITATION

## 2024-06-09 PROCEDURE — 770010 HCHG ROOM/CARE - REHAB SEMI PRIVAT*

## 2024-06-09 PROCEDURE — A9270 NON-COVERED ITEM OR SERVICE: HCPCS | Performed by: PHYSICAL MEDICINE & REHABILITATION

## 2024-06-09 PROCEDURE — 97116 GAIT TRAINING THERAPY: CPT | Mod: CQ

## 2024-06-09 PROCEDURE — 97129 THER IVNTJ 1ST 15 MIN: CPT

## 2024-06-09 PROCEDURE — 97112 NEUROMUSCULAR REEDUCATION: CPT | Mod: CQ

## 2024-06-09 RX ADMIN — ALUMINUM HYDROXIDE, MAGNESIUM HYDROXIDE, AND DIMETHICONE 20 ML: 400; 400; 40 SUSPENSION ORAL at 18:33

## 2024-06-09 RX ADMIN — Medication 5000 UNITS: at 08:56

## 2024-06-09 RX ADMIN — BENZONATATE 100 MG: 100 CAPSULE ORAL at 08:57

## 2024-06-09 RX ADMIN — LISINOPRIL 60 MG: 20 TABLET ORAL at 05:06

## 2024-06-09 RX ADMIN — ACETAMINOPHEN 500 MG: 500 TABLET, FILM COATED ORAL at 05:07

## 2024-06-09 RX ADMIN — ACETAMINOPHEN 500 MG: 500 TABLET, FILM COATED ORAL at 07:11

## 2024-06-09 RX ADMIN — GABAPENTIN 400 MG: 400 CAPSULE ORAL at 20:51

## 2024-06-09 RX ADMIN — GABAPENTIN 400 MG: 400 CAPSULE ORAL at 08:57

## 2024-06-09 RX ADMIN — OXYCODONE 2.5 MG: 5 TABLET ORAL at 09:02

## 2024-06-09 RX ADMIN — HYDRALAZINE HYDROCHLORIDE 75 MG: 50 TABLET ORAL at 17:05

## 2024-06-09 RX ADMIN — GABAPENTIN 400 MG: 400 CAPSULE ORAL at 14:17

## 2024-06-09 RX ADMIN — OMEPRAZOLE 20 MG: 20 CAPSULE, DELAYED RELEASE ORAL at 08:57

## 2024-06-09 RX ADMIN — HYDRALAZINE HYDROCHLORIDE 75 MG: 50 TABLET ORAL at 14:17

## 2024-06-09 RX ADMIN — OXYCODONE 2.5 MG: 5 TABLET ORAL at 17:02

## 2024-06-09 RX ADMIN — AMLODIPINE BESYLATE 10 MG: 5 TABLET ORAL at 05:06

## 2024-06-09 RX ADMIN — HYDRALAZINE HYDROCHLORIDE 75 MG: 50 TABLET ORAL at 20:51

## 2024-06-09 RX ADMIN — BENZONATATE 100 MG: 100 CAPSULE ORAL at 20:52

## 2024-06-09 RX ADMIN — OXYCODONE 2.5 MG: 5 TABLET ORAL at 20:52

## 2024-06-09 RX ADMIN — ACETAMINOPHEN 500 MG: 500 TABLET, FILM COATED ORAL at 18:34

## 2024-06-09 RX ADMIN — BENZONATATE 100 MG: 100 CAPSULE ORAL at 14:17

## 2024-06-09 RX ADMIN — HYDRALAZINE HYDROCHLORIDE 75 MG: 50 TABLET ORAL at 08:57

## 2024-06-09 RX ADMIN — ENOXAPARIN SODIUM 40 MG: 100 INJECTION SUBCUTANEOUS at 17:06

## 2024-06-09 ASSESSMENT — GAIT ASSESSMENTS
GAIT LEVEL OF ASSIST: MINIMAL ASSIST
ASSISTIVE DEVICE: FRONT WHEEL WALKER

## 2024-06-09 ASSESSMENT — PAIN DESCRIPTION - PAIN TYPE
TYPE: ACUTE PAIN
TYPE: ACUTE PAIN

## 2024-06-09 ASSESSMENT — ACTIVITIES OF DAILY LIVING (ADL): TOILET_TRANSFER_DESCRIPTION: GRAB BAR;SET-UP OF EQUIPMENT;SUPERVISION FOR SAFETY;INCREASED TIME

## 2024-06-09 NOTE — PROGRESS NOTES
NURSING DAILY NOTE    Name: Latanya Ferrera   Date of Admission: 5/29/2024   Admitting Diagnosis: Hemorrhagic stroke (HCC)  Attending Physician: Sofi Jules D.o.  Allergies: Patient has no known allergies.    Safety  Patient Assist  Mod Assist  Patient Precautions  Fall Risk  Precaution Comments  R hemiplegia, R loss of sensation in hand, ICH  Bed Transfer Status  Standby Assist  Toilet Transfer Status   Contact Guard Assist  Assistive Devices  Wheelchair  Oxygen  None - Room Air  Diet/Therapeutic Dining  Current Diet Order   Procedures    Diet Order Diet: Regular     Pill Administration  whole  Agitated Behavioral Scale     ABS Level of Severity       Fall Risk  Has the patient had a fall this admission?   No  Carly Fisher Fall Risk Scoring  17, HIGH RISK  Fall Risk Safety Measures  bed alarm, poor balance, and low vision/ hearing    Vitals  Temperature: 36.7 °C (98 °F)  Temp src: Oral  Pulse: 85  Respiration: 18  Blood Pressure: 137/76  Blood Pressure MAP (Calculated): 96 MM HG  BP Location: Left, Upper Arm  Patient BP Position: Supine     Oxygen  Pulse Oximetry: 98 %  O2 (LPM): 0  O2 Delivery Device: None - Room Air    Bowel and Bladder  Last Bowel Movement  06/08/24  Stool Type  Type 4: Like a sausage or snake, smooth and soft  Bowel Device  Bathroom  Continent  Bladder: Did not void   Bowel: No movement  Bladder Function  Urine Void (mL):  (large)  Number of Times Voided: 1  Urinary Options: Yes  Urine Color: Yellow  Genitourinary Assessment   Bladder Assessment (WDL):  Within Defined Limits  Ramirez Catheter: Not Applicable  Ramirez Reasons per MD Order: Acute urinary retention or bladder outlet obstruction  Ramirez Care: Given with Soap and Water  Urinary Elimination: Catheter (Document on LDA)  Urine Color: Yellow  Bladder Device: Bathroom  Bladder Scan: Post Void  $ Bladder Scan Results (mL): 119  Bladder Medications: Yes    Skin  Xavier Score    17  Sensory Interventions   Bed Types: Standard/Trauma Mattress with Overlay  Skin Preventative Measures: Waffle Overlay  Moisture Interventions  Moisturizers/Barriers: Barrier Wipes  Containment Devices: Indwelling Catheter      Pain  Pain Rating Scale  3 - Sometimes distracts me  Pain Location  Leg, Shoulder  Pain Location Orientation  Right  Pain Interventions   Medication (see MAR)    ADLs    Bathing   Patient Refused Bathing (Family showered the other day as per patient)  Linen Change   Partial  Personal Hygiene     Chlorhexidine Bath      Oral Care  Brushed Teeth  Teeth/Dentures  Missing Teeth (Comments)  Shave     Nutrition Percentage Eaten  Lunch, Between 50-75% Consumed  Environmental Precautions  Treaded Slipper Socks on Patient, Personal Belongings, Wastebasket, Call Bell etc. in Easy Reach, Bed in Low Position  Patient Turns/Positioning  Patient Turns Self from Side to Side  Patient Turns Assistance/Tolerance  Assistance of One, Tolerates Well  Bed Positions  Bed Controls On, Bed Locked  Head of Bed Elevated  Less than 30 degrees      Psychosocial/Neurologic Assessment  Psychosocial Assessment  Psychosocial (WDL):  Within Defined Limits  Patient Behaviors: Fatigue  Family Behaviors: No Family Present  Neurologic Assessment  Neuro (WDL): Exceptions to WDL  Level of Consciousness: Alert  Orientation Level: Oriented X4  Cognition: Follows commands, Appropriate attention/concentration  Speech: Clear  Pupil Assesment: Yes  R Pupil Size (mm): 3  R Pupil Shape / Description: Round  R Pupil Reaction: Brisk  L Pupil Size (mm): 3  L Pupil Shape / Description: Round  L Pupil Reaction: Brisk  Motor Function/Sensation Assessment: Sensation, Motor strength  RUE Sensation: Numbness  Muscle Strength Right Arm: Fair Strength against Gravity but No Resistance  LUE Sensation: Full sensation  Muscle Strength Left Arm: Normal Strength Against Gravity and Full Resistance  RLE Sensation: Numbness  Muscle Strength Right Leg:  Fair Strength against Gravity but No Resistance  LLE Sensation: Full sensation  Muscle Strength Left Leg: Normal Strength Against Gravity and Full Resistance  EENT (WDL):  Within Defined Limits    Cardio/Pulmonary Assessment  Edema   RLE Edema: Trace  LLE Edema: Trace  Respiratory Breath Sounds  RUL Breath Sounds: Clear  RML Breath Sounds: Clear  RLL Breath Sounds: Clear  OZIEL Breath Sounds: Clear  LLL Breath Sounds: Clear  Cardiac Assessment   Cardiac (WDL):  WDL Except

## 2024-06-09 NOTE — PROGRESS NOTES
NURSING DAILY NOTE    Name: Latanya Ferrera   Date of Admission: 5/29/2024   Admitting Diagnosis: Hemorrhagic stroke (HCC)  Attending Physician: Sofi Jules D.o.  Allergies: Patient has no known allergies.    Safety  Patient Assist  Mod Assist  Patient Precautions  Fall Risk  Precaution Comments  R hemiplegia, R loss of sensation in hand, ICH  Bed Transfer Status  Contact Guard Assist  Toilet Transfer Status   Contact Guard Assist  Assistive Devices  Rails, Wheelchair  Oxygen  None - Room Air  Diet/Therapeutic Dining  Current Diet Order   Procedures    Diet Order Diet: Regular     Pill Administration  whole  Agitated Behavioral Scale     ABS Level of Severity       Fall Risk  Has the patient had a fall this admission?   No  Carly Fisher Fall Risk Scoring  17, HIGH RISK  Fall Risk Safety Measures  chair alarm, poor balance, and low vision/ hearing    Vitals  Temperature: 36.7 °C (98 °F)  Temp src: Oral  Pulse: 85  Respiration: 18  Blood Pressure: 129/69  Blood Pressure MAP (Calculated): 89 MM HG  BP Location: Left, Upper Arm  Patient BP Position: Supine     Oxygen  Pulse Oximetry: 98 %  O2 (LPM): 0  O2 Delivery Device: None - Room Air    Bowel and Bladder  Last Bowel Movement  06/09/24  Stool Type  Type 5: Soft blob with clear cut edges (passed easily)  Bowel Device  Bathroom  Continent  Bladder: Did not void   Bowel: No movement  Bladder Function  Urine Void (mL):  (large)  Number of Times Voided: 1  Urinary Options: Yes  Urine Color: Yellow  Genitourinary Assessment   Bladder Assessment (WDL):  Within Defined Limits  Ramirez Catheter: Not Applicable  Ramirez Reasons per MD Order: Acute urinary retention or bladder outlet obstruction  Ramirez Care: Given with Soap and Water  Urinary Elimination: Catheter (Document on LDA)  Urine Color: Yellow  Bladder Device: Bathroom  Bladder Scan: Post Void  $ Bladder Scan Results (mL): 119  Bladder Medications:  Yes    Skin  Xavier Score   17  Sensory Interventions   Bed Types: Standard/Trauma Mattress  Skin Preventative Measures: Waffle Overlay  Moisture Interventions  Moisturizers/Barriers: Barrier Wipes  Containment Devices: Indwelling Catheter      Pain  Pain Rating Scale  5 - Interrupts some activities  Pain Location  Knee  Pain Location Orientation  Right  Pain Interventions   Medication (see MAR)    ADLs    Bathing   Patient Refused Bathing (Family showered the other day as per patient)  Linen Change   Partial  Personal Hygiene     Chlorhexidine Bath      Oral Care  Brushed Teeth  Teeth/Dentures  Missing Teeth (Comments)  Shave     Nutrition Percentage Eaten  Lunch, Between 50-75% Consumed  Environmental Precautions  Treaded Slipper Socks on Patient, Personal Belongings, Wastebasket, Call Bell etc. in Easy Reach, Bed in Low Position  Patient Turns/Positioning  Patient Turns Self from Side to Side  Patient Turns Assistance/Tolerance  Assistance of One, Tolerates Well  Bed Positions  Bed Controls On, Bed Locked  Head of Bed Elevated  Less than 30 degrees      Psychosocial/Neurologic Assessment  Psychosocial Assessment  Psychosocial (WDL):  WDL Except  Patient Behaviors: Fatigue  Family Behaviors: No Family Present  Neurologic Assessment  Neuro (WDL): Exceptions to WDL  Level of Consciousness: Alert  Orientation Level: Oriented X4  Cognition: Follows commands, Appropriate attention/concentration  Speech: Clear  Pupil Assesment: No  R Pupil Size (mm): 3  R Pupil Shape / Description: Round  R Pupil Reaction: Brisk  L Pupil Size (mm): 3  L Pupil Shape / Description: Round  L Pupil Reaction: Brisk  Motor Function/Sensation Assessment: Sensation, Motor strength  RUE Sensation: Numbness  Muscle Strength Right Arm: Fair Strength against Gravity but No Resistance  LUE Sensation: Full sensation  Muscle Strength Left Arm: Normal Strength Against Gravity and Full Resistance  RLE Sensation: Numbness  Muscle Strength Right Leg: Fair  Strength against Gravity but No Resistance  LLE Sensation: Full sensation  Muscle Strength Left Leg: Normal Strength Against Gravity and Full Resistance  EENT (WDL):  Within Defined Limits    Cardio/Pulmonary Assessment  Edema   RLE Edema: Trace  LLE Edema: Trace  Respiratory Breath Sounds  RUL Breath Sounds: Clear  RML Breath Sounds: Clear  RLL Breath Sounds: Clear  OZIEL Breath Sounds: Clear  LLL Breath Sounds: Clear  Cardiac Assessment   Cardiac (WDL):  WDL Except

## 2024-06-09 NOTE — THERAPY
"Speech Language Pathology  Daily Treatment     Patient Name: Latanya Ferrera  Age:  58 y.o., Sex:  female  Medical Record #: [id number]  Today's Date: 6/9/2024     Precautions  Precautions: Fall Risk  Comments: R hemiplegia, R loss of sensation in hand, ICH    Subjective    Pt received for session laying in bed watching TV. Pt confused due to schedule change. Pt reported \"I don't have any problem with my memory.\"     Objective       06/09/24 1001   Treatment Charges   SLP Cognitive Skill Development First 15 Minutes 1   SLP Cognitive Skill Development Additional 15 Minutes 1   SLP Total Time Spent   SLP Individual Total Time Spent (Mins) 30   Cognition - Detailed   Attention to Task Supervision (5)   Interdisciplinary Plan of Care Collaboration   Patient Position at End of Therapy In Bed;Bed Alarm On;Call Light within Reach         Assessment    Pt tasked with mental manipulation tasks. Requested to see the words as \"I like to see things.\" Given quick visual presentation of 4 words (5 seconds) she was able to recall word position with 70% accuracy independently increasing to 90% given 1 verbal repetition. She was able to recall 100% independently given recall of objects by attribute. Discussed compensatory strategy of her visualizing the words written or creating a story with the words in order to recall them given only verbalization. Discussed trialing strategies at next session with use of verbal memory tasks.     Strengths: Motivated for self care and independence, Pleasant and cooperative, Willingly participates in therapeutic activities, Supportive family, Making steady progress towards goals, Alert and oriented, Effective communication skills  Barriers: Hemiparesis, Emotional lability, Impaired functional cognition    Plan    Reinforce use of memory log with SLP acting as scribe due to RUE. Verbal memory.    Passport items to be completed:  Express basic needs, understand food/liquid recommendations, consistently " follow swallow precautions, manage finances, manage medications, arrive to therapy appointments on time, complete daily memory log entries, solve problems related to safety situations, review education related to hospitalization, complete caregiver training     Speech Therapy Problems (Active)       Problem: Memory STGs       Dates: Start:  05/30/24         Goal: STG-Within one week, patient will demonstrate use of memory strategies in order to recall information from therapies after a 2/hr delay.        Dates: Start:  05/30/24         Goal Note filed on 06/03/24 1300 by Mya Steven MS,CCC-SLP       Will continue to target.                  Problem: Problem Solving STGs       Dates: Start:  05/30/24         Goal: STG-Within one week, patient will complete alternating attention tasks with 80% accuracy given SPV       Dates: Start:  05/30/24         Goal Note filed on 06/03/24 1300 by Mya Steven MS,CCC-SLP       Will continue to target.                  Problem: Speech/Swallowing LTGs       Dates: Start:  05/30/24         Goal: LTG-By discharge, patient will solve complex problems related to IADL's in order to d/c home with mod I        Dates: Start:  05/30/24

## 2024-06-09 NOTE — THERAPY
"Physical Therapy   Daily Treatment     Patient Name: Latanya Ferrera  Age:  58 y.o., Sex:  female  Medical Record #: [id number]  Today's Date: 6/9/2024     Precautions  Precautions: Fall Risk  Comments: R hemiplegia, R loss of sensation in hand, ICH    Subjective    Pt awake upon arrival, agreeable to PT     Objective       06/09/24 0701   PT Charge Group   PT Gait Training (Units) 2   PT Therapeutic Exercise (Units) 1   PT Neuromuscular Re-Education / Balance (Units) 1   Supervising Physical Therapist Raysa Murcia   PT Total Time Spent   PT Individual Total Time Spent (Mins) 60   Gait Functional Level of Assist    Gait Level Of Assist Minimal Assist   Assistive Device Front Wheel Walker  (R hand trough, DF ace wrap to R LE)   Distance (Feet)   (80', 60' + 30' x 2 at L hallway HR)   # of Times Distance was Traveled 2   Deviation Step To;Decreased Heel Strike;Decreased Toe Off;Shuffled Gait;Decreased Base Of Support  (poor R LE stance control, discontinuity with steps, poor walker control)   Transfer Functional Level of Assist   Bed, Chair, Wheelchair Transfer Contact Guard Assist   Bed Chair Wheelchair Transfer Description Squat pivot transfer to wheelchair;Increased time;Set-up of equipment;Supervision for safety;Verbal cueing  (poor eccentric control)   Toilet Transfers Contact Guard Assist   Toilet Transfer Description Grab bar;Set-up of equipment;Supervision for safety;Increased time   Standing Lower Body Exercises   Standing Lower Body Exercises Yes   Step Up 1 set of 10;Bilateral   Step Down 1 set of 10;Bilateral   Comments 1 x 10 stepping up to 6\" step leading L LE for R LE motor control/strengthening, 1 x 5 leading R LE onto 1st step and stepping through to 2nd step with L LE-CGAthroughout   Bed Mobility    Sit to Stand Contact Guard Assist   Neuro-Muscular Treatments   Neuro-Muscular Treatments Joint Approximation;Sequencing;Weight Shift Right   Comments forced wbing at bottom of 6\" stairs, B UE support, manual " "and verbal cues for R knee control   Interdisciplinary Plan of Care Collaboration   IDT Collaboration with  Nursing   Patient Position at End of Therapy Seated;Call Light within Reach;Other (Comments)  (on toilet with call light)   Collaboration Comments medicated pt with tylenol at beg of tx, notified RN pt on toilet at end of session       two #2.5lb weights added to walker and R hand trough during gait training, Javed to manage walker    Assessment    Continues to present with poor R LE quad control and hyper extension thrust in stance. Unable to trial posterior AFO as pt's dght took her shoes to look for a larger size, pt used DF ace wrap this session  Strengths: Able to follow instructions, Alert and oriented, Independent prior level of function, Motivated for self care and independence, Pleasant and cooperative, Supportive family, Willingly participates in therapeutic activities  Barriers: Decreased endurance, Difficulty following instructions, Fatigue, Hemiplegia, Home accessibility, Hypertension, Impaired balance, Impaired carryover of learning, Limited mobility (lives alone, decreased knowledge of condition, limited daytime family support)    Plan    Trial posterior leaf AFO  Ambulation w/ FWW v.s jaciel walker + black anterior shell AFO  NMRE RLE  RLE Strengthening and endurance  Mat program  Standing balance  Shuttle for knee control  Aquatic therapy when appropriate    DME  PT DME Recommendations  Wheelchair: 18\" Width, Jaciel Height (16\"-17\"), Lightweight, Removable/Flip Back Armrests, Standard Leg Rests, Anti-tippers  Cushion: Standard  Assistive Device: Front Wheeled Walker, Other (Comments) (TBD)  Additional Equipment: Other (Comments) (TBD)    Passport items to be completed:  Get in/out of bed safely, in/out of a vehicle, safely use mobility device, walk or wheel around home/community, navigate up and down stairs, show how to get up/down from the ground, ensure home is accessible, demonstrate HEP, " complete caregiver training    Physical Therapy Problems (Active)       Problem: Balance       Dates: Start:  05/30/24         Goal: STG-Within one week, patient will maintain static standing c/ 1UE support at SBA or better, >/= 2 minutes.        Dates: Start:  05/30/24               Problem: Mobility       Dates: Start:  05/30/24         Goal: STG-Within one week, patient will ambulate household distances >/= 50' c/ LRAD and TotalA or better.        Dates: Start:  05/30/24               Problem: Mobility Transfers       Dates: Start:  05/30/24         Goal: STG-Within one week, patient will transfer bed to chair c/ Sonia or better c/ appropriate compensatory method (reach pivot vs SPT).       Dates: Start:  05/30/24               Problem: PT-Long Term Goals       Dates: Start:  05/30/24         Goal: LTG-By discharge, patient will ambulate >/= 300' c/ LRAD and CGA or better.        Dates: Start:  05/30/24            Goal: LTG-By discharge, patient will transfer one surface to another c/ Boni.        Dates: Start:  05/30/24            Goal: LTG-By discharge, patient will ambulate up/down 2 stairs c/ CGA or better to access home.        Dates: Start:  05/30/24            Goal: LTG-By discharge, patient will transfer in/out of a car c/ CGA or better.        Dates: Start:  05/30/24

## 2024-06-10 ENCOUNTER — APPOINTMENT (OUTPATIENT)
Dept: PHYSICAL THERAPY | Facility: REHABILITATION | Age: 59
DRG: 057 | End: 2024-06-10
Attending: PHYSICAL MEDICINE & REHABILITATION
Payer: MEDICAID

## 2024-06-10 ENCOUNTER — APPOINTMENT (OUTPATIENT)
Dept: OCCUPATIONAL THERAPY | Facility: REHABILITATION | Age: 59
DRG: 057 | End: 2024-06-10
Attending: PHYSICAL MEDICINE & REHABILITATION
Payer: MEDICAID

## 2024-06-10 ENCOUNTER — APPOINTMENT (OUTPATIENT)
Dept: SPEECH THERAPY | Facility: REHABILITATION | Age: 59
DRG: 057 | End: 2024-06-10
Attending: PHYSICAL MEDICINE & REHABILITATION
Payer: MEDICAID

## 2024-06-10 LAB
ALBUMIN SERPL BCP-MCNC: 3.7 G/DL (ref 3.2–4.9)
ALBUMIN/GLOB SERPL: 1.3 G/DL
ALP SERPL-CCNC: 85 U/L (ref 30–99)
ALT SERPL-CCNC: 44 U/L (ref 2–50)
ANION GAP SERPL CALC-SCNC: 13 MMOL/L (ref 7–16)
AST SERPL-CCNC: 21 U/L (ref 12–45)
BASOPHILS # BLD AUTO: 0.4 % (ref 0–1.8)
BASOPHILS # BLD: 0.04 K/UL (ref 0–0.12)
BILIRUB SERPL-MCNC: 0.3 MG/DL (ref 0.1–1.5)
BUN SERPL-MCNC: 16 MG/DL (ref 8–22)
CALCIUM ALBUM COR SERPL-MCNC: 9.1 MG/DL (ref 8.5–10.5)
CALCIUM SERPL-MCNC: 8.9 MG/DL (ref 8.5–10.5)
CHLORIDE SERPL-SCNC: 106 MMOL/L (ref 96–112)
CO2 SERPL-SCNC: 21 MMOL/L (ref 20–33)
CREAT SERPL-MCNC: 0.63 MG/DL (ref 0.5–1.4)
EOSINOPHIL # BLD AUTO: 0.36 K/UL (ref 0–0.51)
EOSINOPHIL NFR BLD: 3.4 % (ref 0–6.9)
ERYTHROCYTE [DISTWIDTH] IN BLOOD BY AUTOMATED COUNT: 45.1 FL (ref 35.9–50)
GFR SERPLBLD CREATININE-BSD FMLA CKD-EPI: 104 ML/MIN/1.73 M 2
GLOBULIN SER CALC-MCNC: 2.9 G/DL (ref 1.9–3.5)
GLUCOSE SERPL-MCNC: 106 MG/DL (ref 65–99)
HCT VFR BLD AUTO: 34.3 % (ref 37–47)
HGB BLD-MCNC: 10.5 G/DL (ref 12–16)
IMM GRANULOCYTES # BLD AUTO: 0.12 K/UL (ref 0–0.11)
IMM GRANULOCYTES NFR BLD AUTO: 1.1 % (ref 0–0.9)
LYMPHOCYTES # BLD AUTO: 1.37 K/UL (ref 1–4.8)
LYMPHOCYTES NFR BLD: 12.9 % (ref 22–41)
MCH RBC QN AUTO: 28.8 PG (ref 27–33)
MCHC RBC AUTO-ENTMCNC: 30.6 G/DL (ref 32.2–35.5)
MCV RBC AUTO: 94.2 FL (ref 81.4–97.8)
MONOCYTES # BLD AUTO: 0.75 K/UL (ref 0–0.85)
MONOCYTES NFR BLD AUTO: 7 % (ref 0–13.4)
NEUTROPHILS # BLD AUTO: 8.02 K/UL (ref 1.82–7.42)
NEUTROPHILS NFR BLD: 75.2 % (ref 44–72)
NRBC # BLD AUTO: 0 K/UL
NRBC BLD-RTO: 0 /100 WBC (ref 0–0.2)
PLATELET # BLD AUTO: 285 K/UL (ref 164–446)
PMV BLD AUTO: 10.4 FL (ref 9–12.9)
POTASSIUM SERPL-SCNC: 4 MMOL/L (ref 3.6–5.5)
PROT SERPL-MCNC: 6.6 G/DL (ref 6–8.2)
RBC # BLD AUTO: 3.64 M/UL (ref 4.2–5.4)
SODIUM SERPL-SCNC: 140 MMOL/L (ref 135–145)
WBC # BLD AUTO: 10.7 K/UL (ref 4.8–10.8)

## 2024-06-10 PROCEDURE — 80053 COMPREHEN METABOLIC PANEL: CPT

## 2024-06-10 PROCEDURE — 99232 SBSQ HOSP IP/OBS MODERATE 35: CPT | Performed by: PHYSICAL MEDICINE & REHABILITATION

## 2024-06-10 PROCEDURE — A9270 NON-COVERED ITEM OR SERVICE: HCPCS | Performed by: PHYSICAL MEDICINE & REHABILITATION

## 2024-06-10 PROCEDURE — 36415 COLL VENOUS BLD VENIPUNCTURE: CPT

## 2024-06-10 PROCEDURE — 85025 COMPLETE CBC W/AUTO DIFF WBC: CPT

## 2024-06-10 PROCEDURE — 97535 SELF CARE MNGMENT TRAINING: CPT

## 2024-06-10 PROCEDURE — 700111 HCHG RX REV CODE 636 W/ 250 OVERRIDE (IP): Mod: JZ | Performed by: PHYSICAL MEDICINE & REHABILITATION

## 2024-06-10 PROCEDURE — 97129 THER IVNTJ 1ST 15 MIN: CPT

## 2024-06-10 PROCEDURE — 97530 THERAPEUTIC ACTIVITIES: CPT

## 2024-06-10 PROCEDURE — 770010 HCHG ROOM/CARE - REHAB SEMI PRIVAT*

## 2024-06-10 PROCEDURE — 97130 THER IVNTJ EA ADDL 15 MIN: CPT

## 2024-06-10 PROCEDURE — 700102 HCHG RX REV CODE 250 W/ 637 OVERRIDE(OP): Performed by: PHYSICAL MEDICINE & REHABILITATION

## 2024-06-10 PROCEDURE — 97112 NEUROMUSCULAR REEDUCATION: CPT

## 2024-06-10 PROCEDURE — 97116 GAIT TRAINING THERAPY: CPT

## 2024-06-10 RX ORDER — LISINOPRIL 20 MG/1
80 TABLET ORAL
Status: DISCONTINUED | OUTPATIENT
Start: 2024-06-11 | End: 2024-06-20 | Stop reason: HOSPADM

## 2024-06-10 RX ADMIN — ACETAMINOPHEN 500 MG: 500 TABLET, FILM COATED ORAL at 07:42

## 2024-06-10 RX ADMIN — OMEPRAZOLE 20 MG: 20 CAPSULE, DELAYED RELEASE ORAL at 07:43

## 2024-06-10 RX ADMIN — GABAPENTIN 400 MG: 400 CAPSULE ORAL at 20:09

## 2024-06-10 RX ADMIN — HYDRALAZINE HYDROCHLORIDE 75 MG: 50 TABLET ORAL at 20:09

## 2024-06-10 RX ADMIN — GABAPENTIN 400 MG: 400 CAPSULE ORAL at 07:44

## 2024-06-10 RX ADMIN — OXYCODONE 2.5 MG: 5 TABLET ORAL at 10:26

## 2024-06-10 RX ADMIN — ACETAMINOPHEN 500 MG: 500 TABLET, FILM COATED ORAL at 17:55

## 2024-06-10 RX ADMIN — OXYCODONE 2.5 MG: 5 TABLET ORAL at 20:09

## 2024-06-10 RX ADMIN — LISINOPRIL 60 MG: 20 TABLET ORAL at 05:16

## 2024-06-10 RX ADMIN — BENZONATATE 100 MG: 100 CAPSULE ORAL at 07:43

## 2024-06-10 RX ADMIN — ACETAMINOPHEN 500 MG: 500 TABLET, FILM COATED ORAL at 14:11

## 2024-06-10 RX ADMIN — Medication 5000 UNITS: at 07:43

## 2024-06-10 RX ADMIN — BENZONATATE 100 MG: 100 CAPSULE ORAL at 14:11

## 2024-06-10 RX ADMIN — HYDRALAZINE HYDROCHLORIDE 75 MG: 50 TABLET ORAL at 12:17

## 2024-06-10 RX ADMIN — GABAPENTIN 400 MG: 400 CAPSULE ORAL at 14:11

## 2024-06-10 RX ADMIN — ENOXAPARIN SODIUM 40 MG: 100 INJECTION SUBCUTANEOUS at 17:07

## 2024-06-10 RX ADMIN — BENZONATATE 100 MG: 100 CAPSULE ORAL at 20:09

## 2024-06-10 RX ADMIN — AMLODIPINE BESYLATE 10 MG: 5 TABLET ORAL at 05:16

## 2024-06-10 RX ADMIN — HYDRALAZINE HYDROCHLORIDE 75 MG: 50 TABLET ORAL at 17:07

## 2024-06-10 RX ADMIN — HYDRALAZINE HYDROCHLORIDE 75 MG: 50 TABLET ORAL at 07:43

## 2024-06-10 ASSESSMENT — GAIT ASSESSMENTS
GAIT LEVEL OF ASSIST: MINIMAL ASSIST
ASSISTIVE DEVICE: FRONT WHEEL WALKER
DEVIATION: STEP TO;DECREASED BASE OF SUPPORT;DECREASED HEEL STRIKE;DECREASED TOE OFF
DISTANCE (FEET): 100

## 2024-06-10 ASSESSMENT — PAIN DESCRIPTION - PAIN TYPE: TYPE: ACUTE PAIN

## 2024-06-10 ASSESSMENT — ACTIVITIES OF DAILY LIVING (ADL)
BED_CHAIR_WHEELCHAIR_TRANSFER_DESCRIPTION: SET-UP OF EQUIPMENT;SUPERVISION FOR SAFETY;VERBAL CUEING
BED_CHAIR_WHEELCHAIR_TRANSFER_DESCRIPTION: OTHER (COMMENT)

## 2024-06-10 NOTE — THERAPY
Speech Language Pathology  Daily Treatment     Patient Name: Latanya Ferrera  Age:  58 y.o., Sex:  female  Medical Record #: [id number]  Today's Date: 6/10/2024     Precautions  Precautions: Fall Risk  Comments: R hemiplegia, R loss of sensation in hand, ICH    Subjective    Pt reports walking today without AD, very pleased with her progress.      Objective       06/10/24 1004   Treatment Charges   SLP Cognitive Skill Development First 15 Minutes 1   SLP Cognitive Skill Development Additional 15 Minutes 3   SLP Total Time Spent   SLP Individual Total Time Spent (Mins) 60   Interdisciplinary Plan of Care Collaboration   IDT Collaboration with  Physical Therapist   Patient Position at End of Therapy In Bed;Call Light within Reach;Bed Alarm On;Tray Table within Reach;Phone within Reach   Collaboration Comments CLOF and AD for d/c         Assessment    Visual attention task x4 trials. 1st trial: 75%, 2nd trial: 100%, 3rd trial: 100%, 4th trial: 80%  If pt was able to review picture a second time increased to 100% on second attempt.     Strengths: Able to follow instructions, Alert and oriented, Effective communication skills, Motivated for self care and independence, Pleasant and cooperative, Willingly participates in therapeutic activities, Supportive family, Making steady progress towards goals  Barriers: Hemiparesis, Decreased endurance    Plan    Continue to target attention, memory    Passport items to be completed:  manage finances, manage medications, arrive to therapy appointments on time, complete daily memory log entries, solve problems related to safety situations, review education related to hospitalization, complete caregiver training     Speech Therapy Problems (Active)       Problem: Memory STGs       Dates: Start:  05/30/24         Goal: STG-Within one week, patient will demonstrate use of memory strategies in order to recall information from therapies after a 2/hr delay.        Dates: Start:  05/30/24          Goal Note filed on 06/10/24 1145 by Mya Steven MS,CCC-SLP       Pt continues to benefit from repetitions and reinforcement for novel information                 Problem: Speech/Swallowing LTGs       Dates: Start:  05/30/24         Goal: LTG-By discharge, patient will solve complex problems related to IADL's in order to d/c home with mod I        Dates: Start:  05/30/24

## 2024-06-10 NOTE — THERAPY
Occupational Therapy  Daily Treatment     Patient Name: Latanya Ferrera  Age:  58 y.o., Sex:  female  Medical Record #: [id number]  Today's Date: 6/10/2024     Precautions  Precautions: (P) Fall Risk  Comments: R hemiplegia, R loss of sensation in hand, ICH         Subjective    Patient was napping in bed, but easily awoken.  She was agreeable to OT.  She was happy about how she performed with walking this morning.       Objective       06/10/24 1231   OT Charge Group   OT Self Care / ADL (Units) 1   OT Therapy Activity (Units) 3   OT Total Time Spent   OT Individual Total Time Spent (Mins) 60   Precautions   Precautions Fall Risk   Functional Level of Assist   Bed, Chair, Wheelchair Transfer Standby Assist   Bed Chair Wheelchair Transfer Description Set-up of equipment;Supervision for safety;Verbal cueing   IADL Treatments   IADL Treatments Kitchen mobility education;Meal preparation;Home management   Kitchen Mobility Education Patient mobilized around kitchen via sidestepping using counter for support with CGA.  She gathered all needed items to make oatmeal.   Meal Preparation Used stove to make oatmeal with CGA.  Did not need any cues for using correct burner or temperature and she remembered to turn stove off when done.  She did require several verbal cues to attend to where R hand was in relation to hot pot, especially since she has impaired sensation in R UE.   Home Management Rinsed dishes with CGA while standing at the sink.  Completed simulated laundry task of gathering items from kitchen counter seated in w/c and transporting over to washing machine.  She then stood up, placed items in washing machine, removed them and transferred them to the dryer, removed them and stood to fold, all with CGA/SBA.  Required cues to incorporate R UE more often.   Bed Mobility    Supine to Sit Supervised   Sit to Supine Supervised   Scooting Standby Assist   Interdisciplinary Plan of Care Collaboration   Patient Position at End  of Therapy In Bed;Bed Alarm On;Call Light within Reach;Tray Table within Reach;Phone within Reach   Putting On/Taking Off Footwear   Assistance Needed Independent   Comment don/doff socks   Toilet Transfer   Assistance Needed Set-up / clean-up;Supervision;Adaptive equipment     Seated and standing (CGA) to copy a picture design with pipes and connecting pieces using unilateral L and R UE's, as well as bilateral integration when appropriate.      Assessment    Patient continues to require cues/reminders to incorporate R UE more often into daily and therapeutic tasks.  She continues to improve in adls and iadls.  R knee hyperextends at times in standing.  Strengths: Able to follow instructions, Good insight into deficits/needs, Independent prior level of function, Manages pain appropriately, Motivated for self care and independence, Pleasant and cooperative, Supportive family, Willingly participates in therapeutic activities  Barriers: Decreased endurance, Hemiparesis, Home accessibility, Impaired activity tolerance, Impaired balance (Impaired coordination/motor control)    Plan    ADLs, safety with transfers, neuro re-ed R UE, standing tolerance/balance,  3x/week with therapy techs for adjunct therapy     DME  OT DME Recommendations  Bathroom Equipment: Tub Transfer Bench (Medicaid Only)  Additional Equipment: Other (Comments) (TBD)    Occupational Therapy Goals (Active)       Problem: Bathing       Dates: Start:  05/30/24         Goal: STG-Within one week, patient will bathe with MIN A and LRAD       Dates: Start:  05/30/24         Goal Note filed on 06/10/24 0943 by Fox Castro, OT/L       Has refused all bathing during OT; therefore not assessed                 Problem: Dressing       Dates: Start:  06/10/24         Goal: STG-Within one week, patient will dress LB with setup and supervised with grab bar for balance       Dates: Start:  06/10/24               Problem: Functional Transfers       Dates:  Start:  06/10/24         Goal: STG-Within one week, patient will transfer to toilet with supervision using grab bar for support       Dates: Start:  06/10/24               Problem: OT Long Term Goals       Dates: Start:  05/30/24         Goal: LTG-By discharge, patient will complete basic self care tasks with SPV-MOD I and LRAD       Dates: Start:  05/30/24            Goal: LTG-By discharge, patient will perform bathroom transfers with SPV-MOD I and LRAD       Dates: Start:  05/30/24               Problem: Toileting       Dates: Start:  06/10/24         Goal: STG-Within one week, patient will complete toileting tasks with supervision using grab bar for balance       Dates: Start:  06/10/24

## 2024-06-10 NOTE — THERAPY
"Physical Therapy   Daily Treatment     Patient Name: Latanya Ferrera  Age:  58 y.o., Sex:  female  Medical Record #: [id number]  Today's Date: 6/10/2024     Precautions  Precautions: Fall Risk  Comments: R hemiplegia, R loss of sensation in hand, ICH    Subjective    \"I want to walk.\" Pt in bed at arrival, agreeable to PT tx.      Objective       06/10/24 0831   PT Charge Group   PT Gait Training (Units) 2   PT Neuromuscular Re-Education / Balance (Units) 2   PT Total Time Spent   PT Individual Total Time Spent (Mins) 60   Gait Functional Level of Assist    Gait Level Of Assist Minimal Assist   Assistive Device Front Wheel Walker   Distance (Feet) 100   # of Times Distance was Traveled 1  (1 x 90' (45 for/back) c/ LHR, Sonia, 1 x 80' no device @ Min<>ModA)   Deviation Step To;Decreased Base Of Support;Decreased Heel Strike;Decreased Toe Off  (step to vs step through, poor rhythm/rita (step, step, pause))   Wheelchair Functional Level of Assist   Wheelchair Assist Supervised   Distance Wheelchair (Feet or Distance) 50   Wheelchair Description Extra time;Leg rest management;Supervision for safety   Transfer Functional Level of Assist   Bed, Chair, Wheelchair Transfer Standby Assist   Bed Chair Wheelchair Transfer Description Other (comment)  (stand pivot bed<>w/c; CGA for stand step to bed)   Bed Mobility    Supine to Sit Supervised   Sit to Supine Supervised   Sit to Stand Standby Assist   Scooting Supervised   Rolling Supervised   Neuro-Muscular Treatments   Neuro-Muscular Treatments Anterior weight shift;Co-Contraction;Postural Facilitation;Tactile Cuing;Sequencing;Verbal Cuing;Weight Shift Left;Weight Shift Right   Comments see note   Interdisciplinary Plan of Care Collaboration   IDT Collaboration with  Physical Therapist Assistant (PTA);Family / Caregiver   Patient Position at End of Therapy In Bed;Bed Alarm On;Call Light within Reach;Tray Table within Reach;Phone within Reach   Collaboration Comments POC, shoes " "  PT DME Recommendations   Wheelchair 18\" Width;Jaciel Height (16\"-17\");Lightweight;Removable/Flip Back Armrests;Standard Leg Rests;Anti-tippers   Cushion Standard   Assistive Device Other (Comments)  (TBD)   Physical Therapist Assigned   Assigned PT / Treatment Time / Comments Christina. 60-90   Walk 10 Feet   Assistance Needed Physical assistance   Physical Assistance Level 26%-50%   CARE Score - Walk 10 Feet 3   Walk 50 Feet with Two Turns   Assistance Needed Physical assistance   Physical Assistance Level 26%-50%   CARE Score - Walk 50 Feet with Two Turns 3   Walk 150 Feet   Assistance Needed Physical assistance   Physical Assistance Level Total assistance   CARE Score - Walk 150 Feet 1     NMRE/Gait: level surfaces  1 x 45' forward, 1 x 45' back, cue at RLE for stance control/hyperextension, reciprocal step length, anterior and posterior WS onto RLE in stance  1 x 100' c/ FWW, cues for reciprocal step pattern, FWW management, step width, and support at RUE for hand contact c/ FWW  1 x 80' no AD, cues for reciprocal pattern c/ metronome at 50 bpm   Pregait: stance control, rita/rhythm c/ metronome for rhythmic auditory stimulation (50-52 bpm); facilitation of RLE stance control c/ BTB behind knee for anterior cue, mirror and floor targets to cue symmetry of step length/width and midline control during stepping, 1 to no UE support over efforts  SL stance c/ unilateral stepping in place, no UE support 1 x 10 each  A-P stepping to targets, stride stance, 1 LE fixed in stance, 1 x 2 min each  Lateral stepping to targets, 1 LE fixed in stance, 1 x 2 min each, then alt 1 x 2 min  Seated rest breaks btw therapy actvities.    Spoke c/ pt's daughter to request walking shoes for continued gait training.     Assessment    Latanya with improving stance control over progressive gait component training, with carryover to gait without AD at end of session c/ use of metronome and VC. Limited by poor weight acceptance on RLE in " "loading response and B trendelenberg, functional stance stability in B hips. Responds well to metronome and mirror for feedback on rita and midline control. Would benefit from continued metronome and TM training.     Strengths: Able to follow instructions, Alert and oriented, Independent prior level of function, Motivated for self care and independence, Pleasant and cooperative, Supportive family, Willingly participates in therapeutic activities  Barriers: Decreased endurance, Difficulty following instructions, Fatigue, Hemiplegia, Home accessibility, Hypertension, Impaired balance, Impaired carryover of learning, Limited mobility (lives alone, decreased knowledge of condition, limited daytime family support)    Plan    TM gait training c/ metronome for rhythm/rita  Pregait to gait with dec UE support- stepping and weight acceptance/stance stability  Push/pulls c/ resistance   Ankle extrinsic strength and coordination  Functional endurance and OOB time   FT on 6/12 c/ pt and daughter     DME  PT DME Recommendations  Wheelchair: 18\" Width, Jaciel Height (16\"-17\"), Lightweight, Removable/Flip Back Armrests, Standard Leg Rests, Anti-tippers  Cushion: Standard  Assistive Device: Other (Comments) (TBD)  Additional Equipment: Other (Comments) (TBD)    Passport items to be completed:  Get in/out of bed safely, in/out of a vehicle, safely use mobility device, walk or wheel around home/community, navigate up and down stairs, show how to get up/down from the ground, ensure home is accessible, demonstrate HEP, complete caregiver training    Physical Therapy Problems (Active)       Problem: PT-Long Term Goals       Dates: Start:  05/30/24         Goal: LTG-By discharge, patient will ambulate >/= 300' c/ LRAD and CGA or better.        Dates: Start:  05/30/24            Goal: LTG-By discharge, patient will transfer one surface to another c/ Boni.        Dates: Start:  05/30/24            Goal: LTG-By discharge, patient will " ambulate up/down 2 stairs c/ CGA or better to access home.        Dates: Start:  05/30/24            Goal: LTG-By discharge, patient will transfer in/out of a car c/ CGA or better.        Dates: Start:  05/30/24

## 2024-06-10 NOTE — PROGRESS NOTES
NURSING DAILY NOTE    Name: Latanya Ferrera   Date of Admission: 5/29/2024   Admitting Diagnosis: Hemorrhagic stroke (HCC)  Attending Physician: Sofi Jules D.o.  Allergies: Pineapple    Safety  Patient Assist  Mod Assist  Patient Precautions  Fall Risk  Precaution Comments  R hemiplegia, R loss of sensation in hand, ICH  Bed Transfer Status  Contact Guard Assist  Toilet Transfer Status   Contact Guard Assist  Assistive Devices  Wheelchair  Oxygen  None - Room Air  Diet/Therapeutic Dining  Current Diet Order   Procedures    Diet Order Diet: Regular     Pill Administration  whole  Agitated Behavioral Scale     ABS Level of Severity       Fall Risk  Has the patient had a fall this admission?   No  Carly Fisher Fall Risk Scoring  15, HIGH RISK  Fall Risk Safety Measures  bed alarm, chair alarm, and poor balance    Vitals  Temperature: 36.4 °C (97.6 °F)  Temp src: Oral  Pulse: 89  Respiration: 18  Blood Pressure: 135/79  Blood Pressure MAP (Calculated): 98 MM HG  BP Location: Left, Upper Arm  Patient BP Position: Supine     Oxygen  Pulse Oximetry: 97 %  O2 (LPM): 0  O2 Delivery Device: None - Room Air    Bowel and Bladder  Last Bowel Movement  06/09/24  Stool Type  Type 5: Soft blob with clear cut edges (passed easily)  Bowel Device  Bathroom  Continent  Bladder: Did not void   Bowel: No movement  Bladder Function  Urine Void (mL):  (large)  Number of Times Voided: 1  Urinary Options: Yes  Urine Color: Yellow  Genitourinary Assessment   Bladder Assessment (WDL):  Within Defined Limits  Ramirez Catheter: Not Applicable  Ramirez Reasons per MD Order: Acute urinary retention or bladder outlet obstruction  Ramirez Care: Given with Soap and Water  Urinary Elimination: Catheter (Document on LDA)  Urine Color: Yellow  Bladder Device: Bathroom  Bladder Scan: Post Void  $ Bladder Scan Results (mL): 100  Bladder Medications: Yes    Skin  Xavier Score   17  Sensory  Interventions   Bed Types: Standard/Trauma Mattress  Skin Preventative Measures: Waffle Overlay  Moisture Interventions  Moisturizers/Barriers: Barrier Wipes  Containment Devices: Indwelling Catheter      Pain  Pain Rating Scale  10 - As bad as it could be, nothing else matters  Pain Location  Leg  Pain Location Orientation  Right  Pain Interventions   Medication (see MAR)    ADLs    Bathing   Patient Refused Bathing (Family showered the other day as per patient)  Linen Change   Partial  Personal Hygiene     Chlorhexidine Bath      Oral Care  Brushed Teeth  Teeth/Dentures  Missing Teeth (Comments)  Shave     Nutrition Percentage Eaten  Lunch, Between 50-75% Consumed  Environmental Precautions  Treaded Slipper Socks on Patient, Personal Belongings, Wastebasket, Call Bell etc. in Easy Reach, Bed in Low Position  Patient Turns/Positioning  Patient Turns Self from Side to Side  Patient Turns Assistance/Tolerance  Assistance of One, Tolerates Well  Bed Positions  Bed Controls On, Bed Locked  Head of Bed Elevated  Less than 30 degrees      Psychosocial/Neurologic Assessment  Psychosocial Assessment  Psychosocial (WDL):  WDL Except  Patient Behaviors: Fatigue  Family Behaviors: No Family Present  Neurologic Assessment  Neuro (WDL): Exceptions to WDL  Level of Consciousness: Alert  Orientation Level: Oriented X4  Cognition: Follows commands, Appropriate attention/concentration  Speech: Clear  Pupil Assesment: No  R Pupil Size (mm): 3  R Pupil Shape / Description: Round  R Pupil Reaction: Brisk  L Pupil Size (mm): 3  L Pupil Shape / Description: Round  L Pupil Reaction: Brisk  Motor Function/Sensation Assessment: Sensation, Motor strength  RUE Sensation: Numbness  Muscle Strength Right Arm: Fair Strength against Gravity but No Resistance  LUE Sensation: Full sensation  Muscle Strength Left Arm: Normal Strength Against Gravity and Full Resistance  RLE Sensation: Numbness  Muscle Strength Right Leg: Fair Strength against  Gravity but No Resistance  LLE Sensation: Full sensation  Muscle Strength Left Leg: Normal Strength Against Gravity and Full Resistance  EENT (WDL):  Within Defined Limits    Cardio/Pulmonary Assessment  Edema   RLE Edema: Trace  LLE Edema: Trace  Respiratory Breath Sounds  RUL Breath Sounds: Clear  RML Breath Sounds: Clear  RLL Breath Sounds: Clear  OZIEL Breath Sounds: Clear  LLL Breath Sounds: Clear  Cardiac Assessment   Cardiac (WDL):  WDL Except

## 2024-06-10 NOTE — PROGRESS NOTES
NURSING DAILY NOTE    Name: Latanya Ferrera   Date of Admission: 5/29/2024   Admitting Diagnosis: Hemorrhagic stroke (HCC)  Attending Physician: Sofi Jules D.o.  Allergies: Pineapple    Safety  Patient Assist  Mod Assist  Patient Precautions  Fall Risk  Precaution Comments  R hemiplegia, R loss of sensation in hand, ICH  Bed Transfer Status  Standby Assist  Toilet Transfer Status   Contact Guard Assist  Assistive Devices  Rails, Wheelchair  Oxygen  None - Room Air  Diet/Therapeutic Dining  Current Diet Order   Procedures    Diet Order Diet: Regular     Pill Administration  whole  Agitated Behavioral Scale     ABS Level of Severity       Fall Risk  Has the patient had a fall this admission?   No  Carly Fisher Fall Risk Scoring  15, HIGH RISK  Fall Risk Safety Measures  bed alarm and chair alarm    Vitals  Temperature: 36.2 °C (97.2 °F)  Temp src: Oral  Pulse: 85  Respiration: 18  Blood Pressure: 138/78  Blood Pressure MAP (Calculated): 98 MM HG  BP Location: Left, Upper Arm  Patient BP Position: Sitting     Oxygen  Pulse Oximetry: 97 %  O2 (LPM): 0  O2 Delivery Device: None - Room Air    Bowel and Bladder  Last Bowel Movement  06/09/24  Stool Type  Type 5: Soft blob with clear cut edges (passed easily)  Bowel Device  Bathroom  Continent  Bladder: Did not void   Bowel: No movement  Bladder Function  Urine Void (mL):  (large)  Number of Times Voided: 1  Urinary Options: Yes  Urine Color: Unable To Evaluate  Genitourinary Assessment   Bladder Assessment (WDL):  Within Defined Limits  Ramirez Catheter: Not Applicable  Ramirez Reasons per MD Order: Acute urinary retention or bladder outlet obstruction  Ramirez Care: Given with Soap and Water  Urinary Elimination: Catheter (Document on LDA)  Urine Color: Unable To Evaluate  Bladder Device: Bathroom  Bladder Scan: Post Void  $ Bladder Scan Results (mL): 100  Bladder Medications: Yes    Skin  Xavier Score   17  Sensory  Interventions   Bed Types: Standard/Trauma Mattress  Skin Preventative Measures: Waffle Overlay  Moisture Interventions  Moisturizers/Barriers: Barrier Wipes  Containment Devices: Indwelling Catheter      Pain  Pain Rating Scale  9 - Can't bear the pain, unable to do anything  Pain Location  Leg  Pain Location Orientation  Right  Pain Interventions   Medication (see MAR)    ADLs    Bathing   Patient Refused Bathing (Family showered the other day as per patient)  Linen Change   Partial  Personal Hygiene     Chlorhexidine Bath      Oral Care  Brushed Teeth  Teeth/Dentures  Missing Teeth (Comments)  Shave     Nutrition Percentage Eaten  Between 50-75% Consumed  Environmental Precautions  Treaded Slipper Socks on Patient  Patient Turns/Positioning  Patient Turns Self from Side to Side  Patient Turns Assistance/Tolerance  Assistance of One, Tolerates Well  Bed Positions  Bed Controls On, Bed Locked  Head of Bed Elevated  Less than 30 degrees      Psychosocial/Neurologic Assessment  Psychosocial Assessment  Psychosocial (WDL):  WDL Except  Patient Behaviors: Fatigue  Family Behaviors: No Family Present  Neurologic Assessment  Neuro (WDL): Exceptions to WDL  Level of Consciousness: Alert  Orientation Level: Oriented X4  Cognition: Follows commands, Appropriate attention/concentration  Speech: Clear  Pupil Assesment: No  R Pupil Size (mm): 3  R Pupil Shape / Description: Round  R Pupil Reaction: Brisk  L Pupil Size (mm): 3  L Pupil Shape / Description: Round  L Pupil Reaction: Brisk  Motor Function/Sensation Assessment: Sensation, Motor strength  RUE Sensation: Numbness  Muscle Strength Right Arm: Fair Strength against Gravity but No Resistance  LUE Sensation: Full sensation  Muscle Strength Left Arm: Normal Strength Against Gravity and Full Resistance  RLE Sensation: Numbness  Muscle Strength Right Leg: Fair Strength against Gravity but No Resistance  LLE Sensation: Full sensation  Muscle Strength Left Leg: Normal Strength  Against Gravity and Full Resistance  EENT (WDL):  Within Defined Limits    Cardio/Pulmonary Assessment  Edema   RLE Edema: Trace  LLE Edema: Trace  Respiratory Breath Sounds  RUL Breath Sounds: Clear  RML Breath Sounds: Clear  RLL Breath Sounds: Clear  OZIEL Breath Sounds: Clear  LLL Breath Sounds: Clear  Cardiac Assessment   Cardiac (WDL):  WDL Except

## 2024-06-10 NOTE — PROGRESS NOTES
"  Physical Medicine & Rehabilitation Progress Note    Encounter Date: 6/10/2024    Chief Complaint: Nerve pain     Interval Events (Subjective):  VS: Still has occasional elevation at 148 and 144 over past 3 days but otherwise BP within normal range.   BM 6/9  Voiding with low PVRs, continent    Seen and examined in her room. She is having more nerve pain on the right side, but she walked today and states that she cried. Is wondering if she needs to buy a wheelchair or not.       ROS: 14 point ROS negative unless otherwise specified in the HPI    Objective:  VITAL SIGNS: /79   Pulse 89   Temp 36.4 °C (97.6 °F) (Oral)   Resp 18   Ht 1.676 m (5' 6\")   Wt 81.6 kg (180 lb)   SpO2 97%   BMI 29.05 kg/m²     GEN: No apparent distress  HEENT: Head normocephalic, atraumatic.  Sclera nonicteric bilaterally, no ocular discharge appreciated bilaterally.  PULMONARY: Breathing nonlabored on room air, no respiratory accessory muscle use.  Not requiring supplemental oxygen.  SKIN: No appreciable skin breakdown on exposed areas of skin.  PSYCH: Mood and affect within normal limits.  NEURO: Awake alert.  Conversational.  Logical thought content. Right hemiparesis improving.    Laboratory Values:  Recent Results (from the past 72 hour(s))   CBC WITH DIFFERENTIAL    Collection Time: 06/10/24  5:27 AM   Result Value Ref Range    WBC 10.7 4.8 - 10.8 K/uL    RBC 3.64 (L) 4.20 - 5.40 M/uL    Hemoglobin 10.5 (L) 12.0 - 16.0 g/dL    Hematocrit 34.3 (L) 37.0 - 47.0 %    MCV 94.2 81.4 - 97.8 fL    MCH 28.8 27.0 - 33.0 pg    MCHC 30.6 (L) 32.2 - 35.5 g/dL    RDW 45.1 35.9 - 50.0 fL    Platelet Count 285 164 - 446 K/uL    MPV 10.4 9.0 - 12.9 fL    Neutrophils-Polys 75.20 (H) 44.00 - 72.00 %    Lymphocytes 12.90 (L) 22.00 - 41.00 %    Monocytes 7.00 0.00 - 13.40 %    Eosinophils 3.40 0.00 - 6.90 %    Basophils 0.40 0.00 - 1.80 %    Immature Granulocytes 1.10 (H) 0.00 - 0.90 %    Nucleated RBC 0.00 0.00 - 0.20 /100 WBC    Neutrophils " (Absolute) 8.02 (H) 1.82 - 7.42 K/uL    Lymphs (Absolute) 1.37 1.00 - 4.80 K/uL    Monos (Absolute) 0.75 0.00 - 0.85 K/uL    Eos (Absolute) 0.36 0.00 - 0.51 K/uL    Baso (Absolute) 0.04 0.00 - 0.12 K/uL    Immature Granulocytes (abs) 0.12 (H) 0.00 - 0.11 K/uL    NRBC (Absolute) 0.00 K/uL   Comp Metabolic Panel    Collection Time: 06/10/24  5:27 AM   Result Value Ref Range    Sodium 140 135 - 145 mmol/L    Potassium 4.0 3.6 - 5.5 mmol/L    Chloride 106 96 - 112 mmol/L    Co2 21 20 - 33 mmol/L    Anion Gap 13.0 7.0 - 16.0    Glucose 106 (H) 65 - 99 mg/dL    Bun 16 8 - 22 mg/dL    Creatinine 0.63 0.50 - 1.40 mg/dL    Calcium 8.9 8.5 - 10.5 mg/dL    Correct Calcium 9.1 8.5 - 10.5 mg/dL    AST(SGOT) 21 12 - 45 U/L    ALT(SGPT) 44 2 - 50 U/L    Alkaline Phosphatase 85 30 - 99 U/L    Total Bilirubin 0.3 0.1 - 1.5 mg/dL    Albumin 3.7 3.2 - 4.9 g/dL    Total Protein 6.6 6.0 - 8.2 g/dL    Globulin 2.9 1.9 - 3.5 g/dL    A-G Ratio 1.3 g/dL   ESTIMATED GFR    Collection Time: 06/10/24  5:27 AM   Result Value Ref Range    GFR (CKD-EPI) 104 >60 mL/min/1.73 m 2       Medications:  Scheduled Medications   Medication Dose Frequency    lisinopril  60 mg Q DAY    hydrALAZINE  75 mg 4X/DAY    gabapentin  400 mg TID    benzonatate  100 mg TID    vitamin D3  5,000 Units DAILY    enoxaparin (LOVENOX) injection  40 mg DAILY AT 1800    Pharmacy Consult Request  1 Each PHARMACY TO DOSE    omeprazole  20 mg DAILY    amLODIPine  10 mg Q DAY     PRN medications: docusate sodium, senna-docusate **AND** polyethylene glycol/lytes, oxyCODONE immediate-release, Respiratory Therapy Consult, hydrALAZINE, bisacodyl, magnesium hydroxide, acetaminophen, carboxymethylcellulose, benzocaine-menthol, mag hydrox-al hydrox-simeth, ondansetron **OR** ondansetron, traZODone, sodium chloride, ipratropium-albuterol, melatonin    Diet:  Current Diet Order   Procedures    Diet Order Diet: Regular     [facility] 5/24/24 [facility] 6/5/24 [facility]      Study is compared to  prior from 2024. Since the previous study the left basal ganglia/thalamic hemorrhage has decreased in size. Previously was 1.7 x 1.3 cm and currently 1.2 x 1.0 cm. Surrounding vasogenic edema is similar.       Medical Decision Making and Plan:  Left Basal Ganglia ICH secondary to hypertensive emergency  Right Hemiparesis  PT and OT for mobility and ADLs. Per guidelines, 15 hours per week between PT, OT and/or SLP.  Follow-up Neurology (Dr. Babb)  Secondary stroke ppx: Anti-hypertensives    Headache  Neck Pain  6/3 VSS. No other symptoms   Resolved. Tylenol relieved her symptoms. No change in neuro status.    Continues to have headache which returned last night severely. Recently started on DVT ppx. STAT CTH to ensure no expansion of bleed. CTH showing subacute ICH. D/w rads directly 2024 3:05 PM - not concerned for it being acute, looks older, but will get [facility] for formal comparison. Hold Lovenox for now to error on side of caution, recent US BLE neg for DVT.    Oxycodone 2.5mg daily PRN headache     Hypertension  Continue Hydralazine 50mg q6 hours -->  75mg every 6 hours  Continue Lisinopril 40mg daily -->  60mg daily --> 6/10 Increase to 80 mg daily   Continue Amlodipine 10mg daily   6/10 VSS rarely elevation into 140's SBP. Increase Lisinopril.      Leukocytosis  Improving on admission but unclear cause  Continue Rocephin x 2 doses (through ) - ok to d/c IV after  6/10 stable 10.7     Urinary Retention  Has otero - required CIC   Resolved. Off of flomax    Chronic Cough  Secondary to years of smoking. D/w patient and daughter, nothing has changed in terms of her cough, nothing new or concerning to them.   Tessalon perls scheduled TID    Hyperphosphatemia  6/3 Resolved, Ph normal     Anemia  6/10 Stable in 10's     Neuropathic Pain  6/3 Continue Gabapentin - increase to 400mg TID --> 6/10 600mg TID     Bowel   6/10 Having regular bowel movements        Upcoming Labs/imagin/13      DVT PROPHYLAXIS: None - Hemorrhagic stroke. Check US - negative. Per Neuro note, clear for DVT chemo-ppx once MRI completed (completed 5/24). Lovenox 40 mg SQ started 6/3. Hold Lovenox 6/5/2024 until can formally compare CTH but per d/w radiologist directly low c/f acute bleed as appears older. Reviewed personally and no acute finding on new CTH. 6/7 Restart Lovenox ppx.      HOSPITALIST FOLLOWING: No     CODE STATUS: FULL CODE     DISPO: Home with minimal support     VIANCA: 6/20/24 - Requesting extension as patient making really good progress     MEDS SENT TO: Not [facility]     DISCHARGE SPECIALIST FOLLOW UP: Neurology     Patient to scheduled follow up with their PCP within 2 weeks from discharge from the [facility].       _____________________________________  Interdisciplinary Team Conference   Most recent IDT on 6/10/2024    I, Dr. Sofi Jules DO, MS, was present and led the interdisciplinary team conference on 6/10/2024.  I led the IDT conference and agree with the IDT conference documentation and plan of care as noted below.     Nursing:  Diet Current Diet Order   Procedures    Diet Order Diet: Regular       Eating ADL Modified Independent  Set-up of equipment or meal/tube feeding   % of Last Meal  Oral Nutrition: Lunch, Between 50-75% Consumed   Sleep    Bowel Last BM: 06/09/24   Bladder Continent        Physical Therapy:  Bed Mobility    Transfers Contact Guard Assist  Squat pivot transfer to wheelchair, Increased time, Set-up of equipment, Supervision for safety, Verbal cueing (poor eccentric control)   Mobility Minimal Assist   Stairs    Knee hyperextension with stance. Self corrects with muscle firing.   Fall risk. Will need caregiver with her to ambulate otherwise needs to be wheelchair level. She will need supervision.     Occupational Therapy:  Grooming Supervision, Seated   Bathing  (Patient deferred at this time, reporting she bathed with her daughter yesterday)   UB Dressing  Supervision   LB Dressing Contact Guard Assist   Toileting Contact Guard Assist   Shower & Transfer Supervision and set up   Can do some side stepping in kitchen with CGA using counter for support     Speech-Language Pathology:  Comprehension:  Modified Independent  Comprehension Description: Glasses  Expression:  Independent     Social Interaction:  Independent  Problem Solving:  Minimal Assist  Problem Solving Description:  Verbal cueing, Therapy schedule, Bed/chair alarm, Increased time, Seat belt  Memory:  Minimal Assist  Memory Description:  Verbal cueing, Therapy schedule, Increased time, Seat belt    Med sorting 100% accuracy. Self distracting. Will need to do some training with daughter.     Respiratory Therapy:  O2 (LPM): 0  O2 Delivery Device: None - Room Air    Case Management:  Continues to work on disposition and DME needs.     BARRIERS TO DISCHARGE HOME:  Family training  Equipment: Grab bars, tub bench  OP vs HH services   Medical Stability   Impulsivity     Discharge Date/Disposition:  6/20/24  _____________________________________

## 2024-06-11 ENCOUNTER — APPOINTMENT (OUTPATIENT)
Dept: OCCUPATIONAL THERAPY | Facility: REHABILITATION | Age: 59
DRG: 057 | End: 2024-06-11
Attending: PHYSICAL MEDICINE & REHABILITATION
Payer: MEDICAID

## 2024-06-11 ENCOUNTER — APPOINTMENT (OUTPATIENT)
Dept: SPEECH THERAPY | Facility: REHABILITATION | Age: 59
DRG: 057 | End: 2024-06-11
Attending: PHYSICAL MEDICINE & REHABILITATION
Payer: MEDICAID

## 2024-06-11 ENCOUNTER — APPOINTMENT (OUTPATIENT)
Dept: PHYSICAL THERAPY | Facility: REHABILITATION | Age: 59
DRG: 057 | End: 2024-06-11
Attending: PHYSICAL MEDICINE & REHABILITATION
Payer: MEDICAID

## 2024-06-11 PROCEDURE — 97116 GAIT TRAINING THERAPY: CPT

## 2024-06-11 PROCEDURE — 97530 THERAPEUTIC ACTIVITIES: CPT

## 2024-06-11 PROCEDURE — 97129 THER IVNTJ 1ST 15 MIN: CPT

## 2024-06-11 PROCEDURE — 97112 NEUROMUSCULAR REEDUCATION: CPT

## 2024-06-11 PROCEDURE — 770010 HCHG ROOM/CARE - REHAB SEMI PRIVAT*

## 2024-06-11 PROCEDURE — 99232 SBSQ HOSP IP/OBS MODERATE 35: CPT | Performed by: PHYSICAL MEDICINE & REHABILITATION

## 2024-06-11 PROCEDURE — 97130 THER IVNTJ EA ADDL 15 MIN: CPT

## 2024-06-11 PROCEDURE — 97110 THERAPEUTIC EXERCISES: CPT

## 2024-06-11 PROCEDURE — A9270 NON-COVERED ITEM OR SERVICE: HCPCS | Performed by: PHYSICAL MEDICINE & REHABILITATION

## 2024-06-11 PROCEDURE — 97535 SELF CARE MNGMENT TRAINING: CPT

## 2024-06-11 PROCEDURE — 700111 HCHG RX REV CODE 636 W/ 250 OVERRIDE (IP): Mod: JZ | Performed by: PHYSICAL MEDICINE & REHABILITATION

## 2024-06-11 PROCEDURE — 700102 HCHG RX REV CODE 250 W/ 637 OVERRIDE(OP): Performed by: PHYSICAL MEDICINE & REHABILITATION

## 2024-06-11 RX ADMIN — BENZONATATE 100 MG: 100 CAPSULE ORAL at 08:24

## 2024-06-11 RX ADMIN — HYDRALAZINE HYDROCHLORIDE 75 MG: 50 TABLET ORAL at 14:04

## 2024-06-11 RX ADMIN — OMEPRAZOLE 20 MG: 20 CAPSULE, DELAYED RELEASE ORAL at 08:24

## 2024-06-11 RX ADMIN — Medication 5000 UNITS: at 08:24

## 2024-06-11 RX ADMIN — GABAPENTIN 400 MG: 400 CAPSULE ORAL at 15:01

## 2024-06-11 RX ADMIN — BENZONATATE 100 MG: 100 CAPSULE ORAL at 15:01

## 2024-06-11 RX ADMIN — BENZONATATE 100 MG: 100 CAPSULE ORAL at 20:08

## 2024-06-11 RX ADMIN — ACETAMINOPHEN 500 MG: 500 TABLET, FILM COATED ORAL at 18:03

## 2024-06-11 RX ADMIN — HYDRALAZINE HYDROCHLORIDE 75 MG: 50 TABLET ORAL at 17:59

## 2024-06-11 RX ADMIN — LISINOPRIL 80 MG: 20 TABLET ORAL at 05:15

## 2024-06-11 RX ADMIN — AMLODIPINE BESYLATE 10 MG: 5 TABLET ORAL at 05:15

## 2024-06-11 RX ADMIN — HYDRALAZINE HYDROCHLORIDE 75 MG: 50 TABLET ORAL at 08:24

## 2024-06-11 RX ADMIN — OXYCODONE 2.5 MG: 5 TABLET ORAL at 20:08

## 2024-06-11 RX ADMIN — HYDRALAZINE HYDROCHLORIDE 75 MG: 50 TABLET ORAL at 20:08

## 2024-06-11 RX ADMIN — GABAPENTIN 400 MG: 400 CAPSULE ORAL at 08:26

## 2024-06-11 RX ADMIN — OXYCODONE 2.5 MG: 5 TABLET ORAL at 15:04

## 2024-06-11 RX ADMIN — ACETAMINOPHEN 500 MG: 500 TABLET, FILM COATED ORAL at 08:23

## 2024-06-11 RX ADMIN — ENOXAPARIN SODIUM 40 MG: 100 INJECTION SUBCUTANEOUS at 18:00

## 2024-06-11 RX ADMIN — GABAPENTIN 400 MG: 400 CAPSULE ORAL at 20:08

## 2024-06-11 ASSESSMENT — GAIT ASSESSMENTS
DISTANCE (FEET): 60
GAIT LEVEL OF ASSIST: MODERATE ASSIST

## 2024-06-11 ASSESSMENT — ACTIVITIES OF DAILY LIVING (ADL)
BED_CHAIR_WHEELCHAIR_TRANSFER_DESCRIPTION: SET-UP OF EQUIPMENT;SUPERVISION FOR SAFETY;VERBAL CUEING
BED_CHAIR_WHEELCHAIR_TRANSFER_DESCRIPTION: INCREASED TIME;VERBAL CUEING;SUPERVISION FOR SAFETY;SET-UP OF EQUIPMENT

## 2024-06-11 NOTE — DISCHARGE PLANNING
KAVIN LM on daughter Alcira's VM to update on new DC date of 6/20/24.  CM will continue to monitor for DC needs.

## 2024-06-11 NOTE — THERAPY
Speech Language Pathology  Daily Treatment     Patient Name: Latanya Ferrera  Age:  58 y.o., Sex:  female  Medical Record #: [id number]  Today's Date: 6/11/2024     Precautions  Precautions: (P) Fall Risk  Comments: R hemiplegia, R loss of sensation in hand, ICH    Subjective    Pt very happy to hear of extended d/c date, called daughter to inform. Will continue with scheduled family training tomorrow as previously planned.      Objective       06/11/24 0904   Treatment Charges   SLP Cognitive Skill Development First 15 Minutes 1   SLP Cognitive Skill Development Additional 15 Minutes 3   SLP Total Time Spent   SLP Individual Total Time Spent (Mins) 60   Cognition - Detailed   Moderate Attention Minimal (4)   Interdisciplinary Plan of Care Collaboration   Patient Position at End of Therapy In Bed;Call Light within Reach;Tray Table within Reach         Assessment    Pt completing functional writing with use of built up writing implement (red foam). Pt able to write legibly name, address, phone number as well as daughter and grandson's names. Pt RUE fatigues quickly requiring breaks. Pt tearful when legibility of writing decreased 2/2 fatigue, benefits from encouragement and reinforcement in progress thus far.   Alternating attention: 85%, 92% IND, with MIN cues increased to 100%.     Strengths: Able to follow instructions, Alert and oriented, Effective communication skills, Motivated for self care and independence, Pleasant and cooperative, Willingly participates in therapeutic activities, Supportive family, Making steady progress towards goals  Barriers: Hemiparesis, Decreased endurance    Plan    Complete SCCAN, compare to prior results, if appropriate consider decrease of ST to 30 minutes for remainder of stay at [facility].    Passport items to be completed:  Express basic needs, understand food/liquid recommendations, consistently follow swallow precautions, manage finances, manage medications, arrive to therapy  appointments on time, complete daily memory log entries, solve problems related to safety situations, review education related to hospitalization, complete caregiver training     Speech Therapy Problems (Active)       Problem: Memory STGs       Dates: Start:  05/30/24         Goal: STG-Within one week, patient will demonstrate use of memory strategies in order to recall information from therapies after a 2/hr delay.        Dates: Start:  05/30/24         Goal Note filed on 06/10/24 1145 by Mya Steven MS,CCC-SLP       Pt continues to benefit from repetitions and reinforcement for novel information                 Problem: Speech/Swallowing LTGs       Dates: Start:  05/30/24         Goal: LTG-By discharge, patient will solve complex problems related to IADL's in order to d/c home with mod I        Dates: Start:  05/30/24

## 2024-06-11 NOTE — THERAPY
"Physical Therapy   Daily Treatment     Patient Name: Latanya Ferrera  Age:  58 y.o., Sex:  female  Medical Record #: [id number]  Today's Date: 6/11/2024     Precautions  Precautions: Fall Risk  Comments: R hemiplegia, R loss of sensation in hand, ICH    Subjective    \"I've been waiting for you. My daughter brought me shoes.\" Pt seated EOB at arrival, agreeable to PT tx.      Objective       06/11/24 1031   PT Charge Group   PT Gait Training (Units) 2   PT Therapeutic Exercise (Units) 1   PT Neuromuscular Re-Education / Balance (Units) 1   PT Total Time Spent   PT Individual Total Time Spent (Mins) 60   Gait Functional Level of Assist    Gait Level Of Assist Moderate Assist   Assistive Device None   Distance (Feet) 60   # of Times Distance was Traveled 1   Deviation Step To;Decreased Base Of Support;Increased Base Of Support;Decreased Heel Strike;Decreased Toe Off  (step to vs step through, poor rhythm/rita (step, step, pause))   Wheelchair Functional Level of Assist   Wheelchair Assist Supervised   Distance Wheelchair (Feet or Distance) 50   Wheelchair Description Extra time;Leg rest management   Stairs Functional Level of Assist   Level of Assist with Stairs Minimal Assist   # of Stairs Climbed 1   Stairs Description Hand rails;Walker;Verbal cueing;Requires incidental assist;Extra time  (VC for sequencing)   Transfer Functional Level of Assist   Bed, Chair, Wheelchair Transfer Contact Guard Assist   Bed Chair Wheelchair Transfer Description Increased time;Verbal cueing;Supervision for safety;Set-up of equipment  (SPT no device)   Sitting Lower Body Exercises   Nustep Time (See Comments)  (8'00\"; Lvl 5 @ 3 x 1 min, Lvl 1 @ 3 x 1 min, 2 min warmup; BLE only, metronome for rita and MC for RLE control (neutral hip rotation), eccentric control of knee extension)   Standing Lower Body Exercises   Other Exercises BUE rows @ 20# each UE, cable machine,  assist RUE 1 x 10; standing balance c/ alternating perturbations " "at trunk, shoulders, hips x 30\", no UE support, neutral DEMI   Neuro-Muscular Treatments   Neuro-Muscular Treatments Co-Contraction;Postural Changes;Postural Facilitation;Tactile Cuing;Verbal Cuing;Weight Shift Right;Weight Shift Left;Other (See Comments)   Comments see note for TM gait training   Interdisciplinary Plan of Care Collaboration   Patient Position at End of Therapy Seated;Chair Alarm On;Call Light within Reach;Tray Table within Reach;Phone within Reach   PT DME Recommendations   Wheelchair 18\" Width;Jaciel Height (16\"-17\");Lightweight;Removable/Flip Back Armrests;Standard Leg Rests;Anti-tippers   Cushion Standard   Assistive Device Other (Comments)  (TBD)   Additional Equipment Other (Comments)  (TBD)   Physical Therapist Assigned   Assigned PT / Treatment Time / Comments Christina. 60-90     NMRE/TE/Gait:  NuStep BLE only for pregait priming, coordination of stepping pattern, LE orientation; dosing as above  Gait training: high intensity gait c/ harness and TM; BUE support on rails; cues at RLE for step length, weight acceptance, heel first contact and knee hyperextension control in stance, metronome to cue rita  1 x 2 min @ 1.5 mph  2 x 2 min @ 1.7 mph  Total distance: 805'   1 x 2 min retro @ 0.9 mph, 140'  Standing balance c/ UE resistance training for anticipatory postural control, perturbations as above in flowsheet     Assessment    Latanya with improving rita and step symmetry with TM training today. Able to complete all bouts without AFO. Continues with overshoot on RLE during swing and bilateral midfoot contact during initial contact. Will benefit from progression of high intensity gait training as well as pregait mechanics, anticipatory postural control training to improve balance and consistency c/ mobility.     Strengths: Able to follow instructions, Alert and oriented, Independent prior level of function, Motivated for self care and independence, Pleasant and cooperative, Supportive family, " "Willingly participates in therapeutic activities  Barriers: Decreased endurance, Difficulty following instructions, Fatigue, Hemiplegia, Home accessibility, Hypertension, Impaired balance, Impaired carryover of learning, Limited mobility (lives alone, decreased knowledge of condition, limited daytime family support)    Plan    TM vs OG gait training c/ metronome for rhythm/rita  Pregait to gait with dec UE support- stepping and weight acceptance/stance stability  Push/pulls c/ resistance   Ankle extrinsic strength and coordination  Functional endurance and OOB time   FT on 6/12 c/ pt and daughter        DME  PT DME Recommendations  Wheelchair: (P) 18\" Width, Jaciel Height (16\"-17\"), Lightweight, Removable/Flip Back Armrests, Standard Leg Rests, Anti-tippers  Cushion: (P) Standard  Assistive Device: (P) Other (Comments) (TBD)  Additional Equipment: (P) Other (Comments) (TBD)    Passport items to be completed:  Get in/out of bed safely, in/out of a vehicle, safely use mobility device, walk or wheel around home/community, navigate up and down stairs, show how to get up/down from the ground, ensure home is accessible, demonstrate HEP, complete caregiver training    Physical Therapy Problems (Active)       Problem: PT-Long Term Goals       Dates: Start:  05/30/24         Goal: LTG-By discharge, patient will ambulate >/= 300' c/ LRAD and CGA or better.        Dates: Start:  05/30/24            Goal: LTG-By discharge, patient will transfer one surface to another c/ Boni.        Dates: Start:  05/30/24            Goal: LTG-By discharge, patient will ambulate up/down 2 stairs c/ CGA or better to access home.        Dates: Start:  05/30/24            Goal: LTG-By discharge, patient will transfer in/out of a car c/ CGA or better.        Dates: Start:  05/30/24              "

## 2024-06-11 NOTE — THERAPY
Occupational Therapy  Daily Treatment     Patient Name: Latanya Ferrera  Age:  58 y.o., Sex:  female  Medical Record #: [id number]  Today's Date: 6/11/2024     Precautions  Precautions: (P) Fall Risk  Comments: R hemiplegia, R loss of sensation in hand, ICH         Subjective    Patient was asleep in bed, but easily awoken.  She reported she was ready for OT.       Objective       06/11/24 1331   OT Charge Group   OT Self Care / ADL (Units) 1   OT Therapy Activity (Units) 3   OT Total Time Spent   OT Individual Total Time Spent (Mins) 60   Precautions   Precautions Fall Risk   Pain 0 - 10 Group   Therapist Pain Assessment 0   Functional Level of Assist   Bed, Chair, Wheelchair Transfer Standby Assist   Bed Chair Wheelchair Transfer Description Set-up of equipment;Supervision for safety;Verbal cueing  (setup/sba stand step transfer bed <> w/c)   Fine Motor / Dexterity    Fine Motor / Dexterity Interventions Patient completed the following bilateral UE integration tasks: held large bolt in left hand and rotated nut on/off with R thumb and index finger; picked up 7 one inch blocks and placed into bucket with thumb and each other finger; picked up nuts of various sizes while they were on their side and placing in bucket; fastened/unfastened buttons, clasps, zippers, snaps and tying bows.   Bed Mobility    Supine to Sit Supervised   Sit to Supine Supervised   Scooting Supervised   Interdisciplinary Plan of Care Collaboration   Patient Position at End of Therapy In Bed;Call Light within Reach;Tray Table within Reach;Phone within Reach;Bed Alarm On   Putting On/Taking Off Footwear   Assistance Needed Physical assistance   Physical Assistance Level 25% or less   Comment able to don/doff socks and shoes, assist to tie laces   Toileting Hygiene   Assistance Needed Set-up / clean-up;Supervision;Adaptive equipment   Comment SBA   Toilet Transfer   Assistance Needed Set-up / clean-up;Supervision;Adaptive equipment   Comment setup/sba      W/C mobility mod I from room <> gym using L UE/LE.    Worked on R UE shoulder flexion, shoulder ab/adduction, forearm pronation/supination and finger flexion/extension while stacking/unstacking cones.      Assessment    Patient continues to demonstrate a high level of motivation during OT to improve her R UE function, which is improving daily.  She is looking forward to family training tomorrow morning with her daughter.   Strengths: Able to follow instructions, Good insight into deficits/needs, Independent prior level of function, Manages pain appropriately, Motivated for self care and independence, Pleasant and cooperative, Supportive family, Willingly participates in therapeutic activities  Barriers: Decreased endurance, Hemiparesis, Home accessibility, Impaired activity tolerance, Impaired balance (Impaired coordination/motor control)    Plan    Family training Wednesday morning; ADLs, safety with transfers, neuro re-ed R UE, standing tolerance/balance,  3x/week with therapy techs for adjunct therapy     DME  OT DME Recommendations  Bathroom Equipment: Tub Transfer Bench (Medicaid Only)  Additional Equipment: Other (Comments) (TBD)        Occupational Therapy Goals (Active)       Problem: Bathing       Dates: Start:  05/30/24         Goal: STG-Within one week, patient will bathe with MIN A and LRAD       Dates: Start:  05/30/24         Goal Note filed on 06/10/24 0943 by Fox Castro, OT/L       Has refused all bathing during OT; therefore not assessed                 Problem: Dressing       Dates: Start:  06/10/24         Goal: STG-Within one week, patient will dress LB with setup and supervised with grab bar for balance       Dates: Start:  06/10/24               Problem: Functional Transfers       Dates: Start:  06/10/24         Goal: STG-Within one week, patient will transfer to toilet with supervision using grab bar for support       Dates: Start:  06/10/24               Problem: OT Long Term  Goals       Dates: Start:  05/30/24         Goal: LTG-By discharge, patient will complete basic self care tasks with SPV-MOD I and LRAD       Dates: Start:  05/30/24            Goal: LTG-By discharge, patient will perform bathroom transfers with SPV-MOD I and LRAD       Dates: Start:  05/30/24               Problem: Toileting       Dates: Start:  06/10/24         Goal: STG-Within one week, patient will complete toileting tasks with supervision using grab bar for balance       Dates: Start:  06/10/24

## 2024-06-11 NOTE — PROGRESS NOTES
"  Physical Medicine & Rehabilitation Progress Note    Encounter Date: 6/11/2024    Chief Complaint: Grateful for more time     Interval Events (Subjective):  LAURA ALVAREZ 6/9  Voiding     Seen and examined in her room. She is grateful for more time. Has walked more on her own without an assistive device with therapy assist. She states it feels good to get up and walk. BP better and she is happy with this news.       ROS: 14 point ROS negative unless otherwise specified in the HPI    Objective:  VITAL SIGNS: /68   Pulse 80   Temp 36.9 °C (98.4 °F) (Oral)   Resp 18   Ht 1.676 m (5' 6\")   Wt 81.6 kg (180 lb)   SpO2 96%   BMI 29.05 kg/m²     GEN: No apparent distress  HEENT: Head normocephalic, atraumatic.  Sclera nonicteric bilaterally, no ocular discharge appreciated bilaterally.  PULMONARY: Breathing nonlabored on room air, no respiratory accessory muscle use.  Not requiring supplemental oxygen.  SKIN: No appreciable skin breakdown on exposed areas of skin.  PSYCH: Mood and affect within normal limits.  NEURO: Awake alert.  Conversational.  Logical thought content. Right hemiparesis improving. Seen in hallway walking with therapy with CGA using gait belt.     Laboratory Values:  Recent Results (from the past 72 hour(s))   CBC WITH DIFFERENTIAL    Collection Time: 06/10/24  5:27 AM   Result Value Ref Range    WBC 10.7 4.8 - 10.8 K/uL    RBC 3.64 (L) 4.20 - 5.40 M/uL    Hemoglobin 10.5 (L) 12.0 - 16.0 g/dL    Hematocrit 34.3 (L) 37.0 - 47.0 %    MCV 94.2 81.4 - 97.8 fL    MCH 28.8 27.0 - 33.0 pg    MCHC 30.6 (L) 32.2 - 35.5 g/dL    RDW 45.1 35.9 - 50.0 fL    Platelet Count 285 164 - 446 K/uL    MPV 10.4 9.0 - 12.9 fL    Neutrophils-Polys 75.20 (H) 44.00 - 72.00 %    Lymphocytes 12.90 (L) 22.00 - 41.00 %    Monocytes 7.00 0.00 - 13.40 %    Eosinophils 3.40 0.00 - 6.90 %    Basophils 0.40 0.00 - 1.80 %    Immature Granulocytes 1.10 (H) 0.00 - 0.90 %    Nucleated RBC 0.00 0.00 - 0.20 /100 WBC    Neutrophils " (Absolute) 8.02 (H) 1.82 - 7.42 K/uL    Lymphs (Absolute) 1.37 1.00 - 4.80 K/uL    Monos (Absolute) 0.75 0.00 - 0.85 K/uL    Eos (Absolute) 0.36 0.00 - 0.51 K/uL    Baso (Absolute) 0.04 0.00 - 0.12 K/uL    Immature Granulocytes (abs) 0.12 (H) 0.00 - 0.11 K/uL    NRBC (Absolute) 0.00 K/uL   Comp Metabolic Panel    Collection Time: 06/10/24  5:27 AM   Result Value Ref Range    Sodium 140 135 - 145 mmol/L    Potassium 4.0 3.6 - 5.5 mmol/L    Chloride 106 96 - 112 mmol/L    Co2 21 20 - 33 mmol/L    Anion Gap 13.0 7.0 - 16.0    Glucose 106 (H) 65 - 99 mg/dL    Bun 16 8 - 22 mg/dL    Creatinine 0.63 0.50 - 1.40 mg/dL    Calcium 8.9 8.5 - 10.5 mg/dL    Correct Calcium 9.1 8.5 - 10.5 mg/dL    AST(SGOT) 21 12 - 45 U/L    ALT(SGPT) 44 2 - 50 U/L    Alkaline Phosphatase 85 30 - 99 U/L    Total Bilirubin 0.3 0.1 - 1.5 mg/dL    Albumin 3.7 3.2 - 4.9 g/dL    Total Protein 6.6 6.0 - 8.2 g/dL    Globulin 2.9 1.9 - 3.5 g/dL    A-G Ratio 1.3 g/dL   ESTIMATED GFR    Collection Time: 06/10/24  5:27 AM   Result Value Ref Range    GFR (CKD-EPI) 104 >60 mL/min/1.73 m 2       Medications:  Scheduled Medications   Medication Dose Frequency    lisinopril  80 mg Q DAY    hydrALAZINE  75 mg 4X/DAY    gabapentin  400 mg TID    benzonatate  100 mg TID    vitamin D3  5,000 Units DAILY    enoxaparin (LOVENOX) injection  40 mg DAILY AT 1800    Pharmacy Consult Request  1 Each PHARMACY TO DOSE    omeprazole  20 mg DAILY    amLODIPine  10 mg Q DAY     PRN medications: docusate sodium, senna-docusate **AND** polyethylene glycol/lytes, oxyCODONE immediate-release, Respiratory Therapy Consult, hydrALAZINE, bisacodyl, magnesium hydroxide, acetaminophen, carboxymethylcellulose, benzocaine-menthol, mag hydrox-al hydrox-simeth, ondansetron **OR** ondansetron, traZODone, sodium chloride, ipratropium-albuterol, melatonin    Diet:  Current Diet Order   Procedures    Diet Order Diet: Regular     [facility] 5/24/24 [facility] 6/5/24 [facility]      Study is compared to  prior from 2024. Since the previous study the left basal ganglia/thalamic hemorrhage has decreased in size. Previously was 1.7 x 1.3 cm and currently 1.2 x 1.0 cm. Surrounding vasogenic edema is similar.       Medical Decision Making and Plan:  Left Basal Ganglia ICH secondary to hypertensive emergency  Right Hemiparesis  PT and OT for mobility and ADLs. Per guidelines, 15 hours per week between PT, OT and/or SLP.  Follow-up Neurology (Dr. Babb)  Secondary stroke ppx: Anti-hypertensives    Headache  Neck Pain  6/3 VSS. No other symptoms   Resolved. Tylenol relieved her symptoms. No change in neuro status.    Continues to have headache which returned last night severely. Recently started on DVT ppx. STAT CTH to ensure no expansion of bleed. CTH showing subacute ICH. D/w rads directly 2024 3:05 PM - not concerned for it being acute, looks older, but will get [facility] for formal comparison. Hold Lovenox for now to error on side of caution, recent US BLE neg for DVT.    Oxycodone 2.5mg daily PRN headache     Hypertension  Continue Hydralazine 50mg q6 hours -->  75mg every 6 hours  Continue Lisinopril 40mg daily -->  60mg daily --> 6/10 Increase to 80 mg daily   Continue Amlodipine 10mg daily    BP normal past 24 hours     Leukocytosis  Improving on admission but unclear cause  Continue Rocephin x 2 doses (through ) - ok to d/c IV after  6/10 stable 10.7     Urinary Retention  Has otero - required CIC   Resolved. Off of flomax    Chronic Cough  Secondary to years of smoking. D/w patient and daughter, nothing has changed in terms of her cough, nothing new or concerning to them.   Tessalon perls scheduled TID    Hyperphosphatemia  6/3 Resolved, Ph normal     Anemia  6/10 Stable in 10's     Neuropathic Pain  6/3 Continue Gabapentin - increase to 400mg TID --> 6/10 600mg TID     Bowel   6/10 Having regular bowel movements     Upcoming Labs/imagin/13     DVT PROPHYLAXIS: None - Hemorrhagic  stroke. Check US - negative. Per Neuro note, clear for DVT chemo-ppx once MRI completed (completed 5/24). Lovenox 40 mg SQ started 6/3. Hold Lovenox 6/5/2024 until can formally compare CTH but per d/w radiologist directly low c/f acute bleed as appears older. Reviewed personally and no acute finding on new CTH. 6/7 Restart Lovenox ppx.      HOSPITALIST FOLLOWING: No     CODE STATUS: FULL CODE     DISPO: Home with minimal support     VIANCA: 6/20/24 - Requesting extension as patient making really good progress     MEDS SENT TO: Not [facility]     DISCHARGE SPECIALIST FOLLOW UP: Neurology     Patient to scheduled follow up with their PCP within 2 weeks from discharge from the [facility].     Dr. Sofi Jules DO, MS  Department of Physical Medicine & Rehabilitation

## 2024-06-12 ENCOUNTER — APPOINTMENT (OUTPATIENT)
Dept: SPEECH THERAPY | Facility: REHABILITATION | Age: 59
DRG: 057 | End: 2024-06-12
Attending: PHYSICAL MEDICINE & REHABILITATION
Payer: MEDICAID

## 2024-06-12 ENCOUNTER — APPOINTMENT (OUTPATIENT)
Dept: OCCUPATIONAL THERAPY | Facility: REHABILITATION | Age: 59
DRG: 057 | End: 2024-06-12
Attending: PHYSICAL MEDICINE & REHABILITATION
Payer: MEDICAID

## 2024-06-12 ENCOUNTER — APPOINTMENT (OUTPATIENT)
Dept: PHYSICAL THERAPY | Facility: REHABILITATION | Age: 59
DRG: 057 | End: 2024-06-12
Attending: PHYSICAL MEDICINE & REHABILITATION
Payer: MEDICAID

## 2024-06-12 PROCEDURE — 97530 THERAPEUTIC ACTIVITIES: CPT

## 2024-06-12 PROCEDURE — 700111 HCHG RX REV CODE 636 W/ 250 OVERRIDE (IP): Mod: JZ | Performed by: PHYSICAL MEDICINE & REHABILITATION

## 2024-06-12 PROCEDURE — 700102 HCHG RX REV CODE 250 W/ 637 OVERRIDE(OP): Performed by: PHYSICAL MEDICINE & REHABILITATION

## 2024-06-12 PROCEDURE — 97130 THER IVNTJ EA ADDL 15 MIN: CPT

## 2024-06-12 PROCEDURE — 97112 NEUROMUSCULAR REEDUCATION: CPT

## 2024-06-12 PROCEDURE — 99232 SBSQ HOSP IP/OBS MODERATE 35: CPT | Performed by: PHYSICAL MEDICINE & REHABILITATION

## 2024-06-12 PROCEDURE — A9270 NON-COVERED ITEM OR SERVICE: HCPCS | Performed by: PHYSICAL MEDICINE & REHABILITATION

## 2024-06-12 PROCEDURE — 770010 HCHG ROOM/CARE - REHAB SEMI PRIVAT*

## 2024-06-12 PROCEDURE — 97129 THER IVNTJ 1ST 15 MIN: CPT

## 2024-06-12 PROCEDURE — 97116 GAIT TRAINING THERAPY: CPT

## 2024-06-12 RX ADMIN — GABAPENTIN 400 MG: 400 CAPSULE ORAL at 08:04

## 2024-06-12 RX ADMIN — HYDRALAZINE HYDROCHLORIDE 75 MG: 50 TABLET ORAL at 08:03

## 2024-06-12 RX ADMIN — ACETAMINOPHEN 500 MG: 500 TABLET, FILM COATED ORAL at 08:08

## 2024-06-12 RX ADMIN — GABAPENTIN 400 MG: 400 CAPSULE ORAL at 20:47

## 2024-06-12 RX ADMIN — HYDRALAZINE HYDROCHLORIDE 75 MG: 50 TABLET ORAL at 20:47

## 2024-06-12 RX ADMIN — HYDRALAZINE HYDROCHLORIDE 75 MG: 50 TABLET ORAL at 16:56

## 2024-06-12 RX ADMIN — ENOXAPARIN SODIUM 40 MG: 100 INJECTION SUBCUTANEOUS at 16:58

## 2024-06-12 RX ADMIN — Medication 5000 UNITS: at 08:04

## 2024-06-12 RX ADMIN — OXYCODONE 2.5 MG: 5 TABLET ORAL at 20:51

## 2024-06-12 RX ADMIN — HYDRALAZINE HYDROCHLORIDE 75 MG: 50 TABLET ORAL at 12:02

## 2024-06-12 RX ADMIN — OXYCODONE 2.5 MG: 5 TABLET ORAL at 16:57

## 2024-06-12 RX ADMIN — BENZONATATE 100 MG: 100 CAPSULE ORAL at 20:47

## 2024-06-12 RX ADMIN — GABAPENTIN 400 MG: 400 CAPSULE ORAL at 14:28

## 2024-06-12 RX ADMIN — BENZONATATE 100 MG: 100 CAPSULE ORAL at 14:28

## 2024-06-12 RX ADMIN — AMLODIPINE BESYLATE 10 MG: 5 TABLET ORAL at 04:57

## 2024-06-12 RX ADMIN — LISINOPRIL 80 MG: 20 TABLET ORAL at 04:57

## 2024-06-12 RX ADMIN — OXYCODONE 2.5 MG: 5 TABLET ORAL at 04:57

## 2024-06-12 RX ADMIN — OMEPRAZOLE 20 MG: 20 CAPSULE, DELAYED RELEASE ORAL at 08:04

## 2024-06-12 RX ADMIN — BENZONATATE 100 MG: 100 CAPSULE ORAL at 08:03

## 2024-06-12 RX ADMIN — ACETAMINOPHEN 500 MG: 500 TABLET, FILM COATED ORAL at 16:57

## 2024-06-12 ASSESSMENT — GAIT ASSESSMENTS
ASSISTIVE DEVICE: FRONT WHEEL WALKER
DISTANCE (FEET): 240
GAIT LEVEL OF ASSIST: CONTACT GUARD ASSIST

## 2024-06-12 ASSESSMENT — PAIN DESCRIPTION - PAIN TYPE
TYPE: ACUTE PAIN
TYPE: ACUTE PAIN

## 2024-06-12 ASSESSMENT — ACTIVITIES OF DAILY LIVING (ADL)
TOILET_TRANSFER_DESCRIPTION: GRAB BAR;INCREASED TIME;INITIAL PREPARATION FOR TASK
BED_CHAIR_WHEELCHAIR_TRANSFER_DESCRIPTION: INCREASED TIME;VERBAL CUEING;SUPERVISION FOR SAFETY
BED_CHAIR_WHEELCHAIR_TRANSFER_DESCRIPTION: SUPERVISION FOR SAFETY;VERBAL CUEING;SET-UP OF EQUIPMENT;INITIAL PREPARATION FOR TASK;INCREASED TIME
TOILET_TRANSFER_DESCRIPTION: ADAPTIVE EQUIPMENT;GRAB BAR;SUPERVISION FOR SAFETY;VERBAL CUEING

## 2024-06-12 NOTE — THERAPY
"Physical Therapy   Daily Treatment     Patient Name: Latanya Ferrera  Age:  58 y.o., Sex:  female  Medical Record #: [id number]  Today's Date: 6/12/2024     Precautions  Precautions: Fall Risk  Comments: R hemiplegia, R loss of sensation in hand, ICH    Subjective    Patient in w/c at arrival, daughter and grandson present in room. Agreeable to FT. \"I've been waiting for you.\"      Objective       06/12/24 0901   PT Charge Group   PT Gait Training (Units) 1   PT Neuromuscular Re-Education / Balance (Units) 1   PT Therapeutic Activities (Units) 2   PT Total Time Spent   PT Individual Total Time Spent (Mins) 60   Gait Functional Level of Assist    Gait Level Of Assist Contact Guard Assist   Assistive Device Front Wheel Walker  ( assist strap RUE)   Distance (Feet) 240   # of Times Distance was Traveled 2   Deviation Decreased Heel Strike;Ataxic  (overshoot RLE swing c/ delayed anterior WS and weight acceptance)   Wheelchair Functional Level of Assist   Wheelchair Assist Supervised   Distance Wheelchair (Feet or Distance) 150   Wheelchair Description Extra time;Leg rest management   Stairs Functional Level of Assist   Level of Assist with Stairs Minimal Assist  (steadying and VC sequencing)   # of Stairs Climbed 3  (@ 6\"H curbs c/ FWW)   Stairs Description Extra time;Verbal cueing;Walker   Transfer Functional Level of Assist   Bed, Chair, Wheelchair Transfer Contact Guard Assist   Bed Chair Wheelchair Transfer Description Increased time;Verbal cueing;Supervision for safety  (stand step c/ FWW or no device, cues for incremental step vs twisting to sit)   Toilet Transfers Contact Guard Assist   Toilet Transfer Description Grab bar;Increased time;Initial preparation for task   Bed Mobility    Supine to Sit Supervised   Sit to Stand Standby Assist   Scooting Supervised   Rolling Supervised   Neuro-Muscular Treatments   Neuro-Muscular Treatments Anterior weight shift;Co-Contraction;Postural Facilitation;Sequencing;Tactile " "Cuing;Verbal Cuing;Weight Shift Right;Weight Shift Left   Comments gait training c/ FWW on level surfaces, BTB to cue tibial advancement on RLE during loading response, auditory cues for rita via metronome (52 bpm), VC for heel first contact on RLE 1 x 200'; strap assist for RUE contact on walker, incidental steadying assist c/ gait belt   Interdisciplinary Plan of Care Collaboration   IDT Collaboration with  Occupational Therapist;Family / Caregiver   Patient Position at End of Therapy Seated;Other (Comments)  (handoff to OT for next session)   Collaboration Comments FT to review progress to date and safety in home   PT DME Recommendations   Wheelchair 18\" Width;Jaciel Height (16\"-17\");Lightweight;Removable/Flip Back Armrests;Standard Leg Rests;Anti-tippers   Cushion Standard   Assistive Device Front Wheeled Walker   Physical Therapist Assigned   Assigned PT / Treatment Time / Comments Christina. 60-90   Roll Left and Right   Assistance Needed Independent   CARE Score - Roll Left and Right 6   Sit to Lying   Assistance Needed Independent   CARE Score - Sit to Lying 6   Lying to Stting on Side of Bed   Assistance Needed Independent   CARE Score - Lying to Sitting on Side of Bed 6   Sit to Stand   Assistance Needed Supervision;Incidental touching   CARE Score - Sit to Stand 4   Chair/Bed-to-Chair Transfer   Assistance Needed Incidental touching;Verbal cues   CARE Score - Chair/Bed-to-Chair Transfer 4   Toilet Transfer   Assistance Needed Incidental touching;Verbal cues   CARE Score - Toilet Transfer 4   Car Transfer   Assistance Needed Supervision;Incidental touching   CARE Score - Car Transfer 4   Walk 10 Feet   Assistance Needed Incidental touching;Adaptive equipment   Physical Assistance Level No physical assistance   CARE Score - Walk 10 Feet 4   Walk 50 Feet with Two Turns   Assistance Needed Physical assistance;Adaptive equipment   Physical Assistance Level 26%-50%   CARE Score - Walk 50 Feet with Two Turns 3 "   Walk 150 Feet   Assistance Needed Physical assistance;Adaptive equipment   Physical Assistance Level 25% or less   CARE Score - Walk 150 Feet 3   Walking 10 Feet on Uneven Surfaces   Assistance Needed Physical assistance;Verbal cues;Adaptive equipment   Physical Assistance Level 25% or less   CARE Score - Walking 10 Feet on Uneven Surfaces 3   1 Step (Curb)   Assistance Needed Physical assistance;Adaptive equipment   Physical Assistance Level 25% or less   CARE Score - 1 Step (Curb) 3   Wheel 50 Feet with Two Turns   Assistance Needed Independent   CARE Score - Wheel 50 Feet with Two Turns 6   Wheel 50 Feet   Assistance Needed Independent   CARE Score - Wheel 150 Feet 6     Session focused on reviewing progress to date c/ pt and family, recommendations that pt functions at w/c level in the home unless family member available to provide hands on assistance for walking due to fall risk and current level of effort needed for safe walking. Plan for ongoing assessment of DME and AFO needs givien pt's steady progress with mobility. Practiced level gait c/ FWW, 1 JULY, car transfers to [facility] as per pt's home environment. Plan to d/c to daughter's home next week.     Gait c/ BTB and FWW as above, metronome for rita cues.     Assessment    Latanya continues c/ progress in irta, step symmetry, and heel first contact with external facilitation. Able to detect heel first vs forefoot contact during IC on RLE despite ataxia leading to overshoot on swing. Recommend Turbomed xtern trial for AFO support.     Strengths: Able to follow instructions, Alert and oriented, Independent prior level of function, Motivated for self care and independence, Pleasant and cooperative, Supportive family, Willingly participates in therapeutic activities  Barriers: Decreased endurance, Difficulty following instructions, Fatigue, Hemiplegia, Home accessibility, Hypertension, Impaired balance, Impaired carryover of learning, Limited mobility (lives  "alone, decreased knowledge of condition, limited daytime family support)    Plan    Floor recovery  Continue gait and pregait without UE support vs FWW vs cane   AFO trial: Xtern   TM vs OG gait training c/ metronome for rhythm/rita  Pregait to gait with dec UE support- stepping and weight acceptance/stance stability  Push/pulls c/ resistance   Ankle extrinsic strength and coordination  Functional endurance and OOB time   Repeat FT c/ daughter prior to d/c     DME  PT DME Recommendations  Wheelchair: 18\" Width, Jaciel Height (16\"-17\"), Lightweight, Removable/Flip Back Armrests, Standard Leg Rests, Anti-tippers  Cushion: Standard  Assistive Device: Front Wheeled Walker  Additional Equipment: Other (Comments) (TBD)    Passport items to be completed:  Get in/out of bed safely, in/out of a vehicle, safely use mobility device, walk or wheel around home/community, navigate up and down stairs, show how to get up/down from the ground, ensure home is accessible, demonstrate HEP, complete caregiver training    Physical Therapy Problems (Active)       Problem: PT-Long Term Goals       Dates: Start:  05/30/24         Goal: LTG-By discharge, patient will ambulate >/= 300' c/ LRAD and CGA or better.        Dates: Start:  05/30/24            Goal: LTG-By discharge, patient will transfer one surface to another c/ Boni.        Dates: Start:  05/30/24            Goal: LTG-By discharge, patient will ambulate up/down 2 stairs c/ CGA or better to access home.        Dates: Start:  05/30/24            Goal: LTG-By discharge, patient will transfer in/out of a car c/ CGA or better.        Dates: Start:  05/30/24              "

## 2024-06-12 NOTE — PROGRESS NOTES
NURSING DAILY NOTE    Name: Latanya Ferrera   Date of Admission: 5/29/2024   Admitting Diagnosis: Hemorrhagic stroke (HCC)  Attending Physician: Sofi Jules D.o.  Allergies: Pineapple    Safety  Patient Assist  Min  Patient Precautions  Fall Risk  Precaution Comments  R hemiplegia, R loss of sensation in hand, ICH  Bed Transfer Status  Standby Assist  Toilet Transfer Status   Contact Guard Assist  Assistive Devices  Rails, Wheelchair  Oxygen  None - Room Air  Diet/Therapeutic Dining  Current Diet Order   Procedures    Diet Order Diet: Regular     Pill Administration  whole  Agitated Behavioral Scale     ABS Level of Severity       Fall Risk  Has the patient had a fall this admission?   No  Carly Fisher Fall Risk Scoring  15, HIGH RISK  Fall Risk Safety Measures  bed alarm, chair alarm, and poor balance    Vitals  Temperature: 36.4 °C (97.6 °F)  Temp src: Oral  Pulse: 81  Respiration: 20  Blood Pressure: 138/82  Blood Pressure MAP (Calculated): 101 MM HG  BP Location: Left, Upper Arm  Patient BP Position: Supine     Oxygen  Pulse Oximetry: 97 %  O2 (LPM): 0  O2 Delivery Device: None - Room Air    Bowel and Bladder  Last Bowel Movement  06/11/24  Stool Type  Type 5: Soft blob with clear cut edges (passed easily)  Bowel Device  Bathroom  Continent  Bladder: Did not void   Bowel: No movement  Bladder Function  Urine Void (mL):  (large)  Number of Times Voided: 1  Urinary Options: Yes  Urine Color: Unable To Evaluate  Genitourinary Assessment   Bladder Assessment (WDL):  Within Defined Limits  Ramirez Catheter: Not Applicable  Ramirez Reasons per MD Order: Acute urinary retention or bladder outlet obstruction  Ramirez Care: Given with Soap and Water  Urinary Elimination: Catheter (Document on LDA)  Urine Color: Unable To Evaluate  Bladder Device: Bathroom  Bladder Scan: Post Void  $ Bladder Scan Results (mL): 100  Bladder Medications: Yes    Skin  Axvier Score    17  Sensory Interventions   Bed Types: Standard/Trauma Mattress  Skin Preventative Measures: Waffle Overlay  Moisture Interventions  Moisturizers/Barriers: Barrier Wipes  Containment Devices: Indwelling Catheter      Pain  Pain Rating Scale  8 - Awful, hard to do anything  Pain Location  Leg  Pain Location Orientation  Right  Pain Interventions   Medication (see MAR)    ADLs    Bathing   Patient Refused Bathing (Family showered the other day as per patient)  Linen Change   Partial  Personal Hygiene     Chlorhexidine Bath      Oral Care  Brushed Teeth  Teeth/Dentures  Missing Teeth (Comments)  Shave     Nutrition Percentage Eaten  Between 50-75% Consumed  Environmental Precautions  Treaded Slipper Socks on Patient  Patient Turns/Positioning  Patient Turns Self from Side to Side  Patient Turns Assistance/Tolerance  Assistance of One, Tolerates Well  Bed Positions  Bed Controls On, Bed Locked  Head of Bed Elevated  Less than 30 degrees      Psychosocial/Neurologic Assessment  Psychosocial Assessment  Psychosocial (WDL):  Within Defined Limits  Patient Behaviors: Fatigue  Family Behaviors: No Family Present  Neurologic Assessment  Neuro (WDL): Exceptions to WDL  Level of Consciousness: Alert  Orientation Level: Oriented X4  Cognition: Follows commands, Appropriate attention/concentration  Speech: Clear  Pupil Assesment: No  R Pupil Size (mm): 3  R Pupil Shape / Description: Round  R Pupil Reaction: Brisk  L Pupil Size (mm): 3  L Pupil Shape / Description: Round  L Pupil Reaction: Brisk  Motor Function/Sensation Assessment: Sensation, Motor strength  RUE Sensation: Numbness  Muscle Strength Right Arm: Fair Strength against Gravity but No Resistance  LUE Sensation: Full sensation  Muscle Strength Left Arm: Normal Strength Against Gravity and Full Resistance  RLE Sensation: Numbness  Muscle Strength Right Leg: Fair Strength against Gravity but No Resistance  LLE Sensation: Full sensation  Muscle Strength Left Leg: Normal  Strength Against Gravity and Full Resistance  EENT (WDL):  Within Defined Limits    Cardio/Pulmonary Assessment  Edema   RLE Edema: Trace  LLE Edema: Trace  Respiratory Breath Sounds  RUL Breath Sounds: Clear  RML Breath Sounds: Clear  RLL Breath Sounds: Clear  OZIEL Breath Sounds: Clear  LLL Breath Sounds: Clear  Cardiac Assessment   Cardiac (WDL):  WDL Except

## 2024-06-12 NOTE — PROGRESS NOTES
NURSING DAILY NOTE    Name: Latanya Ferrera   Date of Admission: 5/29/2024   Admitting Diagnosis: Hemorrhagic stroke (HCC)  Attending Physician: Sofi Jules D.o.  Allergies: Pineapple    Safety  Patient Assist  Min  Patient Precautions  Fall Risk  Precaution Comments  R hemiplegia, R loss of sensation in hand, ICH  Bed Transfer Status  Standby Assist  Toilet Transfer Status   Contact Guard Assist  Assistive Devices  Rails, Wheelchair  Oxygen  None - Room Air  Diet/Therapeutic Dining  Current Diet Order   Procedures    Diet Order Diet: Regular     Pill Administration  whole  Agitated Behavioral Scale     ABS Level of Severity       Fall Risk  Has the patient had a fall this admission?   No  Carly Fisher Fall Risk Scoring  15, HIGH RISK  Fall Risk Safety Measures  bed alarm, chair alarm, and poor balance    Vitals  Temperature: 36.3 °C (97.3 °F)  Temp src: Temporal  Pulse: 84  Respiration: 18  Blood Pressure: 118/66  Blood Pressure MAP (Calculated): 83 MM HG  BP Location: Left, Upper Arm  Patient BP Position: Supine     Oxygen  Pulse Oximetry: 99 %  O2 (LPM): 0  O2 Delivery Device: None - Room Air    Bowel and Bladder  Last Bowel Movement  06/11/24  Stool Type  Type 5: Soft blob with clear cut edges (passed easily)  Bowel Device  Bathroom  Continent  Bladder: Did not void   Bowel: No movement  Bladder Function  Urine Void (mL):  (large)  Number of Times Voided: 1  Urinary Options: Yes  Urine Color: Unable To Evaluate  Genitourinary Assessment   Bladder Assessment (WDL):  Within Defined Limits  Ramirez Catheter: Not Applicable  Ramirez Reasons per MD Order: Acute urinary retention or bladder outlet obstruction  Ramirez Care: Given with Soap and Water  Urinary Elimination: Catheter (Document on LDA)  Urine Color: Unable To Evaluate  Bladder Device: Bathroom  Bladder Scan: Post Void  $ Bladder Scan Results (mL): 100  Bladder Medications: Yes    Skin  Xavier Score    17  Sensory Interventions   Bed Types: Standard/Trauma Mattress  Skin Preventative Measures: Waffle Overlay  Moisture Interventions  Moisturizers/Barriers: Barrier Wipes  Containment Devices: Indwelling Catheter      Pain  Pain Rating Scale  8 - Awful, hard to do anything  Pain Location  Leg  Pain Location Orientation  Right  Pain Interventions   Medication (see MAR)    ADLs    Bathing   Patient Refused Bathing (Family showered the other day as per patient)  Linen Change   Partial  Personal Hygiene     Chlorhexidine Bath      Oral Care  Brushed Teeth  Teeth/Dentures  Missing Teeth (Comments)  Shave     Nutrition Percentage Eaten  Between 50-75% Consumed  Environmental Precautions  Treaded Slipper Socks on Patient  Patient Turns/Positioning  Patient Turns Self from Side to Side  Patient Turns Assistance/Tolerance  Assistance of One, Tolerates Well  Bed Positions  Bed Controls On, Bed Locked  Head of Bed Elevated  Less than 30 degrees      Psychosocial/Neurologic Assessment  Psychosocial Assessment  Psychosocial (WDL):  Within Defined Limits  Patient Behaviors: Fatigue  Family Behaviors: No Family Present  Neurologic Assessment  Neuro (WDL): Exceptions to WDL  Level of Consciousness: Alert  Orientation Level: Oriented X4  Cognition: Follows commands, Appropriate attention/concentration  Speech: Clear  Pupil Assesment: No  R Pupil Size (mm): 3  R Pupil Shape / Description: Round  R Pupil Reaction: Brisk  L Pupil Size (mm): 3  L Pupil Shape / Description: Round  L Pupil Reaction: Brisk  Motor Function/Sensation Assessment: Sensation, Motor strength  RUE Sensation: Numbness  Muscle Strength Right Arm: Fair Strength against Gravity but No Resistance  LUE Sensation: Full sensation  Muscle Strength Left Arm: Normal Strength Against Gravity and Full Resistance  RLE Sensation: Numbness  Muscle Strength Right Leg: Fair Strength against Gravity but No Resistance  LLE Sensation: Full sensation  Muscle Strength Left Leg: Normal  Strength Against Gravity and Full Resistance  EENT (WDL):  Within Defined Limits    Cardio/Pulmonary Assessment  Edema   RLE Edema: Trace  LLE Edema: Trace  Respiratory Breath Sounds  RUL Breath Sounds: Clear  RML Breath Sounds: Clear  RLL Breath Sounds: Clear  OZIEL Breath Sounds: Clear  LLL Breath Sounds: Clear  Cardiac Assessment   Cardiac (WDL):  WDL Except

## 2024-06-12 NOTE — PROGRESS NOTES
"  Physical Medicine & Rehabilitation Progress Note    Encounter Date: 6/12/2024    Chief Complaint: Doing well     Interval Events (Subjective):  LAURA ALVAREZ 6/11  Voiding     Seen and examined in her room. She really cannot eat the food. She states that her family will be bringing her something to eat. She otherwise feels well and was able to walk again today which made her feel really good. She feels like her numbness going away and it's starting to wake up.       ROS: 14 point ROS negative unless otherwise specified in the HPI    Objective:  VITAL SIGNS: /72   Pulse 75   Temp 36.3 °C (97.3 °F) (Temporal)   Resp 18   Ht 1.676 m (5' 6\")   Wt 81.6 kg (180 lb)   SpO2 99%   BMI 29.05 kg/m²     GEN: No apparent distress  HEENT: Head normocephalic, atraumatic.  Sclera nonicteric bilaterally, no ocular discharge appreciated bilaterally.  CV: Extremities warm and well-perfused, no peripheral edema appreciated bilaterally.  PULMONARY: Breathing nonlabored on room air, no respiratory accessory muscle use.  Not requiring supplemental oxygen.  ABD: Soft, nontender. Slightly protuberant.   SKIN: No appreciable skin breakdown on exposed areas of skin.  PSYCH: Mood and affect within normal limits.  NEURO: Awake alert.  Conversational.  Logical thought content. Improving R hemiparesis.     Laboratory Values:  Recent Results (from the past 72 hour(s))   CBC WITH DIFFERENTIAL    Collection Time: 06/10/24  5:27 AM   Result Value Ref Range    WBC 10.7 4.8 - 10.8 K/uL    RBC 3.64 (L) 4.20 - 5.40 M/uL    Hemoglobin 10.5 (L) 12.0 - 16.0 g/dL    Hematocrit 34.3 (L) 37.0 - 47.0 %    MCV 94.2 81.4 - 97.8 fL    MCH 28.8 27.0 - 33.0 pg    MCHC 30.6 (L) 32.2 - 35.5 g/dL    RDW 45.1 35.9 - 50.0 fL    Platelet Count 285 164 - 446 K/uL    MPV 10.4 9.0 - 12.9 fL    Neutrophils-Polys 75.20 (H) 44.00 - 72.00 %    Lymphocytes 12.90 (L) 22.00 - 41.00 %    Monocytes 7.00 0.00 - 13.40 %    Eosinophils 3.40 0.00 - 6.90 %    Basophils 0.40 0.00 " - 1.80 %    Immature Granulocytes 1.10 (H) 0.00 - 0.90 %    Nucleated RBC 0.00 0.00 - 0.20 /100 WBC    Neutrophils (Absolute) 8.02 (H) 1.82 - 7.42 K/uL    Lymphs (Absolute) 1.37 1.00 - 4.80 K/uL    Monos (Absolute) 0.75 0.00 - 0.85 K/uL    Eos (Absolute) 0.36 0.00 - 0.51 K/uL    Baso (Absolute) 0.04 0.00 - 0.12 K/uL    Immature Granulocytes (abs) 0.12 (H) 0.00 - 0.11 K/uL    NRBC (Absolute) 0.00 K/uL   Comp Metabolic Panel    Collection Time: 06/10/24  5:27 AM   Result Value Ref Range    Sodium 140 135 - 145 mmol/L    Potassium 4.0 3.6 - 5.5 mmol/L    Chloride 106 96 - 112 mmol/L    Co2 21 20 - 33 mmol/L    Anion Gap 13.0 7.0 - 16.0    Glucose 106 (H) 65 - 99 mg/dL    Bun 16 8 - 22 mg/dL    Creatinine 0.63 0.50 - 1.40 mg/dL    Calcium 8.9 8.5 - 10.5 mg/dL    Correct Calcium 9.1 8.5 - 10.5 mg/dL    AST(SGOT) 21 12 - 45 U/L    ALT(SGPT) 44 2 - 50 U/L    Alkaline Phosphatase 85 30 - 99 U/L    Total Bilirubin 0.3 0.1 - 1.5 mg/dL    Albumin 3.7 3.2 - 4.9 g/dL    Total Protein 6.6 6.0 - 8.2 g/dL    Globulin 2.9 1.9 - 3.5 g/dL    A-G Ratio 1.3 g/dL   ESTIMATED GFR    Collection Time: 06/10/24  5:27 AM   Result Value Ref Range    GFR (CKD-EPI) 104 >60 mL/min/1.73 m 2       Medications:  Scheduled Medications   Medication Dose Frequency    lisinopril  80 mg Q DAY    hydrALAZINE  75 mg 4X/DAY    gabapentin  400 mg TID    benzonatate  100 mg TID    vitamin D3  5,000 Units DAILY    enoxaparin (LOVENOX) injection  40 mg DAILY AT 1800    Pharmacy Consult Request  1 Each PHARMACY TO DOSE    omeprazole  20 mg DAILY    amLODIPine  10 mg Q DAY     PRN medications: docusate sodium, senna-docusate **AND** polyethylene glycol/lytes, oxyCODONE immediate-release, Respiratory Therapy Consult, hydrALAZINE, bisacodyl, magnesium hydroxide, acetaminophen, carboxymethylcellulose, benzocaine-menthol, mag hydrox-al hydrox-simeth, ondansetron **OR** ondansetron, traZODone, sodium chloride, ipratropium-albuterol, melatonin    Diet:  Current Diet  Order   Procedures    Diet Order Diet: Regular     CTH 5/24/24 [facility]      [facility]H 6/5/24 [facility]      Study is compared to prior from 5/28/2024. Since the previous study the left basal ganglia/thalamic hemorrhage has decreased in size. Previously was 1.7 x 1.3 cm and currently 1.2 x 1.0 cm. Surrounding vasogenic edema is similar.       Medical Decision Making and Plan:  Left Basal Ganglia ICH secondary to hypertensive emergency  Right Hemiparesis  PT and OT for mobility and ADLs. Per guidelines, 15 hours per week between PT, OT and/or SLP.  Follow-up Neurology (Dr. Babb)  Secondary stroke ppx: Anti-hypertensives    6/12 BP finally controlled and < 140 SBP    Headache  Neck Pain  6/3 VSS. No other symptoms  6/4 Resolved. Tylenol relieved her symptoms. No change in neuro status.   6/5 Continues to have headache which returned last night severely. Recently started on DVT ppx. STAT CTH to ensure no expansion of bleed. CTH showing subacute ICH. D/w rads directly 6/5/2024 3:05 PM - not concerned for it being acute, looks older, but will get [facility] for formal comparison. Hold Lovenox for now to error on side of caution, recent US BLE neg for DVT.   6/6 Oxycodone 2.5mg daily PRN headache     Hypertension  Continue Hydralazine 50mg q6 hours --> 6/5 75mg every 6 hours  Continue Lisinopril 40mg daily --> 6/7 60mg daily --> 6/10 Increase to 80 mg daily   Continue Amlodipine 10mg daily   6/12 BP controlled      Leukocytosis  Improving on admission but unclear cause  Continue Rocephin x 2 doses (through 5/31) - ok to d/c IV after  6/10 stable 10.7     Urinary Retention  Has otero - required CIC   Resolved. Off of flomax    Chronic Cough  Secondary to years of smoking. D/w patient and daughter, nothing has changed in terms of her cough, nothing new or concerning to them.   Tessalon perls scheduled TID    Hyperphosphatemia  6/3 Resolved, Ph normal     Anemia  6/10 Stable in 10's     Neuropathic Pain  6/3 Continue Gabapentin - increase to  400mg TID --> 6/10 600mg TID     Bowel   6/10 Having regular bowel movements     Upcoming Labs/imagin/13     DVT PROPHYLAXIS: None - Hemorrhagic stroke. Check US - negative. Per Neuro note, clear for DVT chemo-ppx once MRI completed (completed ). Lovenox 40 mg SQ started 6/3. Hold Lovenox 2024 until can formally compare CTH but per d/w radiologist directly low c/f acute bleed as appears older. Reviewed personally and no acute finding on new CTH.  Restart Lovenox ppx.      HOSPITALIST FOLLOWING: No     CODE STATUS: FULL CODE     DISPO: Home with minimal support     VIANCA: 24 - Requesting extension as patient making really good progress     MEDS SENT TO: Not [facility]     DISCHARGE SPECIALIST FOLLOW UP: Neurology     Patient to scheduled follow up with their PCP within 2 weeks from discharge from the [facility].     Dr. Sofi Jules DO, MS  Department of Physical Medicine & Rehabilitation

## 2024-06-12 NOTE — THERAPY
Physical Therapy   Daily Treatment     Patient Name: Latanya Ferrera  Age:  58 y.o., Sex:  female  Medical Record #: [id number]  Today's Date: 6/12/2024     Precautions  Precautions: Fall Risk  Comments: R hemiplegia, R loss of sensation in hand, ICH    Subjective    Pt motivated to participate, requested to use restroom toward end of session     Objective       06/12/24 1501   PT Charge Group   PT Neuromuscular Re-Education / Balance (Units) 1   PT Therapeutic Activities (Units) 1   PT Total Time Spent   PT Individual Total Time Spent (Mins) 30   Pain 0 - 10 Group   Location Abdomen   Location Orientation Right   Description Sharp   Transfer Functional Level of Assist   Bed, Chair, Wheelchair Transfer Contact Guard Assist   Bed Chair Wheelchair Transfer Description Supervision for safety;Verbal cueing;Set-up of equipment;Initial preparation for task;Increased time  (stand step)   Toilet Transfers Contact Guard Assist   Toilet Transfer Description Adaptive equipment;Grab bar;Supervision for safety;Verbal cueing   Bed Mobility    Sit to Stand Standby Assist   Neuro-Muscular Treatments   Neuro-Muscular Treatments Anterior weight shift;Co-Contraction;Postural Facilitation;Tactile Cuing;Sequencing;Verbal Cuing   Comments see note   Interdisciplinary Plan of Care Collaboration   IDT Collaboration with  Physical Therapist   Patient Position at End of Therapy Seated;Edge of Bed;Call Light within Reach;Tray Table within Reach;Phone within Reach;Bed Alarm On   Collaboration Comments POC     PNF Neuro re-ed:  - Hooklying UE extension/adduction straight arm x15 reps ea with good abdominal recruitment  - DL bridges with approximation through femurs and manual cues for hip abd recruitment x15  - SL bridge with contralateral fig 4 and traction to facilitate hip ext 2x8  - Attempted prone R hamstring curl though discontinued due to abdominal discomfort  - Prone>quadruped>tall kneel with B UE support on peanut ball, tall kneel sitbacks  "x10 with emphasis on maintaining midline and hip extensor activation  - Standing R hamstring curl x5 reps with B UE support on mat    Assisted with toileting per pt request, continent void of bladder with set up for be care and CGA for pants management    Educated pt on performing slow, intentional movements with transfers and fxl mobility throughout the day to improve coordination    Assessment    Pt demonstrated fair trunk control and abdominal recruitment with closed chain mat exercises. When level of challenge was increased with more difficult developmental postures, pt required steadying assist and multimodal cues for trunk stabilization. When distracted or fatigued, pt continues to move quickly with impaired coordination.  Strengths: Able to follow instructions, Alert and oriented, Independent prior level of function, Motivated for self care and independence, Pleasant and cooperative, Supportive family, Willingly participates in therapeutic activities  Barriers: Decreased endurance, Difficulty following instructions, Fatigue, Hemiplegia, Home accessibility, Hypertension, Impaired balance, Impaired carryover of learning, Limited mobility (lives alone, decreased knowledge of condition, limited daytime family support)    Plan    Floor recovery  Continue gait and pregait without UE support vs FWW vs cane   AFO trial: Xtern   TM vs OG gait training c/ metronome for rhythm/rita  Pregait to gait with dec UE support- stepping and weight acceptance/stance stability  Push/pulls c/ resistance   Ankle extrinsic strength and coordination  Functional endurance and OOB time   Repeat FT c/ daughter prior to d/c     DME  PT DME Recommendations  Wheelchair: 18\" Width, Jaciel Height (16\"-17\"), Lightweight, Removable/Flip Back Armrests, Standard Leg Rests, Anti-tippers  Cushion: Standard  Assistive Device: Front Wheeled Walker  Additional Equipment: Other (Comments) (TBD)    Passport items to be completed:  Get in/out of bed " safely, in/out of a vehicle, safely use mobility device, walk or wheel around home/community, navigate up and down stairs, show how to get up/down from the ground, ensure home is accessible, demonstrate HEP, complete caregiver training    Physical Therapy Problems (Active)       Problem: PT-Long Term Goals       Dates: Start:  05/30/24         Goal: LTG-By discharge, patient will ambulate >/= 300' c/ LRAD and CGA or better.        Dates: Start:  05/30/24            Goal: LTG-By discharge, patient will transfer one surface to another c/ Boni.        Dates: Start:  05/30/24            Goal: LTG-By discharge, patient will ambulate up/down 2 stairs c/ CGA or better to access home.        Dates: Start:  05/30/24            Goal: LTG-By discharge, patient will transfer in/out of a car c/ CGA or better.        Dates: Start:  05/30/24

## 2024-06-12 NOTE — THERAPY
Speech Language Pathology  Daily Treatment     Patient Name: Latanya Ferrera  Age:  58 y.o., Sex:  female  Medical Record #: [id number]  Today's Date: 6/12/2024     Precautions  Precautions: Fall Risk  Comments: R hemiplegia, R loss of sensation in hand, ICH    Subjective    Family training completed with pt and pt's daughter.     Objective       06/12/24 0831   Treatment Charges   SLP Cognitive Skill Development First 15 Minutes 1   SLP Cognitive Skill Development Additional 15 Minutes 1   SLP Total Time Spent   SLP Individual Total Time Spent (Mins) 30         Assessment    Family training completed.  Reviewed pt's initial SCCAN scores showing mild deficits in the areas of memory and attention.  Reviewed strategies for memory, attention and organization once pt is back home.  Pt's daughter reported that she has noticed significant improvement in pt's memory and overall cognition since she has been in the hospital and feels as though she is at her baseline.  Pt's daughter stated that she will be able to assist pt with managing her medications at home.  They are both agreeable to reducing/discharging ST.      Strengths: Able to follow instructions, Alert and oriented, Effective communication skills, Motivated for self care and independence, Pleasant and cooperative, Willingly participates in therapeutic activities, Supportive family, Making steady progress towards goals  Barriers: Hemiparesis, Decreased endurance    Plan    Complete outcome assessment, determine appropriateness of reducing ST to 30 minutes  vs discharging ST.          Speech Therapy Problems (Active)       Problem: Memory STGs       Dates: Start:  05/30/24         Goal: STG-Within one week, patient will demonstrate use of memory strategies in order to recall information from therapies after a 2/hr delay.        Dates: Start:  05/30/24         Goal Note filed on 06/10/24 1145 by Mya Steven MS,CCC-SLP       Pt continues to benefit from repetitions and  reinforcement for novel information                 Problem: Speech/Swallowing LTGs       Dates: Start:  05/30/24         Goal: LTG-By discharge, patient will solve complex problems related to IADL's in order to d/c home with mod I        Dates: Start:  05/30/24

## 2024-06-12 NOTE — THERAPY
Speech Language Pathology  Daily Treatment     Patient Name: Latanya Ferrera  Age:  58 y.o., Sex:  female  Medical Record #: [id number]  Today's Date: 6/12/2024     Precautions  Precautions: Fall Risk  Comments: R hemiplegia, R loss of sensation in hand, ICH    Subjective    Patient was willing to participate in ST.      Objective       06/12/24 1102   Treatment Charges   SLP Cognitive Skill Development First 15 Minutes 1   SLP Cognitive Skill Development Additional 15 Minutes 1   SLP Total Time Spent   SLP Individual Total Time Spent (Mins) 30   Outcome Measures   Outcome Measures Utilized SCCAN   SCCAN (Scales of Cognitive and Communicative Ability for Neurorehabilitation)   Oral Expression - Raw Score 19   Oral Expression - Scale Performance Score 100   Orientation - Raw Score 12   Orientation - Scale Performance Score 100   Memory - Raw Score 17   Memory - Scale Performance Score 89   Speech Comprehension - Raw Score 12   Speech Comprehension - Scale Performance Score 92   Reading Comprehension - Raw Score 11   Reading Comprehension - Scale Performance Score 92   Writing - Raw Score 6   Writing - Scale Performance Score 86   Attention - Raw Score 16   Attention - Scale Performance Score 100   Problem Solving - Raw Score 22   Problem Solving - Scale Performance Score 96   SCCAN Total Raw Score 89   SCCAN Degree of Severity Typical Functioning   Interdisciplinary Plan of Care Collaboration   Patient Position at End of Therapy In Bed;Bed Alarm On;Call Light within Reach;Tray Table within Reach;Phone within Reach   Speech Language Pathologist Assigned   Assigned SLP / Treatment Time / Comments d/c ST 6/12/24         Assessment    Patient completed SCCAN with a final raw score of 89 indicating cognition WFL. Patient demonstrated improvements in all cognitive domains. Discussed findings with patient who reports baseline cognitive function. Daughter to assist with medication management as needed.     Strengths: Able to  follow instructions, Alert and oriented, Effective communication skills, Motivated for self care and independence, Pleasant and cooperative, Willingly participates in therapeutic activities, Supportive family, Making steady progress towards goals  Barriers: Hemiparesis, Decreased endurance    Plan    Discharge ST, all goals met      Speech Therapy Problems (Active)       Problem: Memory STGs       Dates: Start:  05/30/24         Goal: STG-Within one week, patient will demonstrate use of memory strategies in order to recall information from therapies after a 2/hr delay.        Dates: Start:  05/30/24         Goal Note filed on 06/10/24 1145 by Mya Steven MS,CCC-SLP       Pt continues to benefit from repetitions and reinforcement for novel information                 Problem: Speech/Swallowing LTGs       Dates: Start:  05/30/24         Goal: LTG-By discharge, patient will solve complex problems related to IADL's in order to d/c home with mod I        Dates: Start:  05/30/24

## 2024-06-12 NOTE — THERAPY
Occupational Therapy  Daily Treatment     Patient Name: Latanya Ferrera  Age:  58 y.o., Sex:  female  Medical Record #: [id number]  Today's Date: 6/12/2024     Precautions  Precautions: (P) Fall Risk  Comments: R hemiplegia, R loss of sensation in hand, ICH         Subjective    Patient received from PT.  Family present for family training.     Objective       06/12/24 1001   OT Charge Group   OT Therapy Activity (Units) 2   OT Total Time Spent   OT Individual Total Time Spent (Mins) 30   Precautions   Precautions Fall Risk   Cognition    Safety Awareness Impaired   Bed Mobility    Sit to Supine Modified Independent   Interdisciplinary Plan of Care Collaboration   IDT Collaboration with  Family / Caregiver;Physical Therapist   Patient Position at End of Therapy In Bed;Call Light within Reach;Tray Table within Reach;Phone within Reach;Bed Alarm On   Collaboration Comments Took over care from PT; daughter and grandson present for family training   Toileting Hygiene   Assistance Needed Set-up / clean-up;Supervision;Verbal cues;Adaptive equipment   Comment SBA with grab bar   Toilet Transfer   Assistance Needed Set-up / clean-up;Supervision;Verbal cues;Adaptive equipment   Comment SBA with and without grab bar     OT verbally reviewed patient's CLOF with ADLs, IADLs and bathroom transfers.  Patient demonstrated two w/c <> toilet transfers (one with grab bar and one without).  OT recommended installation of grab bars by toilets and in tub/shower.  Patient demonstrated dry tub/shower transfer.  She required min A for mobility from w/c <> bench, then supervision in/out of tub with bench.  Educated on where to purchase tub bench.  Recommended SBA/supervision for all adls and bathroom transfers at this time.  Recommended CGA/SBA for kitchen mobility if in standing position.  Discussed patient's decreased awareness to impaired R hand sensation when cleaning a hot pot after simple meal prep activity.      Assessment    Patient's  daughter had no further questions after training was complete.    Strengths: Able to follow instructions, Good insight into deficits/needs, Independent prior level of function, Manages pain appropriately, Motivated for self care and independence, Pleasant and cooperative, Supportive family, Willingly participates in therapeutic activities  Barriers: Decreased endurance, Hemiparesis, Home accessibility, Impaired activity tolerance, Impaired balance (Impaired coordination/motor control)    Plan    ADLs, safety with transfers, neuro re-ed R UE, standing tolerance/balance,  3x/week with therapy techs for adjunct therapy     DME  OT DME Recommendations  Bathroom Equipment: Tub Transfer Bench (Medicaid Only)  Additional Equipment: Other (Comments) (TBD)      Occupational Therapy Goals (Active)       Problem: Bathing       Dates: Start:  05/30/24         Goal: STG-Within one week, patient will bathe with MIN A and LRAD       Dates: Start:  05/30/24         Goal Note filed on 06/10/24 0943 by Fox Castro, OT/L       Has refused all bathing during OT; therefore not assessed                 Problem: Dressing       Dates: Start:  06/10/24         Goal: STG-Within one week, patient will dress LB with setup and supervised with grab bar for balance       Dates: Start:  06/10/24               Problem: Functional Transfers       Dates: Start:  06/10/24         Goal: STG-Within one week, patient will transfer to toilet with supervision using grab bar for support       Dates: Start:  06/10/24               Problem: OT Long Term Goals       Dates: Start:  05/30/24         Goal: LTG-By discharge, patient will complete basic self care tasks with SPV-MOD I and LRAD       Dates: Start:  05/30/24            Goal: LTG-By discharge, patient will perform bathroom transfers with SPV-MOD I and LRAD       Dates: Start:  05/30/24               Problem: Toileting       Dates: Start:  06/10/24         Goal: STG-Within one week, patient  will complete toileting tasks with supervision using grab bar for balance       Dates: Start:  06/10/24

## 2024-06-13 ENCOUNTER — APPOINTMENT (OUTPATIENT)
Dept: PHYSICAL THERAPY | Facility: REHABILITATION | Age: 59
DRG: 057 | End: 2024-06-13
Attending: PHYSICAL MEDICINE & REHABILITATION
Payer: MEDICAID

## 2024-06-13 ENCOUNTER — APPOINTMENT (OUTPATIENT)
Dept: OCCUPATIONAL THERAPY | Facility: REHABILITATION | Age: 59
DRG: 057 | End: 2024-06-13
Attending: PHYSICAL MEDICINE & REHABILITATION
Payer: MEDICAID

## 2024-06-13 LAB
ANION GAP SERPL CALC-SCNC: 13 MMOL/L (ref 7–16)
BASOPHILS # BLD AUTO: 0.4 % (ref 0–1.8)
BASOPHILS # BLD: 0.04 K/UL (ref 0–0.12)
BUN SERPL-MCNC: 15 MG/DL (ref 8–22)
CALCIUM SERPL-MCNC: 9.1 MG/DL (ref 8.5–10.5)
CHLORIDE SERPL-SCNC: 105 MMOL/L (ref 96–112)
CO2 SERPL-SCNC: 21 MMOL/L (ref 20–33)
CREAT SERPL-MCNC: 0.65 MG/DL (ref 0.5–1.4)
EOSINOPHIL # BLD AUTO: 0.47 K/UL (ref 0–0.51)
EOSINOPHIL NFR BLD: 4.3 % (ref 0–6.9)
ERYTHROCYTE [DISTWIDTH] IN BLOOD BY AUTOMATED COUNT: 44.9 FL (ref 35.9–50)
GFR SERPLBLD CREATININE-BSD FMLA CKD-EPI: 103 ML/MIN/1.73 M 2
GLUCOSE SERPL-MCNC: 101 MG/DL (ref 65–99)
HCT VFR BLD AUTO: 33.3 % (ref 37–47)
HGB BLD-MCNC: 10.5 G/DL (ref 12–16)
IMM GRANULOCYTES # BLD AUTO: 0.14 K/UL (ref 0–0.11)
IMM GRANULOCYTES NFR BLD AUTO: 1.3 % (ref 0–0.9)
LYMPHOCYTES # BLD AUTO: 1.24 K/UL (ref 1–4.8)
LYMPHOCYTES NFR BLD: 11.4 % (ref 22–41)
MCH RBC QN AUTO: 29.5 PG (ref 27–33)
MCHC RBC AUTO-ENTMCNC: 31.5 G/DL (ref 32.2–35.5)
MCV RBC AUTO: 93.5 FL (ref 81.4–97.8)
MONOCYTES # BLD AUTO: 0.76 K/UL (ref 0–0.85)
MONOCYTES NFR BLD AUTO: 7 % (ref 0–13.4)
NEUTROPHILS # BLD AUTO: 8.19 K/UL (ref 1.82–7.42)
NEUTROPHILS NFR BLD: 75.6 % (ref 44–72)
NRBC # BLD AUTO: 0 K/UL
NRBC BLD-RTO: 0 /100 WBC (ref 0–0.2)
PLATELET # BLD AUTO: 255 K/UL (ref 164–446)
PMV BLD AUTO: 11 FL (ref 9–12.9)
POTASSIUM SERPL-SCNC: 4 MMOL/L (ref 3.6–5.5)
RBC # BLD AUTO: 3.56 M/UL (ref 4.2–5.4)
SODIUM SERPL-SCNC: 139 MMOL/L (ref 135–145)
WBC # BLD AUTO: 10.8 K/UL (ref 4.8–10.8)

## 2024-06-13 PROCEDURE — 99232 SBSQ HOSP IP/OBS MODERATE 35: CPT | Performed by: PHYSICAL MEDICINE & REHABILITATION

## 2024-06-13 PROCEDURE — 85025 COMPLETE CBC W/AUTO DIFF WBC: CPT

## 2024-06-13 PROCEDURE — 97530 THERAPEUTIC ACTIVITIES: CPT

## 2024-06-13 PROCEDURE — 700102 HCHG RX REV CODE 250 W/ 637 OVERRIDE(OP): Performed by: PHYSICAL MEDICINE & REHABILITATION

## 2024-06-13 PROCEDURE — 97110 THERAPEUTIC EXERCISES: CPT

## 2024-06-13 PROCEDURE — 97112 NEUROMUSCULAR REEDUCATION: CPT | Mod: CQ

## 2024-06-13 PROCEDURE — A9270 NON-COVERED ITEM OR SERVICE: HCPCS | Performed by: PHYSICAL MEDICINE & REHABILITATION

## 2024-06-13 PROCEDURE — 80048 BASIC METABOLIC PNL TOTAL CA: CPT

## 2024-06-13 PROCEDURE — 36415 COLL VENOUS BLD VENIPUNCTURE: CPT

## 2024-06-13 PROCEDURE — 97535 SELF CARE MNGMENT TRAINING: CPT

## 2024-06-13 PROCEDURE — 97110 THERAPEUTIC EXERCISES: CPT | Mod: CQ

## 2024-06-13 PROCEDURE — 97530 THERAPEUTIC ACTIVITIES: CPT | Mod: CQ

## 2024-06-13 PROCEDURE — 770010 HCHG ROOM/CARE - REHAB SEMI PRIVAT*

## 2024-06-13 PROCEDURE — 97116 GAIT TRAINING THERAPY: CPT | Mod: CQ

## 2024-06-13 PROCEDURE — 700111 HCHG RX REV CODE 636 W/ 250 OVERRIDE (IP): Mod: JZ | Performed by: PHYSICAL MEDICINE & REHABILITATION

## 2024-06-13 RX ORDER — GABAPENTIN 300 MG/1
600 CAPSULE ORAL 3 TIMES DAILY
Status: DISCONTINUED | OUTPATIENT
Start: 2024-06-13 | End: 2024-06-19

## 2024-06-13 RX ADMIN — GABAPENTIN 600 MG: 300 CAPSULE ORAL at 20:32

## 2024-06-13 RX ADMIN — OXYCODONE 2.5 MG: 5 TABLET ORAL at 20:33

## 2024-06-13 RX ADMIN — HYDRALAZINE HYDROCHLORIDE 75 MG: 50 TABLET ORAL at 14:13

## 2024-06-13 RX ADMIN — ENOXAPARIN SODIUM 40 MG: 100 INJECTION SUBCUTANEOUS at 17:03

## 2024-06-13 RX ADMIN — BENZONATATE 100 MG: 100 CAPSULE ORAL at 14:13

## 2024-06-13 RX ADMIN — OXYCODONE 2.5 MG: 5 TABLET ORAL at 14:15

## 2024-06-13 RX ADMIN — GABAPENTIN 400 MG: 400 CAPSULE ORAL at 08:18

## 2024-06-13 RX ADMIN — HYDRALAZINE HYDROCHLORIDE 75 MG: 50 TABLET ORAL at 17:02

## 2024-06-13 RX ADMIN — LISINOPRIL 80 MG: 20 TABLET ORAL at 05:32

## 2024-06-13 RX ADMIN — Medication 5000 UNITS: at 08:18

## 2024-06-13 RX ADMIN — BENZONATATE 100 MG: 100 CAPSULE ORAL at 08:17

## 2024-06-13 RX ADMIN — GABAPENTIN 600 MG: 300 CAPSULE ORAL at 14:13

## 2024-06-13 RX ADMIN — ACETAMINOPHEN 500 MG: 500 TABLET, FILM COATED ORAL at 14:16

## 2024-06-13 RX ADMIN — HYDRALAZINE HYDROCHLORIDE 75 MG: 50 TABLET ORAL at 20:32

## 2024-06-13 RX ADMIN — ACETAMINOPHEN 500 MG: 500 TABLET, FILM COATED ORAL at 05:31

## 2024-06-13 RX ADMIN — HYDRALAZINE HYDROCHLORIDE 75 MG: 50 TABLET ORAL at 08:18

## 2024-06-13 RX ADMIN — BENZONATATE 100 MG: 100 CAPSULE ORAL at 20:32

## 2024-06-13 RX ADMIN — AMLODIPINE BESYLATE 10 MG: 5 TABLET ORAL at 05:32

## 2024-06-13 RX ADMIN — OMEPRAZOLE 20 MG: 20 CAPSULE, DELAYED RELEASE ORAL at 08:17

## 2024-06-13 ASSESSMENT — GAIT ASSESSMENTS
DISTANCE (FEET): 160
GAIT LEVEL OF ASSIST: CONTACT GUARD ASSIST
ASSISTIVE DEVICE: FRONT WHEEL WALKER
ASSISTIVE DEVICE: FRONT WHEEL WALKER
GAIT LEVEL OF ASSIST: CONTACT GUARD ASSIST
DEVIATION: DECREASED TOE OFF;DECREASED HEEL STRIKE;DECREASED BASE OF SUPPORT

## 2024-06-13 ASSESSMENT — ACTIVITIES OF DAILY LIVING (ADL)
BED_CHAIR_WHEELCHAIR_TRANSFER_DESCRIPTION: SUPERVISION FOR SAFETY;VERBAL CUEING
TUB_SHOWER_TRANSFER_DESCRIPTION: GRAB BAR;SHOWER BENCH;SET-UP OF EQUIPMENT;SUPERVISION FOR SAFETY
BED_CHAIR_WHEELCHAIR_TRANSFER_DESCRIPTION: ADAPTIVE EQUIPMENT;SET-UP OF EQUIPMENT;SUPERVISION FOR SAFETY;VERBAL CUEING
BED_CHAIR_WHEELCHAIR_TRANSFER_DESCRIPTION: SET-UP OF EQUIPMENT;SUPERVISION FOR SAFETY
BED_CHAIR_WHEELCHAIR_TRANSFER_DESCRIPTION: ADAPTIVE EQUIPMENT;SQUAT PIVOT TRANSFER TO WHEELCHAIR

## 2024-06-13 ASSESSMENT — PAIN DESCRIPTION - PAIN TYPE: TYPE: ACUTE PAIN

## 2024-06-13 NOTE — DISCHARGE PLANNING
KAVIN spoke with RN KAVIN Saldana at [facility].  She approved add'l days to 6/20/24.

## 2024-06-13 NOTE — PROGRESS NOTES
NURSING DAILY NOTE    Name: Latanya Ferrera   Date of Admission: 5/29/2024   Admitting Diagnosis: Hemorrhagic stroke (HCC)  Attending Physician: Sofi Jules D.o.  Allergies: Pineapple    Safety  Patient Assist  Min  Patient Precautions  Fall Risk  Precaution Comments  R hemiplegia, R loss of sensation in hand, ICH  Bed Transfer Status  Contact Guard Assist  Toilet Transfer Status   Contact Guard Assist  Assistive Devices  Rails, Wheelchair  Oxygen  None - Room Air  Diet/Therapeutic Dining  Current Diet Order   Procedures    Diet Order Diet: Regular     Pill Administration  whole  Agitated Behavioral Scale     ABS Level of Severity       Fall Risk  Has the patient had a fall this admission?   No  Carly Fisher Fall Risk Scoring  15, HIGH RISK  Fall Risk Safety Measures  bed alarm and chair alarm    Vitals  Temperature: 36.9 °C (98.5 °F)  Temp src: Oral  Pulse: 86  Respiration: 18  Blood Pressure: 132/77  Blood Pressure MAP (Calculated): 95 MM HG  BP Location: Left, Upper Arm  Patient BP Position: Supine     Oxygen  Pulse Oximetry: 99 %  O2 (LPM): 0  O2 Delivery Device: None - Room Air    Bowel and Bladder  Last Bowel Movement  06/11/24  Stool Type  Type 5: Soft blob with clear cut edges (passed easily)  Bowel Device  Bathroom  Continent  Bladder: Did not void   Bowel: No movement  Bladder Function  Urine Void (mL):  (large)  Number of Times Voided: 1  Urinary Options: Yes  Urine Color: Yellow  Genitourinary Assessment   Bladder Assessment (WDL):  Within Defined Limits  Ramirez Catheter: Not Applicable  Ramirez Reasons per MD Order: Acute urinary retention or bladder outlet obstruction  Ramirez Care: Given with Soap and Water  Urinary Elimination: Catheter (Document on LDA)  Urine Color: Yellow  Bladder Device: Bathroom  Bladder Scan: Post Void  $ Bladder Scan Results (mL): 100  Bladder Medications: Yes    Skin  Xavier Score   17  Sensory Interventions   Bed  Types: Standard/Trauma Mattress  Skin Preventative Measures: Waffle Overlay  Moisture Interventions  Moisturizers/Barriers: Barrier Wipes  Containment Devices: Indwelling Catheter      Pain  Pain Rating Scale  6 - Hard to ignore, avoid usual activities  Pain Location  Leg, Arm  Pain Location Orientation  Right  Pain Interventions   Medication (see MAR)    ADLs    Bathing   Patient Refused Bathing (Family showered the other day as per patient)  Linen Change   Partial  Personal Hygiene     Chlorhexidine Bath      Oral Care  Brushed Teeth  Teeth/Dentures  Missing Teeth (Comments)  Shave     Nutrition Percentage Eaten  Between % Consumed  Environmental Precautions  Treaded Slipper Socks on Patient, Personal Belongings, Wastebasket, Call Bell etc. in Easy Reach, Transferred to Stronger Side, Report Given to Other Health Care Providers Regarding Fall Risk, Bed in Low Position, Communication Sign for Patients & Families, Mobility Assessed & Appropriate Sign Placed  Patient Turns/Positioning  Patient Turns Self from Side to Side  Patient Turns Assistance/Tolerance  Assistance of One  Bed Positions  Bed Locked, Bed Controls On  Head of Bed Elevated  Self regulated      Psychosocial/Neurologic Assessment  Psychosocial Assessment  Psychosocial (WDL):  Within Defined Limits  Patient Behaviors: Fatigue  Family Behaviors: No Family Present  Neurologic Assessment  Neuro (WDL): Exceptions to WDL  Level of Consciousness: Alert  Orientation Level: Oriented X4  Cognition: Follows commands, Appropriate attention/concentration  Speech: Clear  Pupil Assesment: No  R Pupil Size (mm): 3  R Pupil Shape / Description: Round  R Pupil Reaction: Brisk  L Pupil Size (mm): 3  L Pupil Shape / Description: Round  L Pupil Reaction: Brisk  Motor Function/Sensation Assessment: Sensation, Motor strength  RUE Sensation: Numbness  Muscle Strength Right Arm: Fair Strength against Gravity but No Resistance  LUE Sensation: Full sensation  Muscle  Strength Left Arm: Normal Strength Against Gravity and Full Resistance  RLE Sensation: Numbness  Muscle Strength Right Leg: Fair Strength against Gravity but No Resistance  LLE Sensation: Full sensation  Muscle Strength Left Leg: Normal Strength Against Gravity and Full Resistance  EENT (WDL):  Within Defined Limits    Cardio/Pulmonary Assessment  Edema   RLE Edema: Trace  LLE Edema: Trace  Respiratory Breath Sounds  RUL Breath Sounds: Clear  RML Breath Sounds: Clear  RLL Breath Sounds: Clear  OZIEL Breath Sounds: Clear  LLL Breath Sounds: Clear  Cardiac Assessment   Cardiac (WDL):  WDL Except

## 2024-06-13 NOTE — THERAPY
"Physical Therapy   Daily Treatment     Patient Name: Latanya Ferrera  Age:  58 y.o., Sex:  female  Medical Record #: [id number]  Today's Date: 6/13/2024     Precautions  Precautions: Fall Risk  Comments: R hemiplegia, R loss of sensation in hand, ICH    Subjective    \"I'm too tired to do the treadmill right now\"     Objective       06/13/24 0701   PT Charge Group   PT Gait Training (Units) 1   PT Therapeutic Exercise (Units) 1   PT Neuromuscular Re-Education / Balance (Units) 1   PT Therapeutic Activities (Units) 1   PT Total Time Spent   PT Individual Total Time Spent (Mins) 60   Gait Functional Level of Assist    Gait Level Of Assist Contact Guard Assist   Assistive Device Front Wheel Walker  (xtern brace R LE)   # of Times Distance was Traveled 1   Deviation Decreased Heel Strike;Ataxic;Decreased Base Of Support  (overshoot RLE swing c/ delayed anterior WS and weight acceptance)   Transfer Functional Level of Assist   Bed, Chair, Wheelchair Transfer Standby Assist   Bed Chair Wheelchair Transfer Description Supervision for safety;Verbal cueing  (stand step transfer with bed rail)   Bed Mobility    Supine to Sit Supervised   Sit to Supine Modified Independent   Sit to Stand Standby Assist   Scooting Supervised   Rolling Supervised   Neuro-Muscular Treatments   Neuro-Muscular Treatments Anterior weight shift;Compensatory Strategies;Joint Approximation;Weight Shift Right;Verbal Cuing;Sequencing   Comments see note     Standing balance/NRE:  Fwrd chest press, OH chest press, trunk rotation, normal DEMI with 2lb weighted ball with hand straps, 2 sets x 10 reps   Fwrd chest press, OH chest press, trunk rotation, normal DEMI with 2lb weighted ball with hand straps, 2 sets x 10 reps with L LE on half foam roller, forced wbing through R LE, vc/tc for inc hip extension and knee control  Ladder ball standing on air ex pad    Gait training:  Gait training with xtern brace on R LE, FWW with R hand 55' x 1 with CGA, tc and manual " "cues for inc hip extension and knee control    Assessment    Pt tolerated dynamic balance activities in // with minimal LOB and with delayed, but improving postural reactions Pt continues to hyperextend her R knee in stance as she fatigues. Pt's R M/L ankle stability improved with x-tern brace   Strengths: Able to follow instructions, Alert and oriented, Independent prior level of function, Motivated for self care and independence, Pleasant and cooperative, Supportive family, Willingly participates in therapeutic activities  Barriers: Decreased endurance, Difficulty following instructions, Fatigue, Hemiplegia, Home accessibility, Hypertension, Impaired balance, Impaired carryover of learning, Limited mobility (lives alone, decreased knowledge of condition, limited daytime family support)    Plan    Floor recovery  Continue gait and pregait without UE support vs FWW vs cane   AFO trial: Xtern   TM vs OG gait training c/ metronome for rhythm/rita  Pregait to gait with dec UE support- stepping and weight acceptance/stance stability  Push/pulls c/ resistance   Ankle extrinsic strength and coordination  Functional endurance and OOB time   Repeat FT c/ daughter prior to d/c     DME  PT DME Recommendations  Wheelchair: 18\" Width, Jaciel Height (16\"-17\"), Lightweight, Removable/Flip Back Armrests, Standard Leg Rests, Anti-tippers  Cushion: Standard  Assistive Device: Front Wheeled Walker  Additional Equipment: Other (Comments) (TBD)    Passport items to be completed:  Get in/out of bed safely, in/out of a vehicle, safely use mobility device, walk or wheel around home/community, navigate up and down stairs, show how to get up/down from the ground, ensure home is accessible, demonstrate HEP, complete caregiver training    Physical Therapy Problems (Active)       Problem: PT-Long Term Goals       Dates: Start:  05/30/24         Goal: LTG-By discharge, patient will ambulate >/= 300' c/ LRAD and CGA or better.        Dates: " Start:  05/30/24            Goal: LTG-By discharge, patient will transfer one surface to another c/ Boni.        Dates: Start:  05/30/24            Goal: LTG-By discharge, patient will ambulate up/down 2 stairs c/ CGA or better to access home.        Dates: Start:  05/30/24            Goal: LTG-By discharge, patient will transfer in/out of a car c/ CGA or better.        Dates: Start:  05/30/24

## 2024-06-13 NOTE — PROGRESS NOTES
"  Physical Medicine & Rehabilitation Progress Note    Encounter Date: 6/13/2024    Chief Complaint: Doing better     Interval Events (Subjective):  LAURA ALVAREZ 6/12  Voiding     Seen and examined in her room. She is happy that her pressures are better. She has a popping in her ear. Not painful but feels like its trying to open up. Numbness has turned into burning sensation which is bothersome.       ROS: 14 point ROS negative unless otherwise specified in the HPI    Objective:  VITAL SIGNS: /74   Pulse 83   Temp 36.4 °C (97.5 °F) (Temporal)   Resp 18   Ht 1.676 m (5' 6\")   Wt 81.6 kg (180 lb)   SpO2 98%   BMI 29.05 kg/m²     GEN: No apparent distress  HEENT: Head normocephalic, atraumatic.  Sclera nonicteric bilaterally, no ocular discharge appreciated bilaterally.  CV: Extremities warm and well-perfused, no peripheral edema appreciated bilaterally.  PULMONARY: Breathing nonlabored on room air, no respiratory accessory muscle use.  Not requiring supplemental oxygen.  SKIN: No appreciable skin breakdown on exposed areas of skin.  PSYCH: Mood and affect within normal limits.  NEURO: Awake alert.  Conversational.  Logical thought content. Improving RUE and RLE hemiparesis.     Laboratory Values:  Recent Results (from the past 72 hour(s))   CBC WITH DIFFERENTIAL    Collection Time: 06/13/24  5:17 AM   Result Value Ref Range    WBC 10.8 4.8 - 10.8 K/uL    RBC 3.56 (L) 4.20 - 5.40 M/uL    Hemoglobin 10.5 (L) 12.0 - 16.0 g/dL    Hematocrit 33.3 (L) 37.0 - 47.0 %    MCV 93.5 81.4 - 97.8 fL    MCH 29.5 27.0 - 33.0 pg    MCHC 31.5 (L) 32.2 - 35.5 g/dL    RDW 44.9 35.9 - 50.0 fL    Platelet Count 255 164 - 446 K/uL    MPV 11.0 9.0 - 12.9 fL    Neutrophils-Polys 75.60 (H) 44.00 - 72.00 %    Lymphocytes 11.40 (L) 22.00 - 41.00 %    Monocytes 7.00 0.00 - 13.40 %    Eosinophils 4.30 0.00 - 6.90 %    Basophils 0.40 0.00 - 1.80 %    Immature Granulocytes 1.30 (H) 0.00 - 0.90 %    Nucleated RBC 0.00 0.00 - 0.20 /100 WBC    " Neutrophils (Absolute) 8.19 (H) 1.82 - 7.42 K/uL    Lymphs (Absolute) 1.24 1.00 - 4.80 K/uL    Monos (Absolute) 0.76 0.00 - 0.85 K/uL    Eos (Absolute) 0.47 0.00 - 0.51 K/uL    Baso (Absolute) 0.04 0.00 - 0.12 K/uL    Immature Granulocytes (abs) 0.14 (H) 0.00 - 0.11 K/uL    NRBC (Absolute) 0.00 K/uL   Basic Metabolic Panel    Collection Time: 06/13/24  5:17 AM   Result Value Ref Range    Sodium 139 135 - 145 mmol/L    Potassium 4.0 3.6 - 5.5 mmol/L    Chloride 105 96 - 112 mmol/L    Co2 21 20 - 33 mmol/L    Glucose 101 (H) 65 - 99 mg/dL    Bun 15 8 - 22 mg/dL    Creatinine 0.65 0.50 - 1.40 mg/dL    Calcium 9.1 8.5 - 10.5 mg/dL    Anion Gap 13.0 7.0 - 16.0   ESTIMATED GFR    Collection Time: 06/13/24  5:17 AM   Result Value Ref Range    GFR (CKD-EPI) 103 >60 mL/min/1.73 m 2       Medications:  Scheduled Medications   Medication Dose Frequency    lisinopril  80 mg Q DAY    hydrALAZINE  75 mg 4X/DAY    gabapentin  400 mg TID    benzonatate  100 mg TID    vitamin D3  5,000 Units DAILY    enoxaparin (LOVENOX) injection  40 mg DAILY AT 1800    Pharmacy Consult Request  1 Each PHARMACY TO DOSE    omeprazole  20 mg DAILY    amLODIPine  10 mg Q DAY     PRN medications: docusate sodium, senna-docusate **AND** polyethylene glycol/lytes, oxyCODONE immediate-release, Respiratory Therapy Consult, hydrALAZINE, bisacodyl, magnesium hydroxide, acetaminophen, carboxymethylcellulose, benzocaine-menthol, mag hydrox-al hydrox-simeth, ondansetron **OR** ondansetron, traZODone, sodium chloride, ipratropium-albuterol, melatonin    Diet:  Current Diet Order   Procedures    Diet Order Diet: Regular     [facility] 5/24/24 [facility] 6/5/24 [facility]      Study is compared to prior from 5/28/2024. Since the previous study the left basal ganglia/thalamic hemorrhage has decreased in size. Previously was 1.7 x 1.3 cm and currently 1.2 x 1.0 cm. Surrounding vasogenic edema is similar.       Medical Decision Making and Plan:  Left Basal Ganglia ICH  secondary to hypertensive emergency  Right Hemiparesis  PT and OT for mobility and ADLs. Per guidelines, 15 hours per week between PT, OT and/or SLP.  Follow-up Neurology (Dr. Babb)  Secondary stroke ppx: Anti-hypertensives     BP finally controlled and < 140 SBP    Headache  Neck Pain  6/3 VSS. No other symptoms   Resolved. Tylenol relieved her symptoms. No change in neuro status.    Continues to have headache which returned last night severely. Recently started on DVT ppx. STAT CTH to ensure no expansion of bleed. CTH showing subacute ICH. D/w rads directly 2024 3:05 PM - not concerned for it being acute, looks older, but will get [facility] for formal comparison. Hold Lovenox for now to error on side of caution, recent US BLE neg for DVT. Restarted on Lovenox without issue.    Oxycodone 2.5mg daily PRN headache  Resolved      Hypertension  Continue Hydralazine 50mg q6 hours -->  75mg every 6 hours  Continue Lisinopril 40mg daily -->  60mg daily --> 6/10 Increase to 80 mg daily   Continue Amlodipine 10mg daily    BP controlled      Leukocytosis  Improving on admission but unclear cause  Continue Rocephin x 2 doses (through ) - ok to d/c IV after   stable 10.8 - stays in 10's     Urinary Retention  Has otero - required CIC   Resolved. Off of flomax    Chronic Cough  Secondary to years of smoking. D/w patient and daughter, nothing has changed in terms of her cough, nothing new or concerning to them.   Tessalon perls scheduled TID    Hyperphosphatemia  6/3 Resolved, Ph normal     Anemia   Stable in 10's     Neuropathic Pain  6/3 Continue Gabapentin - increase to 400mg TID -->  600mg TID     Bowel    Having regular bowel movements     Upcoming Labs/imagin/18     DVT PROPHYLAXIS: None - Hemorrhagic stroke. Check US - negative. Per Neuro note, clear for DVT chemo-ppx once MRI completed (completed ). Lovenox 40 mg SQ started 6/3. Hold Lovenox 2024 until can formally  compare CTH but per d/w radiologist directly low c/f acute bleed as appears older. Reviewed personally and no acute finding on new CTH. 6/7 Restart Lovenox ppx.      HOSPITALIST FOLLOWING: No     CODE STATUS: FULL CODE     DISPO: Home with minimal support     VIANCA: 6/20/24 - Requesting extension as patient making really good progress     MEDS SENT TO: Not [facility]     DISCHARGE SPECIALIST FOLLOW UP: Neurology     Patient to scheduled follow up with their PCP within 2 weeks from discharge from the [facility].     Dr. Sofi Jules DO, MS  Department of Physical Medicine & Rehabilitation

## 2024-06-13 NOTE — THERAPY
Occupational Therapy  Daily Treatment     Patient Name: Latanya Ferrera  Age:  58 y.o., Sex:  female  Medical Record #: [id number]  Today's Date: 6/13/2024     Precautions  Precautions: (P) Fall Risk  Comments: R hemiplegia, R loss of sensation in hand, ICH         Subjective    Patient was resting in bed upon OT arrival and agreeable to OT.  She requested to try an upper body bicycle this session.       Objective       06/13/24 1231   OT Charge Group   OT Therapy Activity (Units) 1   OT Therapeutic Exercise (Units) 1   OT Total Time Spent   OT Individual Total Time Spent (Mins) 30   Precautions   Precautions Fall Risk   Functional Level of Assist   Bed, Chair, Wheelchair Transfer Standby Assist   Bed Chair Wheelchair Transfer Description Adaptive equipment;Set-up of equipment;Supervision for safety;Verbal cueing  (SBA bed to w/c without AD, w/c to bed with bedrail)   Sitting Upper Body Exercises   Sitting Upper Body Exercises Yes   Comments seated water bike level 0 x 2.5 mnutes   Standing Upper Body Exercises   Comments standing water bike level 0 x 6:20 (3:10 forward and 3:10 backward), cues to not compensate   Bed Mobility    Supine to Sit Supervised   Sit to Supine Supervised   Scooting Supervised   Interdisciplinary Plan of Care Collaboration   Patient Position at End of Therapy In Bed;Call Light within Reach;Tray Table within Reach;Phone within Reach;Bed Alarm On     Assist to don 1/2 shoes, able to doff 2/2 EOB.    Assessment    Patient required assist to don R shoe this session due to impaired sitting balance on EOB.  Required frequent verbal cues to decrease compensatory movements when using UB bike (A/P leans and trunk rotations).  Strengths: Able to follow instructions, Good insight into deficits/needs, Independent prior level of function, Manages pain appropriately, Motivated for self care and independence, Pleasant and cooperative, Supportive family, Willingly participates in therapeutic  activities  Barriers: Decreased endurance, Hemiparesis, Home accessibility, Impaired activity tolerance, Impaired balance (Impaired coordination/motor control)    Plan    ADLs, safety with transfers, neuro re-ed R UE, standing tolerance/balance,  3x/week with therapy techs for adjunct therapy     Occupational Therapy Goals (Active)       Problem: Bathing       Dates: Start:  05/30/24         Goal: STG-Within one week, patient will bathe with MIN A and LRAD       Dates: Start:  05/30/24         Goal Note filed on 06/10/24 0943 by Fox Castro, OT/L       Has refused all bathing during OT; therefore not assessed                 Problem: Dressing       Dates: Start:  06/10/24         Goal: STG-Within one week, patient will dress LB with setup and supervised with grab bar for balance       Dates: Start:  06/10/24               Problem: Functional Transfers       Dates: Start:  06/10/24         Goal: STG-Within one week, patient will transfer to toilet with supervision using grab bar for support       Dates: Start:  06/10/24               Problem: OT Long Term Goals       Dates: Start:  05/30/24         Goal: LTG-By discharge, patient will complete basic self care tasks with SPV-MOD I and LRAD       Dates: Start:  05/30/24            Goal: LTG-By discharge, patient will perform bathroom transfers with SPV-MOD I and LRAD       Dates: Start:  05/30/24               Problem: Toileting       Dates: Start:  06/10/24         Goal: STG-Within one week, patient will complete toileting tasks with supervision using grab bar for balance       Dates: Start:  06/10/24

## 2024-06-13 NOTE — THERAPY
"Occupational Therapy  Daily Treatment     Patient Name: Latanya Fererra  Age:  58 y.o., Sex:  female  Medical Record #: [id number]  Today's Date: 6/13/2024     Precautions  Precautions: (P) Fall Risk  Comments: R hemiplegia, R loss of sensation in hand, ICH         Subjective    Patient was lying in bed upon OT arrival and stated, \"I've been waiting for you.\"  She requested to shower and then go outside.     Objective       06/13/24 0931   OT Charge Group   OT Self Care / ADL (Units) 3   OT Therapy Activity (Units) 1   OT Total Time Spent   OT Individual Total Time Spent (Mins) 60   Precautions   Precautions Fall Risk   Functional Level of Assist   Bed, Chair, Wheelchair Transfer Standby Assist   Bed Chair Wheelchair Transfer Description Set-up of equipment;Supervision for safety  (stand step transfer)   Tub / Shower Transfers Standby Assist   Tub Shower Transfer Description Grab bar;Shower bench;Set-up of equipment;Supervision for safety   Fine Motor / Dexterity    Fine Motor / Dexterity Interventions R UE used for small pegboard activity.  She was able to place pegs with R hand, but needed L hand to place pegs into R hand prior to placing them.   Interdisciplinary Plan of Care Collaboration   Patient Position at End of Therapy In Bed;Bed Alarm On;Call Light within Reach;Tray Table within Reach;Phone within Reach   Shower/Bathe Self   Assistance Needed Set-up / clean-up;Supervision;Adaptive equipment;Verbal cues   Upper Body Dressing   Assistance Needed Set-up / clean-up   Lower Body Dressing   Assistance Needed Set-up / clean-up;Supervision   Putting On/Taking Off Footwear   Assistance Needed Set-up / clean-up;Supervision;Adaptive equipment   Comment with elastic shoelaces   Toileting Hygiene   Assistance Needed Set-up / clean-up;Supervision;Adaptive equipment   Toilet Transfer   Assistance Needed Set-up / clean-up;Supervision;Adaptive equipment         Assessment    Patient completed adl routine with setup and " SBA/supervision.  She continues to need cues for safety at times.  She stood up from w/c without locking one brake when pulling pants up to get dressed.    Strengths: Able to follow instructions, Good insight into deficits/needs, Independent prior level of function, Manages pain appropriately, Motivated for self care and independence, Pleasant and cooperative, Supportive family, Willingly participates in therapeutic activities  Barriers: Decreased endurance, Hemiparesis, Home accessibility, Impaired activity tolerance, Impaired balance (Impaired coordination/motor control)    Plan    ADLs, safety with transfers, neuro re-ed R UE, standing tolerance/balance,  3x/week with therapy techs for adjunct therapy     DME  OT DME Recommendations  Bathroom Equipment: Tub Transfer Bench (Medicaid Only)  Additional Equipment: Other (Comments) (TBD)        Occupational Therapy Goals (Active)       Problem: Bathing       Dates: Start:  05/30/24         Goal: STG-Within one week, patient will bathe with MIN A and LRAD       Dates: Start:  05/30/24         Goal Note filed on 06/10/24 0943 by Fox Castro, OT/L       Has refused all bathing during OT; therefore not assessed                 Problem: Dressing       Dates: Start:  06/10/24         Goal: STG-Within one week, patient will dress LB with setup and supervised with grab bar for balance       Dates: Start:  06/10/24               Problem: Functional Transfers       Dates: Start:  06/10/24         Goal: STG-Within one week, patient will transfer to toilet with supervision using grab bar for support       Dates: Start:  06/10/24               Problem: OT Long Term Goals       Dates: Start:  05/30/24         Goal: LTG-By discharge, patient will complete basic self care tasks with SPV-MOD I and LRAD       Dates: Start:  05/30/24            Goal: LTG-By discharge, patient will perform bathroom transfers with SPV-MOD I and LRAD       Dates: Start:  05/30/24                Problem: Toileting       Dates: Start:  06/10/24         Goal: STG-Within one week, patient will complete toileting tasks with supervision using grab bar for balance       Dates: Start:  06/10/24

## 2024-06-13 NOTE — DISCHARGE PLANNING
CM spoke with patients daughter Alcira to update on IDT and DC date of 6/20/24.  CM discussed the difference between home health and OP therapy.  Patient stated she would like OP therapy.  The orig plan was to start patient with home health as this CM explained to Alcira that a SW from home health could visit patient and help her navigate applying for unemployment, disability and other community resources patient might be eligible for since she won't be able to work for a while.      Alcira likes the idea of starting with home health and if patient is too independent for home health, they will refer patient to OP therapy.  CM gave Alcira the contact information for [employer] ([employer]) in the event home health refers patient to OP therapy so patient could get established with a  there.  CM also provided Alcira with their website.      CM will continue to monitor for DC needs.

## 2024-06-13 NOTE — PROGRESS NOTES
NURSING DAILY NOTE    Name: Latanya Ferrera   Date of Admission: 5/29/2024   Admitting Diagnosis: Hemorrhagic stroke (HCC)  Attending Physician: Sofi Jules D.o.  Allergies: Pineapple    Safety  Patient Assist  Min  Patient Precautions  Fall Risk  Precaution Comments  R hemiplegia, R loss of sensation in hand, ICH  Bed Transfer Status  Contact Guard Assist  Toilet Transfer Status   Contact Guard Assist  Assistive Devices  Rails, Wheelchair  Oxygen  None - Room Air  Diet/Therapeutic Dining  Current Diet Order   Procedures    Diet Order Diet: Regular     Pill Administration  whole  Agitated Behavioral Scale     ABS Level of Severity       Fall Risk  Has the patient had a fall this admission?   No  Carly Fisher Fall Risk Scoring  15, HIGH RISK  Fall Risk Safety Measures  bed alarm, chair alarm, poor balance, and low vision/ hearing    Vitals  Temperature: 36.4 °C (97.6 °F)  Temp src: Oral  Pulse: 83  Respiration: 18  Blood Pressure: 138/72  Blood Pressure MAP (Calculated): 94 MM HG  BP Location: Left, Upper Arm  Patient BP Position: Supine     Oxygen  Pulse Oximetry: 98 %  O2 (LPM): 0  O2 Delivery Device: None - Room Air    Bowel and Bladder  Last Bowel Movement  06/11/24  Stool Type  Type 5: Soft blob with clear cut edges (passed easily)  Bowel Device  Bathroom  Continent  Bladder: Did not void   Bowel: No movement  Bladder Function  Urine Void (mL):  (large)  Number of Times Voided: 1  Urinary Options: Yes  Urine Color: Yellow  Genitourinary Assessment   Bladder Assessment (WDL):  Within Defined Limits  Ramirez Catheter: Not Applicable  Ramirez Reasons per MD Order: Acute urinary retention or bladder outlet obstruction  Ramirez Care: Given with Soap and Water  Urinary Elimination: Catheter (Document on LDA)  Urine Color: Yellow  Bladder Device: Bathroom  Bladder Scan: Post Void  $ Bladder Scan Results (mL): 100  Bladder Medications: Yes    Skin  Xavier Score    17  Sensory Interventions   Bed Types: Standard/Trauma Mattress  Skin Preventative Measures: Waffle Overlay  Moisture Interventions  Moisturizers/Barriers: Barrier Wipes  Containment Devices: Indwelling Catheter      Pain  Pain Rating Scale  6 - Hard to ignore, avoid usual activities  Pain Location  Abdomen  Pain Location Orientation  Right  Pain Interventions   Medication (see MAR)    ADLs    Bathing   Patient Refused Bathing (Family showered the other day as per patient)  Linen Change   Partial  Personal Hygiene     Chlorhexidine Bath      Oral Care  Brushed Teeth  Teeth/Dentures  Missing Teeth (Comments)  Shave     Nutrition Percentage Eaten  Between % Consumed  Environmental Precautions  Treaded Slipper Socks on Patient, Personal Belongings, Wastebasket, Call Bell etc. in Easy Reach, Transferred to Stronger Side, Report Given to Other Health Care Providers Regarding Fall Risk, Bed in Low Position, Communication Sign for Patients & Families, Mobility Assessed & Appropriate Sign Placed  Patient Turns/Positioning  Patient Turns Self from Side to Side  Patient Turns Assistance/Tolerance  Assistance of One  Bed Positions  Bed Locked, Bed Controls On  Head of Bed Elevated  Self regulated      Psychosocial/Neurologic Assessment  Psychosocial Assessment  Psychosocial (WDL):  Within Defined Limits  Patient Behaviors: Fatigue  Family Behaviors: No Family Present  Neurologic Assessment  Neuro (WDL): Exceptions to WDL  Level of Consciousness: Alert  Orientation Level: Oriented X4  Cognition: Follows commands, Appropriate attention/concentration  Speech: Clear  Pupil Assesment: No  R Pupil Size (mm): 3  R Pupil Shape / Description: Round  R Pupil Reaction: Brisk  L Pupil Size (mm): 3  L Pupil Shape / Description: Round  L Pupil Reaction: Brisk  Motor Function/Sensation Assessment: Sensation, Motor strength  RUE Sensation: Numbness  Muscle Strength Right Arm: Fair Strength against Gravity but No Resistance  LUE  Sensation: Full sensation  Muscle Strength Left Arm: Normal Strength Against Gravity and Full Resistance  RLE Sensation: Numbness  Muscle Strength Right Leg: Fair Strength against Gravity but No Resistance  LLE Sensation: Full sensation  Muscle Strength Left Leg: Normal Strength Against Gravity and Full Resistance  EENT (WDL):  Within Defined Limits    Cardio/Pulmonary Assessment  Edema   RLE Edema: Trace  LLE Edema: Trace  Respiratory Breath Sounds  RUL Breath Sounds: Clear  RML Breath Sounds: Clear  RLL Breath Sounds: Clear  OZIEL Breath Sounds: Clear  LLL Breath Sounds: Clear  Cardiac Assessment   Cardiac (WDL):  WDL Except

## 2024-06-14 ENCOUNTER — APPOINTMENT (OUTPATIENT)
Dept: PHYSICAL THERAPY | Facility: REHABILITATION | Age: 59
DRG: 057 | End: 2024-06-14
Attending: PHYSICAL MEDICINE & REHABILITATION
Payer: MEDICAID

## 2024-06-14 ENCOUNTER — APPOINTMENT (OUTPATIENT)
Dept: OCCUPATIONAL THERAPY | Facility: REHABILITATION | Age: 59
DRG: 057 | End: 2024-06-14
Attending: PHYSICAL MEDICINE & REHABILITATION
Payer: MEDICAID

## 2024-06-14 PROCEDURE — 700111 HCHG RX REV CODE 636 W/ 250 OVERRIDE (IP): Mod: JZ | Performed by: PHYSICAL MEDICINE & REHABILITATION

## 2024-06-14 PROCEDURE — 97116 GAIT TRAINING THERAPY: CPT

## 2024-06-14 PROCEDURE — 770010 HCHG ROOM/CARE - REHAB SEMI PRIVAT*

## 2024-06-14 PROCEDURE — 97530 THERAPEUTIC ACTIVITIES: CPT

## 2024-06-14 PROCEDURE — 99232 SBSQ HOSP IP/OBS MODERATE 35: CPT | Performed by: PHYSICAL MEDICINE & REHABILITATION

## 2024-06-14 PROCEDURE — 97112 NEUROMUSCULAR REEDUCATION: CPT

## 2024-06-14 PROCEDURE — 97110 THERAPEUTIC EXERCISES: CPT

## 2024-06-14 PROCEDURE — A9270 NON-COVERED ITEM OR SERVICE: HCPCS | Performed by: PHYSICAL MEDICINE & REHABILITATION

## 2024-06-14 PROCEDURE — 700102 HCHG RX REV CODE 250 W/ 637 OVERRIDE(OP): Performed by: PHYSICAL MEDICINE & REHABILITATION

## 2024-06-14 RX ADMIN — GABAPENTIN 600 MG: 300 CAPSULE ORAL at 14:53

## 2024-06-14 RX ADMIN — GABAPENTIN 600 MG: 300 CAPSULE ORAL at 20:23

## 2024-06-14 RX ADMIN — HYDRALAZINE HYDROCHLORIDE 75 MG: 50 TABLET ORAL at 08:19

## 2024-06-14 RX ADMIN — HYDRALAZINE HYDROCHLORIDE 75 MG: 50 TABLET ORAL at 13:06

## 2024-06-14 RX ADMIN — AMLODIPINE BESYLATE 10 MG: 5 TABLET ORAL at 05:04

## 2024-06-14 RX ADMIN — BENZONATATE 100 MG: 100 CAPSULE ORAL at 20:23

## 2024-06-14 RX ADMIN — ENOXAPARIN SODIUM 40 MG: 100 INJECTION SUBCUTANEOUS at 17:17

## 2024-06-14 RX ADMIN — OXYCODONE 2.5 MG: 5 TABLET ORAL at 20:24

## 2024-06-14 RX ADMIN — BENZONATATE 100 MG: 100 CAPSULE ORAL at 08:19

## 2024-06-14 RX ADMIN — HYDRALAZINE HYDROCHLORIDE 75 MG: 50 TABLET ORAL at 17:17

## 2024-06-14 RX ADMIN — GABAPENTIN 600 MG: 300 CAPSULE ORAL at 08:19

## 2024-06-14 RX ADMIN — ACETAMINOPHEN 500 MG: 500 TABLET, FILM COATED ORAL at 05:06

## 2024-06-14 RX ADMIN — OMEPRAZOLE 20 MG: 20 CAPSULE, DELAYED RELEASE ORAL at 08:19

## 2024-06-14 RX ADMIN — ACETAMINOPHEN 500 MG: 500 TABLET, FILM COATED ORAL at 18:02

## 2024-06-14 RX ADMIN — HYDRALAZINE HYDROCHLORIDE 75 MG: 50 TABLET ORAL at 20:23

## 2024-06-14 RX ADMIN — LISINOPRIL 80 MG: 20 TABLET ORAL at 05:04

## 2024-06-14 RX ADMIN — Medication 5000 UNITS: at 08:18

## 2024-06-14 RX ADMIN — OXYCODONE 2.5 MG: 5 TABLET ORAL at 14:55

## 2024-06-14 RX ADMIN — BENZONATATE 100 MG: 100 CAPSULE ORAL at 14:53

## 2024-06-14 ASSESSMENT — GAIT ASSESSMENTS
DEVIATION: DECREASED TOE OFF;DECREASED BASE OF SUPPORT
ASSISTIVE DEVICE: FRONT WHEEL WALKER
GAIT LEVEL OF ASSIST: STANDBY ASSIST
DISTANCE (FEET): 250

## 2024-06-14 ASSESSMENT — ACTIVITIES OF DAILY LIVING (ADL)
BED_CHAIR_WHEELCHAIR_TRANSFER_DESCRIPTION: ADAPTIVE EQUIPMENT;SET-UP OF EQUIPMENT;SUPERVISION FOR SAFETY
BED_CHAIR_WHEELCHAIR_TRANSFER_DESCRIPTION: ADAPTIVE EQUIPMENT;SET-UP OF EQUIPMENT;SUPERVISION FOR SAFETY
BED_CHAIR_WHEELCHAIR_TRANSFER_DESCRIPTION: SQUAT PIVOT TRANSFER TO WHEELCHAIR
BED_CHAIR_WHEELCHAIR_TRANSFER_DESCRIPTION: SUPERVISION FOR SAFETY;VERBAL CUEING

## 2024-06-14 ASSESSMENT — PAIN DESCRIPTION - PAIN TYPE: TYPE: ACUTE PAIN

## 2024-06-14 NOTE — PROGRESS NOTES
NURSING DAILY NOTE    Name: Latanya Ferrera   Date of Admission: 5/29/2024   Admitting Diagnosis: Hemorrhagic stroke (HCC)  Attending Physician: Sofi Jules D.o.  Allergies: Pineapple    Safety  Patient Assist  Min  Patient Precautions  Fall Risk  Precaution Comments  R hemiplegia, R loss of sensation in hand, ICH  Bed Transfer Status  Standby Assist  Toilet Transfer Status   Contact Guard Assist  Assistive Devices  Rails, Wheelchair  Oxygen  None - Room Air  Diet/Therapeutic Dining  Current Diet Order   Procedures    Diet Order Diet: Regular     Pill Administration  whole  Agitated Behavioral Scale     ABS Level of Severity       Fall Risk  Has the patient had a fall this admission?   No  Carly Fisher Fall Risk Scoring  15, HIGH RISK  Fall Risk Safety Measures  bed alarm, chair alarm, and poor balance    Vitals  Temperature: 36.9 °C (98.5 °F)  Temp src: Oral  Pulse: 80  Respiration: 18  Blood Pressure: 120/67  Blood Pressure MAP (Calculated): 85 MM HG  BP Location: Left, Upper Arm  Patient BP Position: Supine     Oxygen  Pulse Oximetry: 98 %  O2 (LPM): 0  O2 Delivery Device: None - Room Air    Bowel and Bladder  Last Bowel Movement  06/11/24  Stool Type  Type 5: Soft blob with clear cut edges (passed easily)  Bowel Device  Bathroom  Continent  Bladder: Did not void   Bowel: No movement  Bladder Function  Urine Void (mL):  (large)  Number of Times Voided: 1  Urinary Options: Yes  Urine Color: Unable To Evaluate  Genitourinary Assessment   Bladder Assessment (WDL):  Within Defined Limits  Ramirez Catheter: Not Applicable  Ramirez Reasons per MD Order: Acute urinary retention or bladder outlet obstruction  Ramirez Care: Given with Soap and Water  Urinary Elimination: Catheter (Document on LDA)  Urine Color: Unable To Evaluate  Bladder Device: Bathroom  Bladder Scan: Post Void  $ Bladder Scan Results (mL): 100  Bladder Medications: Yes    Skin  Xavier Score    17  Sensory Interventions   Bed Types: Standard/Trauma Mattress  Skin Preventative Measures: Waffle Overlay  Moisture Interventions  Moisturizers/Barriers: Barrier Wipes  Containment Devices: Indwelling Catheter      Pain  Pain Rating Scale  3 - Sometimes distracts me  Pain Location  Arm, Leg  Pain Location Orientation  Right  Pain Interventions   Medication (see MAR)    ADLs    Bathing   Shower, * * With Assistance from, Staff  Linen Change   Partial  Personal Hygiene     Chlorhexidine Bath      Oral Care  Brushed Teeth  Teeth/Dentures  Missing Teeth (Comments)  Shave     Nutrition Percentage Eaten  Between 50-75% Consumed  Environmental Precautions  Treaded Slipper Socks on Patient  Patient Turns/Positioning  Patient Turns Self from Side to Side  Patient Turns Assistance/Tolerance  Assistance of One  Bed Positions  Bed Locked, Bed Controls On  Head of Bed Elevated  Self regulated      Psychosocial/Neurologic Assessment  Psychosocial Assessment  Psychosocial (WDL):  Within Defined Limits  Patient Behaviors: Fatigue  Family Behaviors: No Family Present  Neurologic Assessment  Neuro (WDL): Exceptions to WDL  Level of Consciousness: Alert  Orientation Level: Oriented X4  Cognition: Follows commands, Appropriate attention/concentration  Speech: Clear  Pupil Assesment: No  R Pupil Size (mm): 3  R Pupil Shape / Description: Round  R Pupil Reaction: Brisk  L Pupil Size (mm): 3  L Pupil Shape / Description: Round  L Pupil Reaction: Brisk  Motor Function/Sensation Assessment: Sensation, Motor strength  RUE Sensation: Numbness  Muscle Strength Right Arm: Fair Strength against Gravity but No Resistance  LUE Sensation: Full sensation  Muscle Strength Left Arm: Normal Strength Against Gravity and Full Resistance  RLE Sensation: Numbness  Muscle Strength Right Leg: Fair Strength against Gravity but No Resistance  LLE Sensation: Full sensation  Muscle Strength Left Leg: Normal Strength Against Gravity and Full Resistance  EENT  (WDL):  Within Defined Limits    Cardio/Pulmonary Assessment  Edema   RLE Edema: Trace  LLE Edema: Trace  Respiratory Breath Sounds  RUL Breath Sounds: Clear  RML Breath Sounds: Clear  RLL Breath Sounds: Clear  OZIEL Breath Sounds: Clear  LLL Breath Sounds: Clear  Cardiac Assessment   Cardiac (WDL):  WDL Except

## 2024-06-14 NOTE — PROGRESS NOTES
NURSING DAILY NOTE    Name: Latanya Ferrera   Date of Admission: 5/29/2024   Admitting Diagnosis: Hemorrhagic stroke (HCC)  Attending Physician: Sofi Jules D.o.  Allergies: Pineapple    Safety  Patient Assist  Mod A  Patient Precautions  Fall Risk  Precaution Comments  R hemiplegia, R loss of sensation in hand, ICH  Bed Transfer Status  Standby Assist  Toilet Transfer Status   Contact Guard Assist  Assistive Devices  Rails, Wheelchair  Oxygen  None - Room Air  Diet/Therapeutic Dining  Current Diet Order   Procedures    Diet Order Diet: Regular     Pill Administration  whole  Agitated Behavioral Scale     ABS Level of Severity       Fall Risk  Has the patient had a fall this admission?   No  Carly Fisher Fall Risk Scoring  15, HIGH RISK  Fall Risk Safety Measures  bed alarm and chair alarm    Vitals  Temperature: 36.7 °C (98.1 °F)  Temp src: Oral  Pulse: 78  Respiration: 18  Blood Pressure: 120/68  Blood Pressure MAP (Calculated): 85 MM HG  BP Location: Left, Upper Arm  Patient BP Position: Supine     Oxygen  Pulse Oximetry: 98 %  O2 (LPM): 0  O2 Delivery Device: None - Room Air    Bowel and Bladder  Last Bowel Movement  06/14/24  Stool Type  Type 5: Soft blob with clear cut edges (passed easily)  Bowel Device  Bathroom  Continent  Bladder: Did not void   Bowel: No movement  Bladder Function  Urine Void (mL):  (large)  Number of Times Voided: 1  Urinary Options: Yes  Urine Color: Unable To Evaluate  Genitourinary Assessment   Bladder Assessment (WDL):  Within Defined Limits  Ramirez Catheter: Not Applicable  Ramirez Reasons per MD Order: Acute urinary retention or bladder outlet obstruction  Ramirez Care: Given with Soap and Water  Urinary Elimination: Catheter (Document on LDA)  Urine Color: Unable To Evaluate  Bladder Device: Bathroom  Bladder Scan: Post Void  $ Bladder Scan Results (mL): 100  Bladder Medications: Yes    Skin  Xavier Score   17  Sensory  Interventions   Bed Types: Standard/Trauma Mattress  Skin Preventative Measures: Waffle Overlay  Moisture Interventions  Moisturizers/Barriers: Barrier Wipes  Containment Devices: Indwelling Catheter      Pain  Pain Rating Scale  5 - Interrupts some activities  Pain Location  Arm, Leg  Pain Location Orientation  Right  Pain Interventions   Medication (see MAR)    ADLs    Bathing   Shower, * * With Assistance from, Staff  Linen Change   Partial  Personal Hygiene     Chlorhexidine Bath      Oral Care  Brushed Teeth  Teeth/Dentures  Missing Teeth (Comments)  Shave     Nutrition Percentage Eaten  Snack, Between % Consumed  Environmental Precautions  Treaded Slipper Socks on Patient  Patient Turns/Positioning  Patient Turns Self from Side to Side  Patient Turns Assistance/Tolerance  Assistance of One  Bed Positions  Bed Locked, Bed Controls On  Head of Bed Elevated  Self regulated      Psychosocial/Neurologic Assessment  Psychosocial Assessment  Psychosocial (WDL):  Within Defined Limits  Patient Behaviors: Fatigue  Family Behaviors: No Family Present  Neurologic Assessment  Neuro (WDL): Exceptions to WDL  Level of Consciousness: Alert  Orientation Level: Oriented X4  Cognition: Follows commands, Appropriate attention/concentration  Speech: Clear  Pupil Assesment: No  R Pupil Size (mm): 3  R Pupil Shape / Description: Round  R Pupil Reaction: Brisk  L Pupil Size (mm): 3  L Pupil Shape / Description: Round  L Pupil Reaction: Brisk  Motor Function/Sensation Assessment: Sensation, Motor strength  RUE Sensation: Numbness  Muscle Strength Right Arm: Fair Strength against Gravity but No Resistance  LUE Sensation: Full sensation  Muscle Strength Left Arm: Normal Strength Against Gravity and Full Resistance  RLE Sensation: Numbness  Muscle Strength Right Leg: Fair Strength against Gravity but No Resistance  LLE Sensation: Full sensation  Muscle Strength Left Leg: Normal Strength Against Gravity and Full Resistance  EENT  (WDL):  Within Defined Limits    Cardio/Pulmonary Assessment  Edema   RLE Edema: Trace  LLE Edema: Trace  Respiratory Breath Sounds  RUL Breath Sounds: Clear  RML Breath Sounds: Clear  RLL Breath Sounds: Clear  OZIEL Breath Sounds: Clear  LLL Breath Sounds: Clear  Cardiac Assessment   Cardiac (WDL):  WDL Except

## 2024-06-14 NOTE — THERAPY
"Physical Therapy   Daily Treatment     Patient Name: Latanya Ferrera  Age:  58 y.o., Sex:  female  Medical Record #: [id number]  Today's Date: 6/14/2024     Precautions  Precautions: Fall Risk  Comments: R hemiplegia, R loss of sensation in hand, ICH    Subjective    Pt seated EOB at arrival, agreeable to PT tx.      Objective     06/14/24 1331   PT Charge Group   PT Gait Training (Units) 2   PT Neuromuscular Re-Education / Balance (Units) 2   PT Therapeutic Activities (Units) 1   PT Total Time Spent   PT Individual Total Time Spent (Mins) 75          06/14/24 1331   Gait Functional Level of Assist    Gait Level Of Assist Standby Assist  (R AFO (posterior assist))   Assistive Device Front Wheel Walker   Distance (Feet) 250   # of Times Distance was Traveled 1   Deviation Decreased Toe Off;Decreased Base Of Support  (instability in R terminal stance with R foot contacting LLE during initiation of swing)   Wheelchair Functional Level of Assist   Wheelchair Assist Supervised   Distance Wheelchair (Feet or Distance) 220   Wheelchair Description Extra time   Stairs Functional Level of Assist   Level of Assist with Stairs Minimal Assist   # of Stairs Climbed 1  (8\"H <> TM)   Stairs Description Extra time;Requires incidental assist;Verbal cueing   Transfer Functional Level of Assist   Bed, Chair, Wheelchair Transfer Standby Assist   Bed Chair Wheelchair Transfer Description Squat pivot transfer to wheelchair   Bed Mobility    Supine to Sit Modified Independent   Sit to Supine Modified Independent   Sit to Stand Standby Assist   Scooting Modified Independent   Rolling Modified Independent   Neuro-Muscular Treatments   Neuro-Muscular Treatments Anterior weight shift;Co-Contraction;Compensatory Strategies;Sequencing;Tactile Cuing;Verbal Cuing;Weight Shift Right;Weight Shift Left;Tapping;Electrical Stimulation   Comments see note   Interdisciplinary Plan of Care Collaboration   Patient Position at End of Therapy In Bed;Call Light " "within Reach;Tray Table within Reach;Phone within Reach;Bed Alarm On   PT DME Recommendations   Wheelchair 18\" Width;Jaciel Height (16\"-17\");Lightweight;Removable/Flip Back Armrests;Standard Leg Rests;Anti-tippers   Cushion Standard   Assistive Device Front Wheeled Walker   Additional Equipment Other (Comments)  (R AFO)   Physical Therapist Assigned   Assigned PT / Treatment Time / Comments Christina. 60-90   TA:  Manual w/c mobility on level surfaces, c/ LUE and BLEx 150', then LUE and RLE for 100' including turns, doorway navigation, and setup for transfers.  NMRE:  NMES to R hams and quads to facilitate cocontraction in stance  NMES Parameters:   Pad Size: 3\"x5\"  Location: R quads, R hams  Frequency (Hz): 30  Duration (us): 360  On/off: hand switch  Intensity: R quads: 55, R hams: 75  Ramp: 2 sec  Total time: initial setup 10 min, then during gait training x 14'00\"   Gait training: high intensity gait c/ TM and concurrent NMES facilitation to RLE in stance to promote weight acceptance and knee control during stance phase; tactile and verbal cues for step length, heel first contact, and rita; harness for safety, BUE on rails; metronome and music for rita cues (rhythmic auditory stimulation @ 80 bpm))  4 x 3-4 min rounds at 1.0-1.2 mph; incline increased to 3% on rounds 2-4; posterior assist AFO on RLE trialed for last round; total distance: 1179'  1 x 1'00\" retro @ 0.8 mph, pt c/ 1 LOB during effort requiring assist by PT to regain positioning and 1 emergency stop, declined another attempt in retro  Seated rests between above efforts   OG c/ R AFO and FWW c/  assist, VC for continuous stepping pattern, 1 x 250' c/ 1 standing rest during effort.   Education: rationale for interventions, progress to date     Assessment    Latanya c/ fair tolerance to NMES and needs encouragement to participate in therapeutic levels of stimulation but able to tolerate multiple rounds of FES with gait training. Improved weight " "acceptance, step length, and rita in OG walking post TM intervention today.     Strengths: Able to follow instructions, Alert and oriented, Independent prior level of function, Motivated for self care and independence, Pleasant and cooperative, Supportive family, Willingly participates in therapeutic activities  Barriers: Decreased endurance, Difficulty following instructions, Fatigue, Hemiplegia, Home accessibility, Hypertension, Impaired balance, Impaired carryover of learning, Limited mobility (lives alone, decreased knowledge of condition, limited daytime family support)    Plan    Floor recovery  Continue gait and pregait without UE support vs FWW vs cane and FES as tolerated   AFO trial: Xtern vs posterior DF assist   TM vs OG gait training c/ metronome for rhythm/rita  Pregait to gait with dec UE support- stepping and weight acceptance/stance stability  Push/pulls c/ resistance   Ankle extrinsic strength and coordination, hams and hip ext strength (pulling, hip hinge, deadlift patterns)  Functional endurance and OOB time   Repeat FT c/ daughter prior to d/c     DME  PT DME Recommendations  Wheelchair: (P) 18\" Width, Jaciel Height (16\"-17\"), Lightweight, Removable/Flip Back Armrests, Standard Leg Rests, Anti-tippers  Cushion: (P) Standard  Assistive Device: (P) Front Wheeled Walker  Additional Equipment: (P) Other (Comments) (R AFO)    Passport items to be completed:  Get in/out of bed safely, in/out of a vehicle, safely use mobility device, walk or wheel around home/community, navigate up and down stairs, show how to get up/down from the ground, ensure home is accessible, demonstrate HEP, complete caregiver training    Physical Therapy Problems (Active)       Problem: PT-Long Term Goals       Dates: Start:  05/30/24         Goal: LTG-By discharge, patient will ambulate >/= 300' c/ LRAD and CGA or better.        Dates: Start:  05/30/24            Goal: LTG-By discharge, patient will transfer one surface to " another c/ Boni.        Dates: Start:  05/30/24            Goal: LTG-By discharge, patient will ambulate up/down 2 stairs c/ CGA or better to access home.        Dates: Start:  05/30/24            Goal: LTG-By discharge, patient will transfer in/out of a car c/ CGA or better.        Dates: Start:  05/30/24

## 2024-06-14 NOTE — THERAPY
"Occupational Therapy  Daily Treatment     Patient Name: Latanya Ferrera  Age:  58 y.o., Sex:  female  Medical Record #: [id number]  Today's Date: 6/14/2024     Precautions  Precautions: (P) Fall Risk  Comments: R hemiplegia, R loss of sensation in hand, ICH         Subjective    Patient was seated at EOB upon OT arrival and stated, \"I was waiting for you!\".   When asked to  her cup of tea with her R hand and take a drink from it, she said after completing it, \"I didn't know I could do that!\"     Objective       06/14/24 0701   OT Charge Group   OT Therapy Activity (Units) 4   OT Total Time Spent   OT Individual Total Time Spent (Mins) 60   Precautions   Precautions Fall Risk   Functional Level of Assist   Bed, Chair, Wheelchair Transfer Supervised   Bed Chair Wheelchair Transfer Description Adaptive equipment;Set-up of equipment;Supervision for safety  (SPT w/c to bed with rail)   Fine Motor / Dexterity    Fine Motor / Dexterity Interventions B UE task of removing tops of various pill vials and then putting tops back on.  R UE gross and FMC task of picking up 48 large pegs from bucket with R or L hand, placing into large, slanted pegboard with R UE and then removing with R UE and placing in a bucket (while copying a design).   Bed Mobility    Sit to Supine Modified Independent   Interdisciplinary Plan of Care Collaboration   Patient Position at End of Therapy In Bed;Call Light within Reach;Tray Table within Reach;Phone within Reach;Bed Alarm On   Putting On/Taking Off Footwear   Assistance Needed Adaptive equipment   Comment to doff shoes with elastic laces     Patient took three drinks of tea from her cup using her R UE. Therapist recommended patient continue to use R UE to feed and drink from now on, unless cup is too full or contents are too hot.    Assessment    Patient's R UE function continues to improve and patient continues to be highly motivated.  She was surprised and happy when she was able to drink " from a cup using her right hand.  Strengths: Able to follow instructions, Good insight into deficits/needs, Independent prior level of function, Manages pain appropriately, Motivated for self care and independence, Pleasant and cooperative, Supportive family, Willingly participates in therapeutic activities  Barriers: Decreased endurance, Hemiparesis, Home accessibility, Impaired activity tolerance, Impaired balance (Impaired coordination/motor control)    Plan    ADLs, safety with transfers, neuro re-ed R UE, standing tolerance/balance,  3x/week with therapy techs for adjunct therapy     DME  OT DME Recommendations  Bathroom Equipment: Tub Transfer Bench (Medicaid Only)  Additional Equipment: Other (Comments) (TBD)      Occupational Therapy Goals (Active)       Problem: Bathing       Dates: Start:  05/30/24         Goal: STG-Within one week, patient will bathe with MIN A and LRAD       Dates: Start:  05/30/24         Goal Note filed on 06/10/24 0943 by Fox Castro, OT/L       Has refused all bathing during OT; therefore not assessed                 Problem: Dressing       Dates: Start:  06/10/24         Goal: STG-Within one week, patient will dress LB with setup and supervised with grab bar for balance       Dates: Start:  06/10/24               Problem: Functional Transfers       Dates: Start:  06/10/24         Goal: STG-Within one week, patient will transfer to toilet with supervision using grab bar for support       Dates: Start:  06/10/24               Problem: OT Long Term Goals       Dates: Start:  05/30/24         Goal: LTG-By discharge, patient will complete basic self care tasks with SPV-MOD I and LRAD       Dates: Start:  05/30/24            Goal: LTG-By discharge, patient will perform bathroom transfers with SPV-MOD I and LRAD       Dates: Start:  05/30/24               Problem: Toileting       Dates: Start:  06/10/24         Goal: STG-Within one week, patient will complete toileting tasks  with supervision using grab bar for balance       Dates: Start:  06/10/24

## 2024-06-14 NOTE — PROGRESS NOTES
NURSING DAILY NOTE    Name: Latanya Ferrera   Date of Admission: 5/29/2024   Admitting Diagnosis: Hemorrhagic stroke (HCC)  Attending Physician: Sofi Jules D.o.  Allergies: Pineapple    Safety  Patient Assist  Min  Patient Precautions  Fall Risk  Precaution Comments  R hemiplegia, R loss of sensation in hand, ICH  Bed Transfer Status  Standby Assist  Toilet Transfer Status   Contact Guard Assist  Assistive Devices  Rails, Wheelchair  Oxygen  None - Room Air  Diet/Therapeutic Dining  Current Diet Order   Procedures    Diet Order Diet: Regular     Pill Administration  whole  Agitated Behavioral Scale     ABS Level of Severity       Fall Risk  Has the patient had a fall this admission?   No  Carly Fisher Fall Risk Scoring  15, HIGH RISK  Fall Risk Safety Measures  bed alarm and chair alarm    Vitals  Temperature: 36.5 °C (97.7 °F)  Temp src: Oral  Pulse: 89  Respiration: 18  Blood Pressure: 132/74  Blood Pressure MAP (Calculated): 93 MM HG  BP Location: Left, Upper Arm  Patient BP Position: Supine     Oxygen  Pulse Oximetry: 97 %  O2 (LPM): 0  O2 Delivery Device: None - Room Air    Bowel and Bladder  Last Bowel Movement  06/11/24  Stool Type  Type 5: Soft blob with clear cut edges (passed easily)  Bowel Device  Bathroom  Continent  Bladder: Did not void   Bowel: No movement  Bladder Function  Urine Void (mL):  (large)  Number of Times Voided: 1  Urinary Options: Yes  Urine Color: Unable To Evaluate  Genitourinary Assessment   Bladder Assessment (WDL):  Within Defined Limits  Ramirez Catheter: Not Applicable  Ramirez Reasons per MD Order: Acute urinary retention or bladder outlet obstruction  Ramirez Care: Given with Soap and Water  Urinary Elimination: Catheter (Document on LDA)  Urine Color: Unable To Evaluate  Bladder Device: Bathroom  Bladder Scan: Post Void  $ Bladder Scan Results (mL): 100  Bladder Medications: Yes    Skin  Xavier Score   17  Sensory  Interventions   Bed Types: Standard/Trauma Mattress  Skin Preventative Measures: Waffle Overlay  Moisture Interventions  Moisturizers/Barriers: Barrier Wipes  Containment Devices: Indwelling Catheter      Pain  Pain Rating Scale  5 - Interrupts some activities  Pain Location  Leg, Arm  Pain Location Orientation  Right  Pain Interventions   Medication (see MAR)    ADLs    Bathing   Shower, * * With Assistance from, Staff  Linen Change   Partial  Personal Hygiene     Chlorhexidine Bath      Oral Care  Brushed Teeth  Teeth/Dentures  Missing Teeth (Comments)  Shave     Nutrition Percentage Eaten  Between 50-75% Consumed  Environmental Precautions  Treaded Slipper Socks on Patient  Patient Turns/Positioning  Patient Turns Self from Side to Side  Patient Turns Assistance/Tolerance  Assistance of One  Bed Positions  Bed Locked, Bed Controls On  Head of Bed Elevated  Self regulated      Psychosocial/Neurologic Assessment  Psychosocial Assessment  Psychosocial (WDL):  Within Defined Limits  Patient Behaviors: Fatigue  Family Behaviors: No Family Present  Neurologic Assessment  Neuro (WDL): Exceptions to WDL  Level of Consciousness: Alert  Orientation Level: Oriented X4  Cognition: Follows commands, Appropriate attention/concentration  Speech: Clear  Pupil Assesment: No  R Pupil Size (mm): 3  R Pupil Shape / Description: Round  R Pupil Reaction: Brisk  L Pupil Size (mm): 3  L Pupil Shape / Description: Round  L Pupil Reaction: Brisk  Motor Function/Sensation Assessment: Sensation, Motor strength  RUE Sensation: Numbness  Muscle Strength Right Arm: Fair Strength against Gravity but No Resistance  LUE Sensation: Full sensation  Muscle Strength Left Arm: Normal Strength Against Gravity and Full Resistance  RLE Sensation: Numbness  Muscle Strength Right Leg: Fair Strength against Gravity but No Resistance  LLE Sensation: Full sensation  Muscle Strength Left Leg: Normal Strength Against Gravity and Full Resistance  EENT (WDL):   Within Defined Limits    Cardio/Pulmonary Assessment  Edema   RLE Edema: Trace  LLE Edema: Trace  Respiratory Breath Sounds  RUL Breath Sounds: Clear  RML Breath Sounds: Clear  RLL Breath Sounds: Clear  OZIEL Breath Sounds: Clear  LLL Breath Sounds: Clear  Cardiac Assessment   Cardiac (WDL):  WDL Except

## 2024-06-14 NOTE — THERAPY
Occupational Therapy  Daily Treatment     Patient Name: Latanya Ferrera  Age:  58 y.o., Sex:  female  Medical Record #: [id number]  Today's Date: 6/14/2024     Precautions  Precautions: (P) Fall Risk  Comments: R hemiplegia, R loss of sensation in hand, ICH         Subjective    Patient was received on nustep after finishing PT.  She was agreeable to a simulated grocery shopping task.     Objective       06/14/24 1101   OT Charge Group   OT Therapy Activity (Units) 2   OT Total Time Spent   OT Individual Total Time Spent (Mins) 30   Precautions   Precautions Fall Risk   Cognition    Safety Awareness Impaired   Attention Impaired   Functional Level of Assist   Bed, Chair, Wheelchair Transfer Standby Assist   Bed Chair Wheelchair Transfer Description Adaptive equipment;Set-up of equipment;Supervision for safety  (bedrail from w/c to bed)   IADL Treatments   IADL Treatments Other   Comments Patient participated in simulated grocery shopping task pushing grocery cart around gym while retreiving ten items with CGA/min A for balance and cues for safety/taking rest breaks.   Interdisciplinary Plan of Care Collaboration   Patient Position at End of Therapy In Bed;Call Light within Reach;Tray Table within Reach;Phone within Reach         Assessment    Patient required CGA/min A for mobility/balance during simulated grocery shopping task due to R LE fatigue with foot drag and ataxia.  She demonstrated impaired safety awareness due to wanting to push through it, but required verbal cues to take seated or standing rest breaks for safety.    Strengths: Able to follow instructions, Good insight into deficits/needs, Independent prior level of function, Manages pain appropriately, Motivated for self care and independence, Pleasant and cooperative, Supportive family, Willingly participates in therapeutic activities  Barriers: Decreased endurance, Hemiparesis, Home accessibility, Impaired activity tolerance, Impaired balance (Impaired  coordination/motor control)    Plan    ADLs, safety with transfers, neuro re-ed R UE, standing tolerance/balance,  3x/week with therapy techs for adjunct therapy     Occupational Therapy Goals (Active)       Problem: Bathing       Dates: Start:  05/30/24         Goal: STG-Within one week, patient will bathe with MIN A and LRAD       Dates: Start:  05/30/24         Goal Note filed on 06/10/24 0943 by Fox Castro, OT/L       Has refused all bathing during OT; therefore not assessed                 Problem: Dressing       Dates: Start:  06/10/24         Goal: STG-Within one week, patient will dress LB with setup and supervised with grab bar for balance       Dates: Start:  06/10/24               Problem: Functional Transfers       Dates: Start:  06/10/24         Goal: STG-Within one week, patient will transfer to toilet with supervision using grab bar for support       Dates: Start:  06/10/24               Problem: OT Long Term Goals       Dates: Start:  05/30/24         Goal: LTG-By discharge, patient will complete basic self care tasks with SPV-MOD I and LRAD       Dates: Start:  05/30/24            Goal: LTG-By discharge, patient will perform bathroom transfers with SPV-MOD I and LRAD       Dates: Start:  05/30/24               Problem: Toileting       Dates: Start:  06/10/24         Goal: STG-Within one week, patient will complete toileting tasks with supervision using grab bar for balance       Dates: Start:  06/10/24

## 2024-06-14 NOTE — THERAPY
"Physical Therapy   Daily Treatment     Patient Name: Latanya Ferrera  Age:  58 y.o., Sex:  female  Medical Record #: [id number]  Today's Date: 6/13/2024     Precautions  Precautions: Fall Risk  Comments: R hemiplegia, R loss of sensation in hand, ICH    Subjective    \"I want to go outside.\" Pt in w/c at arrival, agreeable to PT tx.      Objective       06/13/24 1331   PT Charge Group   PT Gait Training (Units) 1   PT Neuromuscular Re-Education / Balance (Units) 1   PT Total Time Spent   PT Individual Total Time Spent (Mins) 30   Gait Functional Level of Assist    Gait Level Of Assist Contact Guard Assist   Assistive Device Front Wheel Walker  (hand  assist RUE c/ strap and coban)   Distance (Feet) 160   # of Times Distance was Traveled 1  (1 x 70')   Deviation Decreased Toe Off;Decreased Heel Strike;Decreased Base Of Support  (dysrhythmic rita, early heel rise on RLE)   Transfer Functional Level of Assist   Bed, Chair, Wheelchair Transfer Standby Assist   Bed Chair Wheelchair Transfer Description Adaptive equipment;Squat pivot transfer to wheelchair  (SPT vs stand step vs squat pivot)   Supine Lower Body Exercise   Bridges Two Legged;3 sets of 10  (straight leg bridges on ball as above, then SL bridges c/ unilateral ball taps 3 x 5 each, then alt 3 x 6)   Knee to Chest 2 sets of 10  (hams curls c/ ball, small ball to cue hip adduction cocontraction c/ effort)   Other Exercises hams setting on therapy peanut, small therapy ball to cue hip adduction co-contraction + multimodal cues 1 x 10 @ 3\" holds   Bed Mobility    Sit to Supine Standby Assist   Sit to Stand Standby Assist   Scooting Supervised   Rolling Supervised   Neuro-Muscular Treatments   Neuro-Muscular Treatments Anterior weight shift;Co-Contraction;Compensatory Strategies;Sequencing;Tactile Cuing;Verbal Cuing;Weight Shift Right;Weight Shift Left   Comments c/ above TE/NMRE in supine c/ therapy peanut, and c/ gait training, see note   Interdisciplinary Plan " of Care Collaboration   IDT Collaboration with  Physical Therapist   Patient Position at End of Therapy In Bed;Call Light within Reach;Tray Table within Reach;Phone within Reach   Collaboration Comments AFO trial   Physical Therapist Assigned   Assigned PT / Treatment Time / Comments Christina. 60-90     Gait training c/ FWW on outdoor surfaces and 3% ramps, distances as above, cues for slowing down on turns to prevent LE crossing midline, rita and timing of step pattern, standing rest breaks and pausing to establish trunk control with fatigue.    Supine TE/NMRE for trunk control, coordination, and posterior chain facilitation on RLE.     Assessment    Latanya improving in FWW management c/ cues and increased time. Better control of tibial advancement when using crouch gait strategy but RLE exhibits loss of push off and poor foot clearance when fatigued.   Recommend continuing LE strength in functional patterns (hip hinge, pulls) and pushing and slope training c/ TM to improve tibial advancement and stance stability.     Strengths: Able to follow instructions, Alert and oriented, Independent prior level of function, Motivated for self care and independence, Pleasant and cooperative, Supportive family, Willingly participates in therapeutic activities  Barriers: Decreased endurance, Difficulty following instructions, Fatigue, Hemiplegia, Home accessibility, Hypertension, Impaired balance, Impaired carryover of learning, Limited mobility (lives alone, decreased knowledge of condition, limited daytime family support)    Plan    Floor recovery  Continue gait and pregait without UE support vs FWW vs cane   AFO trial: Xtern vs posterior DF assist   TM vs OG gait training c/ metronome for rhythm/rita  Pregait to gait with dec UE support- stepping and weight acceptance/stance stability  Push/pulls c/ resistance   Ankle extrinsic strength and coordination, hams and hip ext strength (pulling, hip hinge, deadlift  "patterns)  Functional endurance and OOB time   Repeat FT c/ daughter prior to d/c        DME  PT DME Recommendations  Wheelchair: 18\" Width, Jaciel Height (16\"-17\"), Lightweight, Removable/Flip Back Armrests, Standard Leg Rests, Anti-tippers  Cushion: Standard  Assistive Device: Front Wheeled Walker  Additional Equipment: Other (Comments) (TBD)    Passport items to be completed:  Get in/out of bed safely, in/out of a vehicle, safely use mobility device, walk or wheel around home/community, navigate up and down stairs, show how to get up/down from the ground, ensure home is accessible, demonstrate HEP, complete caregiver training    Physical Therapy Problems (Active)       Problem: PT-Long Term Goals       Dates: Start:  05/30/24         Goal: LTG-By discharge, patient will ambulate >/= 300' c/ LRAD and CGA or better.        Dates: Start:  05/30/24            Goal: LTG-By discharge, patient will transfer one surface to another c/ Boni.        Dates: Start:  05/30/24            Goal: LTG-By discharge, patient will ambulate up/down 2 stairs c/ CGA or better to access home.        Dates: Start:  05/30/24            Goal: LTG-By discharge, patient will transfer in/out of a car c/ CGA or better.        Dates: Start:  05/30/24              "

## 2024-06-14 NOTE — PROGRESS NOTES
"  Physical Medicine & Rehabilitation Progress Note    Encounter Date: 6/14/2024    Chief Complaint: Walked outside     Interval Events (Subjective):  LAURA ALVAREZ 6/13  Voiding     Seen and examined in her room. She is tearful. She was able to walk outside yesterday with the walker.       ROS: 14 point ROS negative unless otherwise specified in the HPI    Objective:  VITAL SIGNS: /67   Pulse 80   Temp 36.9 °C (98.5 °F) (Oral)   Resp 18   Ht 1.676 m (5' 6\")   Wt 81.6 kg (180 lb)   SpO2 98%   BMI 29.05 kg/m²     GEN: No apparent distress  HEENT: Head normocephalic, atraumatic.  Sclera nonicteric bilaterally, no ocular discharge appreciated bilaterally.  CV: Extremities warm and well-perfused, no peripheral edema appreciated bilaterally.  PULMONARY: Breathing nonlabored on room air, no respiratory accessory muscle use.  Not requiring supplemental oxygen.  SKIN: No appreciable skin breakdown on exposed areas of skin.  PSYCH: Mood and affect within normal limits.  NEURO: Awake alert.  Conversational.  Logical thought content. Right hemiparesis improving. Numbness improving.     Laboratory Values:  Recent Results (from the past 72 hour(s))   CBC WITH DIFFERENTIAL    Collection Time: 06/13/24  5:17 AM   Result Value Ref Range    WBC 10.8 4.8 - 10.8 K/uL    RBC 3.56 (L) 4.20 - 5.40 M/uL    Hemoglobin 10.5 (L) 12.0 - 16.0 g/dL    Hematocrit 33.3 (L) 37.0 - 47.0 %    MCV 93.5 81.4 - 97.8 fL    MCH 29.5 27.0 - 33.0 pg    MCHC 31.5 (L) 32.2 - 35.5 g/dL    RDW 44.9 35.9 - 50.0 fL    Platelet Count 255 164 - 446 K/uL    MPV 11.0 9.0 - 12.9 fL    Neutrophils-Polys 75.60 (H) 44.00 - 72.00 %    Lymphocytes 11.40 (L) 22.00 - 41.00 %    Monocytes 7.00 0.00 - 13.40 %    Eosinophils 4.30 0.00 - 6.90 %    Basophils 0.40 0.00 - 1.80 %    Immature Granulocytes 1.30 (H) 0.00 - 0.90 %    Nucleated RBC 0.00 0.00 - 0.20 /100 WBC    Neutrophils (Absolute) 8.19 (H) 1.82 - 7.42 K/uL    Lymphs (Absolute) 1.24 1.00 - 4.80 K/uL    Monos " (Absolute) 0.76 0.00 - 0.85 K/uL    Eos (Absolute) 0.47 0.00 - 0.51 K/uL    Baso (Absolute) 0.04 0.00 - 0.12 K/uL    Immature Granulocytes (abs) 0.14 (H) 0.00 - 0.11 K/uL    NRBC (Absolute) 0.00 K/uL   Basic Metabolic Panel    Collection Time: 06/13/24  5:17 AM   Result Value Ref Range    Sodium 139 135 - 145 mmol/L    Potassium 4.0 3.6 - 5.5 mmol/L    Chloride 105 96 - 112 mmol/L    Co2 21 20 - 33 mmol/L    Glucose 101 (H) 65 - 99 mg/dL    Bun 15 8 - 22 mg/dL    Creatinine 0.65 0.50 - 1.40 mg/dL    Calcium 9.1 8.5 - 10.5 mg/dL    Anion Gap 13.0 7.0 - 16.0   ESTIMATED GFR    Collection Time: 06/13/24  5:17 AM   Result Value Ref Range    GFR (CKD-EPI) 103 >60 mL/min/1.73 m 2       Medications:  Scheduled Medications   Medication Dose Frequency    gabapentin  600 mg TID    lisinopril  80 mg Q DAY    hydrALAZINE  75 mg 4X/DAY    benzonatate  100 mg TID    vitamin D3  5,000 Units DAILY    enoxaparin (LOVENOX) injection  40 mg DAILY AT 1800    Pharmacy Consult Request  1 Each PHARMACY TO DOSE    omeprazole  20 mg DAILY    amLODIPine  10 mg Q DAY     PRN medications: docusate sodium, senna-docusate **AND** polyethylene glycol/lytes, oxyCODONE immediate-release, Respiratory Therapy Consult, hydrALAZINE, bisacodyl, magnesium hydroxide, acetaminophen, carboxymethylcellulose, benzocaine-menthol, mag hydrox-al hydrox-simeth, ondansetron **OR** ondansetron, traZODone, sodium chloride, ipratropium-albuterol, melatonin    Diet:  Current Diet Order   Procedures    Diet Order Diet: Regular     [facility] 5/24/24 [facility] 6/5/24 [facility]      Study is compared to prior from 5/28/2024. Since the previous study the left basal ganglia/thalamic hemorrhage has decreased in size. Previously was 1.7 x 1.3 cm and currently 1.2 x 1.0 cm. Surrounding vasogenic edema is similar.       Medical Decision Making and Plan:  Left Basal Ganglia ICH secondary to hypertensive emergency  Right Hemiparesis  PT and OT for mobility and ADLs. Per guidelines, 15  hours per week between PT, OT and/or SLP.  Follow-up Neurology (Dr. Babb)  Secondary stroke ppx: Anti-hypertensives     BP finally controlled and < 140 SBP    Headache  Neck Pain  6/3 VSS. No other symptoms   Resolved. Tylenol relieved her symptoms. No change in neuro status.    Continues to have headache which returned last night severely. Recently started on DVT ppx. STAT CTH to ensure no expansion of bleed. CTH showing subacute ICH. D/w rads directly 2024 3:05 PM - not concerned for it being acute, looks older, but will get [facility] for formal comparison. Hold Lovenox for now to error on side of caution, recent US BLE neg for DVT. Restarted on Lovenox without issue.    Oxycodone 2.5mg daily PRN headache  Resolved      Hypertension  Continue Hydralazine 50mg q6 hours -->  75mg every 6 hours  Continue Lisinopril 40mg daily -->  60mg daily --> 6/10 Increase to 80 mg daily   Continue Amlodipine 10mg daily    BP controlled      Leukocytosis  Improving on admission but unclear cause  Continue Rocephin x 2 doses (through ) - ok to d/c IV after   stable 10.8 - stays in 10's     Urinary Retention  Has otero - required CIC   Resolved. Off of flomax    Chronic Cough  Secondary to years of smoking. D/w patient and daughter, nothing has changed in terms of her cough, nothing new or concerning to them.   Tessalon perls scheduled TID    Hyperphosphatemia  6/3 Resolved, Ph normal     Anemia   Stable in 10's     Neuropathic Pain  6/3 Continue Gabapentin - increase to 400mg TID -->  600mg TID     Bowel    Having regular bowel movements     Upcoming Labs/imagin/18     DVT PROPHYLAXIS: None - Hemorrhagic stroke. Check US - negative. Per Neuro note, clear for DVT chemo-ppx once MRI completed (completed ). Lovenox 40 mg SQ started 6/3. Hold Lovenox 2024 until can formally compare CTH but per d/w radiologist directly low c/f acute bleed as appears older. Reviewed personally and no  acute finding on new CTH. 6/7 Restart Lovenox ppx.      HOSPITALIST FOLLOWING: No     CODE STATUS: FULL CODE     DISPO: Home with minimal support     VIANCA: 6/20/24 - Requesting extension as patient making really good progress     MEDS SENT TO: Not [facility]     DISCHARGE SPECIALIST FOLLOW UP: Neurology     Patient to scheduled follow up with their PCP within 2 weeks from discharge from the [facility].     Dr. Sofi Jules DO, MS  Department of Physical Medicine & Rehabilitation

## 2024-06-15 PROCEDURE — 700111 HCHG RX REV CODE 636 W/ 250 OVERRIDE (IP): Mod: JZ | Performed by: PHYSICAL MEDICINE & REHABILITATION

## 2024-06-15 PROCEDURE — 770010 HCHG ROOM/CARE - REHAB SEMI PRIVAT*

## 2024-06-15 PROCEDURE — 700102 HCHG RX REV CODE 250 W/ 637 OVERRIDE(OP): Performed by: PHYSICAL MEDICINE & REHABILITATION

## 2024-06-15 PROCEDURE — A9270 NON-COVERED ITEM OR SERVICE: HCPCS | Performed by: PHYSICAL MEDICINE & REHABILITATION

## 2024-06-15 RX ADMIN — HYDRALAZINE HYDROCHLORIDE 75 MG: 50 TABLET ORAL at 13:06

## 2024-06-15 RX ADMIN — GABAPENTIN 600 MG: 300 CAPSULE ORAL at 21:42

## 2024-06-15 RX ADMIN — BENZONATATE 100 MG: 100 CAPSULE ORAL at 08:15

## 2024-06-15 RX ADMIN — GABAPENTIN 600 MG: 300 CAPSULE ORAL at 08:15

## 2024-06-15 RX ADMIN — OMEPRAZOLE 20 MG: 20 CAPSULE, DELAYED RELEASE ORAL at 08:16

## 2024-06-15 RX ADMIN — BENZONATATE 100 MG: 100 CAPSULE ORAL at 21:42

## 2024-06-15 RX ADMIN — LISINOPRIL 80 MG: 20 TABLET ORAL at 05:00

## 2024-06-15 RX ADMIN — BENZONATATE 100 MG: 100 CAPSULE ORAL at 15:08

## 2024-06-15 RX ADMIN — OXYCODONE 2.5 MG: 5 TABLET ORAL at 21:42

## 2024-06-15 RX ADMIN — ACETAMINOPHEN 500 MG: 500 TABLET, FILM COATED ORAL at 05:00

## 2024-06-15 RX ADMIN — ACETAMINOPHEN 500 MG: 500 TABLET, FILM COATED ORAL at 11:03

## 2024-06-15 RX ADMIN — AMLODIPINE BESYLATE 10 MG: 5 TABLET ORAL at 05:00

## 2024-06-15 RX ADMIN — HYDRALAZINE HYDROCHLORIDE 75 MG: 50 TABLET ORAL at 17:21

## 2024-06-15 RX ADMIN — ACETAMINOPHEN 500 MG: 500 TABLET, FILM COATED ORAL at 21:42

## 2024-06-15 RX ADMIN — OXYCODONE 2.5 MG: 5 TABLET ORAL at 13:09

## 2024-06-15 RX ADMIN — HYDRALAZINE HYDROCHLORIDE 75 MG: 50 TABLET ORAL at 21:42

## 2024-06-15 RX ADMIN — ENOXAPARIN SODIUM 40 MG: 100 INJECTION SUBCUTANEOUS at 17:23

## 2024-06-15 RX ADMIN — GABAPENTIN 600 MG: 300 CAPSULE ORAL at 15:08

## 2024-06-15 RX ADMIN — Medication 5000 UNITS: at 08:14

## 2024-06-15 ASSESSMENT — PAIN DESCRIPTION - PAIN TYPE
TYPE: ACUTE PAIN

## 2024-06-15 NOTE — PROGRESS NOTES
NURSING DAILY NOTE    Name: Latanya Ferrera   Date of Admission: 5/29/2024   Admitting Diagnosis: Hemorrhagic stroke (HCC)  Attending Physician: Sofi Jules D.o.  Allergies: Pineapple    Safety  Patient Assist  Mod A  Patient Precautions  Fall Risk  Precaution Comments  R hemiplegia, R loss of sensation in hand, ICH  Bed Transfer Status  Standby Assist  Toilet Transfer Status   Contact Guard Assist  Assistive Devices  Rails, Wheelchair  Oxygen  None - Room Air  Diet/Therapeutic Dining  Current Diet Order   Procedures    Diet Order Diet: Regular     Pill Administration  whole  Agitated Behavioral Scale     ABS Level of Severity       Fall Risk  Has the patient had a fall this admission?   No  Carly Fisher Fall Risk Scoring  15, HIGH RISK  Fall Risk Safety Measures  bed alarm and chair alarm    Vitals  Temperature: 36.9 °C (98.5 °F)  Temp src: Oral  Pulse: 80  Respiration: 18  Blood Pressure: 125/70  Blood Pressure MAP (Calculated): 88 MM HG  BP Location: Left, Upper Arm  Patient BP Position: Jolley's Position     Oxygen  Pulse Oximetry: 96 %  O2 (LPM): 0  O2 Delivery Device: None - Room Air    Bowel and Bladder  Last Bowel Movement  06/14/24  Stool Type  Type 5: Soft blob with clear cut edges (passed easily)  Bowel Device  Bathroom  Continent  Bladder: Did not void   Bowel: No movement  Bladder Function  Urine Void (mL):  (large)  Number of Times Voided: 1  Urinary Options: Yes  Urine Color: Yellow  Genitourinary Assessment   Bladder Assessment (WDL):  Within Defined Limits  Ramirez Catheter: Not Applicable  Ramirez Reasons per MD Order: Acute urinary retention or bladder outlet obstruction  Ramirez Care: Given with Soap and Water  Urinary Elimination: Catheter (Document on LDA)  Urine Color: Yellow  Bladder Device: Bathroom  Bladder Scan: Post Void  $ Bladder Scan Results (mL): 100  Bladder Medications: Yes    Skin  Xavier Score   17  Sensory Interventions    Bed Types: Standard/Trauma Mattress  Skin Preventative Measures: Waffle Overlay  Moisture Interventions  Moisturizers/Barriers: Barrier Wipes  Containment Devices: Indwelling Catheter      Pain  Pain Rating Scale  7 - Focus of attention, prevents doing daily activities  Pain Location  Arm  Pain Location Orientation  Right  Pain Interventions   Declines    ADLs    Bathing   Shower, * * With Assistance from, Staff  Linen Change   Partial  Personal Hygiene     Chlorhexidine Bath      Oral Care  Brushed Teeth  Teeth/Dentures  Missing Teeth (Comments)  Shave     Nutrition Percentage Eaten  Lunch, Between 50-75% Consumed  Environmental Precautions  Treaded Slipper Socks on Patient  Patient Turns/Positioning  Patient Turns Self from Side to Side  Patient Turns Assistance/Tolerance  Assistance of One  Bed Positions  Bed Locked, Bed Controls On  Head of Bed Elevated  Self regulated      Psychosocial/Neurologic Assessment  Psychosocial Assessment  Psychosocial (WDL):  Within Defined Limits  Patient Behaviors: Fatigue  Family Behaviors: No Family Present  Neurologic Assessment  Neuro (WDL): Exceptions to WDL  Level of Consciousness: Alert  Orientation Level: Oriented X4  Cognition: Follows commands, Appropriate attention/concentration  Speech: Clear  Pupil Assesment: No  R Pupil Size (mm): 3  R Pupil Shape / Description: Round  R Pupil Reaction: Brisk  L Pupil Size (mm): 3  L Pupil Shape / Description: Round  L Pupil Reaction: Brisk  Motor Function/Sensation Assessment: Sensation, Motor strength  RUE Sensation: Numbness  Muscle Strength Right Arm: Fair Strength against Gravity but No Resistance  LUE Sensation: Full sensation  Muscle Strength Left Arm: Normal Strength Against Gravity and Full Resistance  RLE Sensation: Numbness  Muscle Strength Right Leg: Fair Strength against Gravity but No Resistance  LLE Sensation: Full sensation  Muscle Strength Left Leg: Normal Strength Against Gravity and Full Resistance  EENT (WDL):   Within Defined Limits    Cardio/Pulmonary Assessment  Edema   RLE Edema: Trace  LLE Edema: Trace  Respiratory Breath Sounds  RUL Breath Sounds: Clear  RML Breath Sounds: Clear  RLL Breath Sounds: Clear  OZIEL Breath Sounds: Clear  LLL Breath Sounds: Clear  Cardiac Assessment   Cardiac (WDL):  WDL Except

## 2024-06-15 NOTE — PROGRESS NOTES
NURSING DAILY NOTE    Name: Latanya Ferrera   Date of Admission: 5/29/2024   Admitting Diagnosis: Hemorrhagic stroke (HCC)  Attending Physician: Sofi Jules D.o.  Allergies: Pineapple    Safety  Patient Assist  Mod A  Patient Precautions  Fall Risk  Precaution Comments  R hemiplegia, R loss of sensation in hand, ICH  Bed Transfer Status  Standby Assist  Toilet Transfer Status   Contact Guard Assist  Assistive Devices  Rails, Wheelchair  Oxygen  None - Room Air  Diet/Therapeutic Dining  Current Diet Order   Procedures    Diet Order Diet: Regular     Pill Administration  whole  Agitated Behavioral Scale     ABS Level of Severity       Fall Risk  Has the patient had a fall this admission?   No  Carly Fisher Fall Risk Scoring  15, HIGH RISK  Fall Risk Safety Measures  bed alarm, chair alarm, and poor balance    Vitals  Temperature: 36.8 °C (98.3 °F)  Temp src: Oral  Pulse: 80  Respiration: 18  Blood Pressure: 112/64  Blood Pressure MAP (Calculated): 80 MM HG  BP Location: Left, Upper Arm  Patient BP Position: Supine     Oxygen  Pulse Oximetry: 96 %  O2 (LPM): 0  O2 Delivery Device: None - Room Air    Bowel and Bladder  Last Bowel Movement  06/14/24  Stool Type  Type 5: Soft blob with clear cut edges (passed easily)  Bowel Device  Bathroom  Continent  Bladder: Did not void   Bowel: No movement  Bladder Function  Urine Void (mL):  (large)  Number of Times Voided: 1  Urinary Options: Yes  Urine Color: Yellow  Genitourinary Assessment   Bladder Assessment (WDL):  Within Defined Limits  Ramirez Catheter: Not Applicable  Ramirez Reasons per MD Order: Acute urinary retention or bladder outlet obstruction  Ramirez Care: Given with Soap and Water  Urinary Elimination: Catheter (Document on LDA)  Urine Color: Yellow  Bladder Device: Bathroom  Bladder Scan: Post Void  $ Bladder Scan Results (mL): 100  Bladder Medications: Yes    Skin  Xavier Score   17  Sensory  Interventions   Bed Types: Standard/Trauma Mattress  Skin Preventative Measures: Waffle Overlay  Moisture Interventions  Moisturizers/Barriers: Barrier Wipes  Containment Devices: Indwelling Catheter      Pain  Pain Rating Scale  6 - Hard to ignore, avoid usual activities  Pain Location  Arm  Pain Location Orientation  Right  Pain Interventions   Medication (see MAR)    ADLs    Bathing   Shower, * * With Assistance from, Staff  Linen Change   Partial  Personal Hygiene     Chlorhexidine Bath      Oral Care  Brushed Teeth  Teeth/Dentures  Missing Teeth (Comments)  Shave     Nutrition Percentage Eaten  Snack, Between % Consumed  Environmental Precautions  Treaded Slipper Socks on Patient  Patient Turns/Positioning  Patient Turns Self from Side to Side  Patient Turns Assistance/Tolerance  Assistance of One  Bed Positions  Bed Locked, Bed Controls On  Head of Bed Elevated  Self regulated      Psychosocial/Neurologic Assessment  Psychosocial Assessment  Psychosocial (WDL):  Within Defined Limits  Patient Behaviors: Fatigue  Family Behaviors: No Family Present  Neurologic Assessment  Neuro (WDL): Exceptions to WDL  Level of Consciousness: Alert  Orientation Level: Oriented X4  Cognition: Follows commands, Appropriate attention/concentration  Speech: Clear  Pupil Assesment: No  R Pupil Size (mm): 3  R Pupil Shape / Description: Round  R Pupil Reaction: Brisk  L Pupil Size (mm): 3  L Pupil Shape / Description: Round  L Pupil Reaction: Brisk  Motor Function/Sensation Assessment: Sensation, Motor strength  RUE Sensation: Numbness  Muscle Strength Right Arm: Fair Strength against Gravity but No Resistance  LUE Sensation: Full sensation  Muscle Strength Left Arm: Normal Strength Against Gravity and Full Resistance  RLE Sensation: Numbness  Muscle Strength Right Leg: Fair Strength against Gravity but No Resistance  LLE Sensation: Full sensation  Muscle Strength Left Leg: Normal Strength Against Gravity and Full  Resistance  EENT (WDL):  Within Defined Limits    Cardio/Pulmonary Assessment  Edema   RLE Edema: Trace  LLE Edema: Trace  Respiratory Breath Sounds  RUL Breath Sounds: Clear  RML Breath Sounds: Clear  RLL Breath Sounds: Clear  OZIEL Breath Sounds: Clear  LLL Breath Sounds: Clear  Cardiac Assessment   Cardiac (WDL):  WDL Except

## 2024-06-16 ENCOUNTER — APPOINTMENT (OUTPATIENT)
Dept: PHYSICAL THERAPY | Facility: REHABILITATION | Age: 59
DRG: 057 | End: 2024-06-16
Attending: PHYSICAL MEDICINE & REHABILITATION
Payer: MEDICAID

## 2024-06-16 PROCEDURE — A9270 NON-COVERED ITEM OR SERVICE: HCPCS | Performed by: PHYSICAL MEDICINE & REHABILITATION

## 2024-06-16 PROCEDURE — 97110 THERAPEUTIC EXERCISES: CPT

## 2024-06-16 PROCEDURE — 700102 HCHG RX REV CODE 250 W/ 637 OVERRIDE(OP): Performed by: PHYSICAL MEDICINE & REHABILITATION

## 2024-06-16 PROCEDURE — 97116 GAIT TRAINING THERAPY: CPT

## 2024-06-16 PROCEDURE — 700111 HCHG RX REV CODE 636 W/ 250 OVERRIDE (IP): Mod: JZ | Performed by: PHYSICAL MEDICINE & REHABILITATION

## 2024-06-16 PROCEDURE — 97530 THERAPEUTIC ACTIVITIES: CPT

## 2024-06-16 PROCEDURE — 770010 HCHG ROOM/CARE - REHAB SEMI PRIVAT*

## 2024-06-16 RX ADMIN — HYDRALAZINE HYDROCHLORIDE 75 MG: 50 TABLET ORAL at 17:20

## 2024-06-16 RX ADMIN — ACETAMINOPHEN 500 MG: 500 TABLET, FILM COATED ORAL at 17:23

## 2024-06-16 RX ADMIN — GABAPENTIN 600 MG: 300 CAPSULE ORAL at 21:33

## 2024-06-16 RX ADMIN — OXYCODONE 2.5 MG: 5 TABLET ORAL at 12:14

## 2024-06-16 RX ADMIN — GABAPENTIN 600 MG: 300 CAPSULE ORAL at 14:46

## 2024-06-16 RX ADMIN — Medication 5000 UNITS: at 08:07

## 2024-06-16 RX ADMIN — HYDRALAZINE HYDROCHLORIDE 75 MG: 50 TABLET ORAL at 12:14

## 2024-06-16 RX ADMIN — LISINOPRIL 80 MG: 20 TABLET ORAL at 06:17

## 2024-06-16 RX ADMIN — OMEPRAZOLE 20 MG: 20 CAPSULE, DELAYED RELEASE ORAL at 08:08

## 2024-06-16 RX ADMIN — HYDRALAZINE HYDROCHLORIDE 75 MG: 50 TABLET ORAL at 21:33

## 2024-06-16 RX ADMIN — ENOXAPARIN SODIUM 40 MG: 100 INJECTION SUBCUTANEOUS at 17:20

## 2024-06-16 RX ADMIN — HYDRALAZINE HYDROCHLORIDE 75 MG: 50 TABLET ORAL at 08:09

## 2024-06-16 RX ADMIN — ACETAMINOPHEN 500 MG: 500 TABLET, FILM COATED ORAL at 12:15

## 2024-06-16 RX ADMIN — GABAPENTIN 600 MG: 300 CAPSULE ORAL at 08:07

## 2024-06-16 RX ADMIN — BENZONATATE 100 MG: 100 CAPSULE ORAL at 21:33

## 2024-06-16 RX ADMIN — BENZONATATE 100 MG: 100 CAPSULE ORAL at 08:07

## 2024-06-16 RX ADMIN — BENZONATATE 100 MG: 100 CAPSULE ORAL at 14:46

## 2024-06-16 RX ADMIN — AMLODIPINE BESYLATE 10 MG: 5 TABLET ORAL at 06:17

## 2024-06-16 RX ADMIN — OXYCODONE 2.5 MG: 5 TABLET ORAL at 17:23

## 2024-06-16 ASSESSMENT — GAIT ASSESSMENTS
DISTANCE (FEET): 480
GAIT LEVEL OF ASSIST: STANDBY ASSIST
DEVIATION: DECREASED TOE OFF;DECREASED BASE OF SUPPORT
ASSISTIVE DEVICE: FRONT WHEEL WALKER

## 2024-06-16 ASSESSMENT — PAIN DESCRIPTION - PAIN TYPE: TYPE: ACUTE PAIN

## 2024-06-16 NOTE — THERAPY
Physical Therapy   Daily Treatment     Patient Name: Latanya Ferrera  Age:  58 y.o., Sex:  female  Medical Record #: [id number]  Today's Date: 6/16/2024     Precautions  Precautions: Fall Risk  Comments: R hemiplegia    Subjective    Pt received in bed and willing to participate in therapy.     Objective     06/16/24 0901   PT Charge Group   PT Gait Training (Units) 2   PT Therapeutic Exercise (Units) 1   PT Therapeutic Activities (Units) 1   PT Total Time Spent   PT Individual Total Time Spent (Mins) 60   Precautions   Precautions Fall Risk   Comments R hemiplegia   Vitals   O2 Delivery Device None - Room Air   Pain   Intervention Declines   Gait Functional Level of Assist    Gait Level Of Assist Standby Assist   Assistive Device Front Wheel Walker   Distance (Feet) 480  (36ft, 71ft x3, 231ft)   Deviation Decreased Toe Off;Decreased Base Of Support  (metronome cues by therapist during 2 bouts of 71ft  to increase step symmetry, v/c for symmetrical step length; some carry over during next bouts of ambulation w/o metranome, however decreased symmetry w/ fatigue onset; improved again w/ metranome)   Wheelchair Functional Level of Assist   Wheelchair Assist Supervised   Distance Wheelchair (Feet or Distance) 231   Wheelchair Description Extra time   Transfer Functional Level of Assist   Bed, Chair, Wheelchair Transfer Standby Assist   Bed Chair Wheelchair Transfer Description   (Stand pivot transfer w/ SPV)   Bed Mobility    Supine to Sit Modified Independent   Sit to Supine Modified Independent   Sit to Stand Standby Assist   Scooting Modified Independent   Rolling Modified Independent   Interdisciplinary Plan of Care Collaboration   Patient Position at End of Therapy In Bed;Call Light within Reach;Tray Table within Reach;Phone within Reach   Strengths & Barriers   Strengths Able to follow instructions;Alert and oriented;Independent prior level of function;Motivated for self care and independence;Pleasant and  "cooperative;Supportive family;Willingly participates in therapeutic activities   Barriers Decreased endurance;Difficulty following instructions;Fatigue;Hemiplegia;Home accessibility;Hypertension;Impaired balance;Impaired carryover of learning;Limited mobility  (lives alone, decreased knowledge of condition, limited daytime family support)     Deadlifts w/ 5# DB on L and 5# ankle weight on R, 3 x 10    Tall kneeling w/ UE support on box, hip hinges, 1 x 10  Tall kneeling into L LE forward 1/2 kneeling w/ UE support on box, 1 x 10    Education provided on neuroplasticity.    Assessment  Pt demonstrated improvements w/ symmetrical step length w/ metronome beat provided by therapist; some carryover in subsequent ambulation bouts before fatigue onset. Pt completed components of floor recovery w/ SPV and can progress to completing a full floor transfer.    Strengths: Able to follow instructions, Alert and oriented, Independent prior level of function, Motivated for self care and independence, Pleasant and cooperative, Supportive family, Willingly participates in therapeutic activities  Barriers: Decreased endurance, Difficulty following instructions, Fatigue, Hemiplegia, Home accessibility, Hypertension, Impaired balance, Impaired carryover of learning, Limited mobility (lives alone, decreased knowledge of condition, limited daytime family support)    Plan    Floor recovery  Continue gait and pregait without UE support vs FWW vs cane and FES as tolerated   AFO trial: Xtern vs posterior DF assist   TM vs OG gait training c/ metronome for rhythm/rita  Pregait to gait with dec UE support- stepping and weight acceptance/stance stability  Push/pulls c/ resistance   Ankle extrinsic strength and coordination, hams and hip ext strength (pulling, hip hinge, deadlift patterns)  Functional endurance and OOB time   Repeat FT c/ daughter prior to d/c     DME  PT DME Recommendations  Wheelchair: 18\" Width, Jaciel Height (16\"-17\"), " Lightweight, Removable/Flip Back Armrests, Standard Leg Rests, Anti-tippers  Cushion: Standard  Assistive Device: Front Wheeled Walker  Additional Equipment: Other (Comments) (R AFO)    Passport items to be completed:  Get in/out of bed safely, in/out of a vehicle, safely use mobility device, walk or wheel around home/community, navigate up and down stairs, show how to get up/down from the ground, ensure home is accessible, demonstrate HEP, complete caregiver training    Physical Therapy Problems (Active)       Problem: PT-Long Term Goals       Dates: Start:  05/30/24         Goal: LTG-By discharge, patient will ambulate >/= 300' c/ LRAD and CGA or better.        Dates: Start:  05/30/24            Goal: LTG-By discharge, patient will transfer one surface to another c/ Boni.        Dates: Start:  05/30/24            Goal: LTG-By discharge, patient will ambulate up/down 2 stairs c/ CGA or better to access home.        Dates: Start:  05/30/24            Goal: LTG-By discharge, patient will transfer in/out of a car c/ CGA or better.        Dates: Start:  05/30/24

## 2024-06-17 ENCOUNTER — APPOINTMENT (OUTPATIENT)
Dept: PHYSICAL THERAPY | Facility: REHABILITATION | Age: 59
DRG: 057 | End: 2024-06-17
Attending: PHYSICAL MEDICINE & REHABILITATION
Payer: MEDICAID

## 2024-06-17 ENCOUNTER — APPOINTMENT (OUTPATIENT)
Dept: OCCUPATIONAL THERAPY | Facility: REHABILITATION | Age: 59
DRG: 057 | End: 2024-06-17
Attending: PHYSICAL MEDICINE & REHABILITATION
Payer: MEDICAID

## 2024-06-17 PROCEDURE — 770010 HCHG ROOM/CARE - REHAB SEMI PRIVAT*

## 2024-06-17 PROCEDURE — 99232 SBSQ HOSP IP/OBS MODERATE 35: CPT | Performed by: PHYSICAL MEDICINE & REHABILITATION

## 2024-06-17 PROCEDURE — 97112 NEUROMUSCULAR REEDUCATION: CPT

## 2024-06-17 PROCEDURE — 97535 SELF CARE MNGMENT TRAINING: CPT

## 2024-06-17 PROCEDURE — 97110 THERAPEUTIC EXERCISES: CPT

## 2024-06-17 PROCEDURE — 700111 HCHG RX REV CODE 636 W/ 250 OVERRIDE (IP): Mod: JZ | Performed by: PHYSICAL MEDICINE & REHABILITATION

## 2024-06-17 PROCEDURE — A9270 NON-COVERED ITEM OR SERVICE: HCPCS | Performed by: PHYSICAL MEDICINE & REHABILITATION

## 2024-06-17 PROCEDURE — 97116 GAIT TRAINING THERAPY: CPT

## 2024-06-17 PROCEDURE — 97530 THERAPEUTIC ACTIVITIES: CPT

## 2024-06-17 PROCEDURE — 700102 HCHG RX REV CODE 250 W/ 637 OVERRIDE(OP): Performed by: PHYSICAL MEDICINE & REHABILITATION

## 2024-06-17 RX ADMIN — ACETAMINOPHEN 500 MG: 500 TABLET, FILM COATED ORAL at 17:23

## 2024-06-17 RX ADMIN — AMLODIPINE BESYLATE 10 MG: 5 TABLET ORAL at 06:26

## 2024-06-17 RX ADMIN — OXYCODONE 2.5 MG: 5 TABLET ORAL at 12:59

## 2024-06-17 RX ADMIN — HYDRALAZINE HYDROCHLORIDE 75 MG: 50 TABLET ORAL at 19:46

## 2024-06-17 RX ADMIN — HYDRALAZINE HYDROCHLORIDE 75 MG: 50 TABLET ORAL at 08:47

## 2024-06-17 RX ADMIN — BENZONATATE 100 MG: 100 CAPSULE ORAL at 08:47

## 2024-06-17 RX ADMIN — GABAPENTIN 600 MG: 300 CAPSULE ORAL at 08:47

## 2024-06-17 RX ADMIN — BENZONATATE 100 MG: 100 CAPSULE ORAL at 19:46

## 2024-06-17 RX ADMIN — HYDRALAZINE HYDROCHLORIDE 75 MG: 50 TABLET ORAL at 17:22

## 2024-06-17 RX ADMIN — GABAPENTIN 600 MG: 300 CAPSULE ORAL at 19:46

## 2024-06-17 RX ADMIN — OXYCODONE 2.5 MG: 5 TABLET ORAL at 17:23

## 2024-06-17 RX ADMIN — ENOXAPARIN SODIUM 40 MG: 100 INJECTION SUBCUTANEOUS at 17:19

## 2024-06-17 RX ADMIN — LISINOPRIL 80 MG: 20 TABLET ORAL at 06:26

## 2024-06-17 RX ADMIN — OMEPRAZOLE 20 MG: 20 CAPSULE, DELAYED RELEASE ORAL at 08:47

## 2024-06-17 RX ADMIN — OXYCODONE 2.5 MG: 5 TABLET ORAL at 03:22

## 2024-06-17 RX ADMIN — HYDRALAZINE HYDROCHLORIDE 75 MG: 50 TABLET ORAL at 12:57

## 2024-06-17 RX ADMIN — GABAPENTIN 600 MG: 300 CAPSULE ORAL at 14:53

## 2024-06-17 RX ADMIN — BENZONATATE 100 MG: 100 CAPSULE ORAL at 14:53

## 2024-06-17 RX ADMIN — Medication 5000 UNITS: at 08:47

## 2024-06-17 ASSESSMENT — GAIT ASSESSMENTS
ASSISTIVE DEVICE: FRONT WHEEL WALKER
GAIT LEVEL OF ASSIST: STANDBY ASSIST
DEVIATION: DECREASED BASE OF SUPPORT;BRADYKINETIC;DECREASED HEEL STRIKE;DECREASED TOE OFF
ASSISTIVE DEVICE: FRONT WHEEL WALKER
DISTANCE (FEET): 120
GAIT LEVEL OF ASSIST: SUPERVISED
DISTANCE (FEET): 200

## 2024-06-17 ASSESSMENT — ACTIVITIES OF DAILY LIVING (ADL)
BED_CHAIR_WHEELCHAIR_TRANSFER_DESCRIPTION: SUPERVISION FOR SAFETY
BED_CHAIR_WHEELCHAIR_TRANSFER_DESCRIPTION: SET-UP OF EQUIPMENT;SUPERVISION FOR SAFETY
TOILET_TRANSFER_DESCRIPTION: GRAB BAR;SUPERVISION FOR SAFETY
BED_CHAIR_WHEELCHAIR_TRANSFER_DESCRIPTION: SET-UP OF EQUIPMENT;VERBAL CUEING

## 2024-06-17 ASSESSMENT — PAIN DESCRIPTION - PAIN TYPE: TYPE: ACUTE PAIN

## 2024-06-17 ASSESSMENT — PATIENT HEALTH QUESTIONNAIRE - PHQ9
2. FEELING DOWN, DEPRESSED, IRRITABLE, OR HOPELESS: NOT AT ALL
1. LITTLE INTEREST OR PLEASURE IN DOING THINGS: NOT AT ALL
SUM OF ALL RESPONSES TO PHQ9 QUESTIONS 1 AND 2: 0

## 2024-06-17 NOTE — PROGRESS NOTES
NURSING DAILY NOTE    Name: Latanya Ferrera   Date of Admission: 5/29/2024   Admitting Diagnosis: Hemorrhagic stroke (HCC)  Attending Physician: Sofi Jules D.o.  Allergies: Pineapple    Safety  Patient Assist  Min assist  Patient Precautions  Fall Risk  Precaution Comments  R hemiplegia  Bed Transfer Status  Standby Assist  Toilet Transfer Status   Contact Guard Assist  Assistive Devices  Rails, Wheelchair  Oxygen  None - Room Air  Diet/Therapeutic Dining  Current Diet Order   Procedures    Diet Order Diet: Regular     Pill Administration  whole  Agitated Behavioral Scale     ABS Level of Severity       Fall Risk  Has the patient had a fall this admission?   No  Carly Fisher Fall Risk Scoring  15, HIGH RISK  Fall Risk Safety Measures  bed alarm and chair alarm    Vitals  Temperature: 36.8 °C (98.2 °F)  Temp src: Oral  Pulse: 80  Respiration: 18  Blood Pressure: 131/69  Blood Pressure MAP (Calculated): 90 MM HG  BP Location: Left, Upper Arm  Patient BP Position: Supine     Oxygen  Pulse Oximetry: 99 %  O2 (LPM): 0  O2 Delivery Device: None - Room Air    Bowel and Bladder  Last Bowel Movement  06/16/24  Stool Type  Type 5: Soft blob with clear cut edges (passed easily)  Bowel Device  Bathroom  Continent  Bladder: Did not void   Bowel: No movement  Bladder Function  Urine Void (mL):  (Large)  Number of Times Voided: 1  Urinary Options: Yes  Urine Color: Yellow  Genitourinary Assessment   Bladder Assessment (WDL):  Within Defined Limits  Ramirez Catheter: Not Applicable  Ramirez Reasons per MD Order: Acute urinary retention or bladder outlet obstruction  Ramirez Care: Given with Soap and Water  Urinary Elimination: Catheter (Document on LDA)  Urine Color: Yellow  Bladder Device: Bathroom  Bladder Scan: Post Void  $ Bladder Scan Results (mL): 100  Bladder Medications: Yes    Skin  Xavier Score   17  Sensory Interventions   Bed Types: Standard/Trauma Mattress with  Overlay  Skin Preventative Measures: Waffle Overlay  Moisture Interventions  Moisturizers/Barriers: Barrier Cream  Containment Devices: Indwelling Catheter      Pain  Pain Rating Scale  4 - Distracts me, can do usual activities  Pain Location  Leg  Pain Location Orientation  Right  Pain Interventions   Declines    ADLs    Bathing   Shower, * * With Assistance from, Staff  Linen Change   Complete  Personal Hygiene     Chlorhexidine Bath      Oral Care  Brushed Teeth  Teeth/Dentures  Missing Teeth (Comments)  Shave     Nutrition Percentage Eaten  Dinner, Refused  Environmental Precautions  Treaded Slipper Socks on Patient, Bed in Low Position  Patient Turns/Positioning  Patient Turns Self from Side to Side  Patient Turns Assistance/Tolerance  Assistance of One  Bed Positions  Bed Locked, Bed Controls On  Head of Bed Elevated  Self regulated      Psychosocial/Neurologic Assessment  Psychosocial Assessment  Psychosocial (WDL):  Within Defined Limits  Patient Behaviors: Fatigue  Family Behaviors: No Family Present  Neurologic Assessment  Neuro (WDL): Exceptions to WDL  Level of Consciousness: Alert  Orientation Level: Oriented X4  Cognition: Follows commands, Appropriate attention/concentration  Speech: Clear  Pupil Assesment: No  R Pupil Size (mm): 3  R Pupil Shape / Description: Round  R Pupil Reaction: Brisk  L Pupil Size (mm): 3  L Pupil Shape / Description: Round  L Pupil Reaction: Brisk  Motor Function/Sensation Assessment: Sensation, Motor strength  RUE Sensation: Numbness  Muscle Strength Right Arm: Fair Strength against Gravity but No Resistance  LUE Sensation: Full sensation  Muscle Strength Left Arm: Normal Strength Against Gravity and Full Resistance  RLE Sensation: Numbness  Muscle Strength Right Leg: Fair Strength against Gravity but No Resistance  LLE Sensation: Full sensation  Muscle Strength Left Leg: Normal Strength Against Gravity and Full Resistance  EENT (WDL):  Within Defined  Limits    Cardio/Pulmonary Assessment  Edema   RLE Edema: Trace  LLE Edema: Trace  Respiratory Breath Sounds  RUL Breath Sounds: Clear  RML Breath Sounds: Clear  RLL Breath Sounds: Clear  OZIEL Breath Sounds: Clear  LLL Breath Sounds: Clear  Cardiac Assessment   Cardiac (WDL):  WDL Except        [facility]

## 2024-06-17 NOTE — THERAPY
Occupational Therapy  Daily Treatment     Patient Name: Latanya Ferrera  Age:  58 y.o., Sex:  female  Medical Record #: [id number]  Today's Date: 6/17/2024     Precautions  Precautions: Fall Risk  Comments: R hemiplegia         Subjective  Pt supine in bed upon arrival. Pleasant and agreeable to therapy. Stated that she has been getting up to use the bathroom on her own when nursing staff is taking too long.     Objective       06/17/24 0931   Functional Level of Assist   Bed, Chair, Wheelchair Transfer Supervised   Bed Chair Wheelchair Transfer Description   (stand-pivot bed<>w/c)   Tub / Shower Transfers Supervised   Tub Shower Transfer Description   (FWW<>tub transfer bench)   Interdisciplinary Plan of Care Collaboration   Patient Position at End of Therapy In Bed;Call Light within Reach;Tray Table within Reach;Phone within Reach     Functional Mobility:  -Main gym to outside @ w/c level supervision  -110ft outside w/ FWW @ SBA  -Back gym to room @ w/c level supervision    Pt was educated on using the call light when she needs to use the restroom. Reinforced that she could have a fall if she continues to go to the bathroom on her own. Pt verbalized understanding but will likely continue to get up w/o supervision.     Assessment  Pt demo slight impulsivity throughout session and tended to rush through tasks. Needed verbal cues for safety re hand placement during tub transfer. Pt able to walk w/ FWW w/o any LOB but demonstrated poor posture and R foot drag when fatigued. Pt was unable to recognize her fatigue and needed verbal cues to take RB.    Strengths: Able to follow instructions, Good insight into deficits/needs, Independent prior level of function, Manages pain appropriately, Motivated for self care and independence, Pleasant and cooperative, Supportive family, Willingly participates in therapeutic activities  Barriers: Decreased endurance, Hemiparesis, Home accessibility, Impaired activity tolerance, Impaired  balance (Impaired coordination/motor control)    Plan  Refer to primary OT for POC:  ADLs, safety with transfers, neuro re-ed R UE, standing tolerance/balance,  3x/week with therapy techs for adjunct therapy       Occupational Therapy Goals (Active)       Problem: OT Long Term Goals       Dates: Start:  05/30/24         Goal: LTG-By discharge, patient will complete basic self care tasks with SPV-MOD I and LRAD       Dates: Start:  05/30/24            Goal: LTG-By discharge, patient will perform bathroom transfers with SPV-MOD I and LRAD       Dates: Start:  05/30/24

## 2024-06-17 NOTE — THERAPY
"Physical Therapy   Daily Treatment     Patient Name: Latanya Ferrera  Age:  58 y.o., Sex:  female  Medical Record #: [id number]  Today's Date: 6/17/2024     Precautions  Precautions: Fall Risk  Comments: R hemiplegia    Subjective    \"I was dozing off waiting for you.\" Pt in bed at arrival, agreeable to PT tx.      Objective       06/17/24 1231   PT Charge Group   PT Gait Training (Units) 1   PT Therapeutic Activities (Units) 3   PT Total Time Spent   PT Individual Total Time Spent (Mins) 60   Gait Functional Level of Assist    Gait Level Of Assist Supervised   Assistive Device Front Wheel Walker   Distance (Feet) 200   # of Times Distance was Traveled 2  (2 x 120)   Deviation Decreased Base Of Support;Decreased Toe Off;Shuffled Gait   Stairs Functional Level of Assist   Level of Assist with Stairs Supervised   # of Stairs Climbed 16  (3 x 4 @ 6\"H c/ BHR, step to vs reciprocal up, step to down; 2 x 2 @4\"H c/ FWW, step to up and down)   Stairs Description Extra time;Hand rails;Supervision for safety;Requires incidental assist;Walker  (CGA c/ 2 steps and FWW)   Transfer Functional Level of Assist   Bed, Chair, Wheelchair Transfer Supervised   Bed Chair Wheelchair Transfer Description Supervision for safety  (stand step, occasional VC for FWW management)   Bed Mobility    Supine to Sit Modified Independent   Sit to Supine Modified Independent   Sit to Stand Modified Independent   Scooting Modified Independent   Rolling Modified Independent   Neuro-Muscular Treatments   Neuro-Muscular Treatments Sequencing   Comments c/ stairs training, floor transfers   PT DME Recommendations   Wheelchair 18\" Width;Jaciel Height (16\"-17\");Lightweight;Removable/Flip Back Armrests;Standard Leg Rests;Anti-tippers   Cushion Standard   Assistive Device Front Wheeled Walker   Additional Equipment Other (Comments)  (R AFO)   Physical Therapist Assigned   Assigned PT / Treatment Time / Comments keep c/ Crhistina. 60-90   1 Step (Curb)   Assistance Needed " "Incidental touching;Adaptive equipment;Verbal cues   CARE Score - 1 Step (Curb) 4   4 Steps   Assistance Needed Supervision;Verbal cues   CARE Score - 4 Steps 4   12 Steps   Assistance Needed Supervision;Verbal cues   CARE Score - 12 Steps 4     TA:  Gait on level surfaces c/ FWW c/ AFO trial: Xtern vs posterior assist; pt expressed preference for posterior assist with improved step symmetry and foot clearance during gait; VC for step rita and symmetry  1 x 200'  2 x 120'  20-50' btw therapy activities  Stair training:  3 x 4 @ 6\"H c/ BHR, step to pattern c/ trial of step over step ascending, VC to place RLE completely on step, hip circumduction and hiking noted on RLE to clear foot onto step when ascending  2 x 2 @4\"H c/ FWW to simulate home entry, CGA improving to SBA on 2nd effort; VC and initial demo for sequencing  Floor recovery: full practice seated on mat <>supine on floor; transition supine>sit>scoot>1/2 kneel> plantigrade>seated on mat, SBA  Discussed home safety strategies: walking c/ supervision, Life Alert vs smart speaker  Plan for FT on day of d/c to review mobility strategies, called daughter to confirm  Car transfers: full practice transfer <> passenger side of SUV, SetupA   Seated rests btw activities.     Assessment    Latanya continues with progress in gait rita and general mobility. Continues with bilateral hip weakness that increases stance instability c/ fatigue. Good tolerance to R posterior assist AFO, order submitted for pt today. Scheduled FT with daughter to review home safety on day of d/c.      Strengths: Able to follow instructions, Alert and oriented, Independent prior level of function, Motivated for self care and independence, Pleasant and cooperative, Supportive family, Willingly participates in therapeutic activities  Barriers: Decreased endurance, Difficulty following instructions, Fatigue, Hemiplegia, Home accessibility, Hypertension, Impaired balance, Impaired carryover of " "learning, Limited mobility (lives alone, decreased knowledge of condition, limited daytime family support)    Plan  R AFO ordered   FT day of d/c for car transfers, floor transfers, home safety, gait and stairs c/ FWW and AFO   Continue stance stability training, hip abd, hams and gluts, hip extension, ankle extrinsics (heel raises)  HEP: standing balance and stepping, LE strength: STS, modified SL balance, heel raises, stepping all directions, bridges     DME  PT DME Recommendations  Wheelchair: 18\" Width, Jaciel Height (16\"-17\"), Lightweight, Removable/Flip Back Armrests, Standard Leg Rests, Anti-tippers  Cushion: Standard  Assistive Device: Front Wheeled Walker  Additional Equipment: Other (Comments) (R AFO)    Passport items to be completed:  , safely use mobility device, walk or wheel around home/community, navigate up and down stairs, , demonstrate HEP, complete caregiver training    Physical Therapy Problems (Active)       Problem: PT-Long Term Goals       Dates: Start:  05/30/24         Goal: LTG-By discharge, patient will transfer one surface to another c/ Boni.        Dates: Start:  05/30/24            Goal: LTG-By discharge, patient will ambulate up/down 2 stairs c/ CGA or better to access home.        Dates: Start:  05/30/24              "

## 2024-06-17 NOTE — THERAPY
Physical Therapy   Daily Treatment     Patient Name: Latanya Ferrera  Age:  58 y.o., Sex:  female  Medical Record #: [id number]  Today's Date: 6/17/2024     Precautions  Precautions: Fall Risk  Comments: R hemiplegia    Subjective    Patient pleasant and very motivated to participate. Requests to work on walking this morning.      Objective       06/17/24 1031   PT Charge Group   PT Gait Training (Units) 2   PT Neuromuscular Re-Education / Balance (Units) 1   PT Therapeutic Activities (Units) 1   PT Total Time Spent   PT Individual Total Time Spent (Mins) 60   Gait Functional Level of Assist    Gait Level Of Assist Standby Assist   Assistive Device Front Wheel Walker   Distance (Feet) 120  (+ 2 x 80; turn negotiation in various directions and around obstacles)   # of Times Distance was Traveled 2   Deviation Decreased Base Of Support;Bradykinetic;Decreased Heel Strike;Decreased Toe Off   Transfer Functional Level of Assist   Bed, Chair, Wheelchair Transfer Supervised   Bed Chair Wheelchair Transfer Description Set-up of equipment;Supervision for safety  (stand step bed <> WC, no AD)   Toilet Transfers Standby Assist   Toilet Transfer Description Grab bar;Supervision for safety   Bed Mobility    Supine to Sit Modified Independent   Sit to Supine Modified Independent   Sit to Stand Standby Assist   Neuro-Muscular Treatments   Comments   Forward and retro gait in // bars with single UE support (L UE)  -3 sets of 6 x 10' each; cuing for step-through    Lateral stepping in // bars with light UE support 3 sets of 4 x 10'     Interdisciplinary Plan of Care Collaboration   IDT Collaboration with  Physician   Patient Position at End of Therapy In Bed;Call Light within Reach;Tray Table within Reach;Phone within Reach   Collaboration Comments Physician completed rounds during session         Assessment    Patient with good tolerance to activity this morning, despite reports of increased fatigue. She had no episodes of LOB or R toe  "catch during ambulation, does continue to rely heavily on UE support on FWW. Patient remains well-motivated to participate.     Strengths: Able to follow instructions, Alert and oriented, Independent prior level of function, Motivated for self care and independence, Pleasant and cooperative, Supportive family, Willingly participates in therapeutic activities  Barriers: Decreased endurance, Difficulty following instructions, Fatigue, Hemiplegia, Home accessibility, Hypertension, Impaired balance, Impaired carryover of learning, Limited mobility (lives alone, decreased knowledge of condition, limited daytime family support)    Plan    Floor recovery  Continue gait and pregait without UE support vs FWW vs cane and FES as tolerated   AFO trial: Xtern vs posterior DF assist   TM vs OG gait training c/ metronome for rhythm/rita  Pregait to gait with dec UE support- stepping and weight acceptance/stance stability  Push/pulls c/ resistance   Ankle extrinsic strength and coordination, hams and hip ext strength (pulling, hip hinge, deadlift patterns)  Functional endurance and OOB time   Repeat FT c/ daughter prior to d/c     DME  PT DME Recommendations  Wheelchair: 18\" Width, Jaciel Height (16\"-17\"), Lightweight, Removable/Flip Back Armrests, Standard Leg Rests, Anti-tippers  Cushion: Standard  Assistive Device: Front Wheeled Walker  Additional Equipment: Other (Comments) (R AFO)    Passport items to be completed:  Get in/out of bed safely, in/out of a vehicle, safely use mobility device, walk or wheel around home/community, navigate up and down stairs, show how to get up/down from the ground, ensure home is accessible, demonstrate HEP, complete caregiver training    Physical Therapy Problems (Active)       Problem: PT-Long Term Goals       Dates: Start:  05/30/24         Goal: LTG-By discharge, patient will transfer one surface to another c/ Boni.        Dates: Start:  05/30/24            Goal: LTG-By discharge, patient " will ambulate up/down 2 stairs c/ CGA or better to access home.        Dates: Start:  05/30/24

## 2024-06-17 NOTE — PROGRESS NOTES
"  Physical Medicine & Rehabilitation Progress Note    Encounter Date: 6/17/2024    Chief Complaint: Wants to go home.     Interval Events (Subjective):  LAURA ALVAREZ 6/17  Voiding     Seen and examined in her room working with therapy. She is feeling much better. She is glad for the extra time, but wants to go home. She is still feeling improvement in her right sided numbness as it is now tingling and feeling like it is waking up. Denies other issues which are concerning to her.       ROS: 14 point ROS negative unless otherwise specified in the HPI    Objective:  VITAL SIGNS: /71   Pulse 94   Temp 36.3 °C (97.3 °F) (Temporal)   Resp 18   Ht 1.676 m (5' 6\")   Wt 81.6 kg (180 lb)   SpO2 98%   BMI 29.05 kg/m²     GEN: No apparent distress  HEENT: Head normocephalic, atraumatic.  Sclera nonicteric bilaterally, no ocular discharge appreciated bilaterally.  CV: Extremities warm and well-perfused, no peripheral edema appreciated bilaterally.  PULMONARY: Breathing nonlabored on room air, no respiratory accessory muscle use.  Not requiring supplemental oxygen.  ABD: Soft, nontender.  SKIN: No appreciable skin breakdown on exposed areas of skin.  PSYCH: Mood and affect within normal limits.  NEURO: Awake alert.  Conversational.  Logical thought content. Impaired sensation RUE and RLE and R face, but improving. Right hemiparesis really improving.     Laboratory Values:  No results found for this or any previous visit (from the past 72 hour(s)).      Medications:  Scheduled Medications   Medication Dose Frequency    gabapentin  600 mg TID    lisinopril  80 mg Q DAY    hydrALAZINE  75 mg 4X/DAY    benzonatate  100 mg TID    vitamin D3  5,000 Units DAILY    enoxaparin (LOVENOX) injection  40 mg DAILY AT 1800    Pharmacy Consult Request  1 Each PHARMACY TO DOSE    omeprazole  20 mg DAILY    amLODIPine  10 mg Q DAY     PRN medications: docusate sodium, senna-docusate **AND** polyethylene glycol/lytes, oxyCODONE " immediate-release, Respiratory Therapy Consult, hydrALAZINE, bisacodyl, magnesium hydroxide, acetaminophen, carboxymethylcellulose, benzocaine-menthol, mag hydrox-al hydrox-simeth, ondansetron **OR** ondansetron, traZODone, sodium chloride, ipratropium-albuterol, melatonin    Diet:  Current Diet Order   Procedures    Diet Order Diet: Regular     CT 5/24/24 [facility]      CT 6/5/24 [facility]      Study is compared to prior from 5/28/2024. Since the previous study the left basal ganglia/thalamic hemorrhage has decreased in size. Previously was 1.7 x 1.3 cm and currently 1.2 x 1.0 cm. Surrounding vasogenic edema is similar.       Medical Decision Making and Plan:  Left Basal Ganglia ICH secondary to hypertensive emergency  Right Hemiparesis  PT and OT for mobility and ADLs. Per guidelines, 15 hours per week between PT, OT and/or SLP.  Follow-up Neurology (Dr. Babb)  Secondary stroke ppx: Anti-hypertensives    6/12 BP finally controlled and < 140 SBP    Headache  Neck Pain  6/3 VSS. No other symptoms  6/4 Resolved. Tylenol relieved her symptoms. No change in neuro status.   6/5 Continues to have headache which returned last night severely. Recently started on DVT ppx. STAT CTH to ensure no expansion of bleed. CTH showing subacute ICH. D/w rads directly 6/5/2024 3:05 PM - not concerned for it being acute, looks older, but will get [facility] for formal comparison. Hold Lovenox for now to error on side of caution, recent US BLE neg for DVT. Restarted on Lovenox without issue.   6/6 Oxycodone 2.5mg daily PRN headache  Resolved      Hypertension  Continue Hydralazine 50mg q6 hours --> 6/5 75mg every 6 hours  Continue Lisinopril 40mg daily --> 6/7 60mg daily --> 6/10 Increase to 80 mg daily   Continue Amlodipine 10mg daily   6/17 BP controlled      Leukocytosis  Improving on admission but unclear cause  Continue Rocephin x 2 doses (through 5/31) - ok to d/c IV after  6/14 stable 10.8 - stays in 10's     Urinary Retention  Has otero -  required CIC   Resolved. Off of flomax    Chronic Cough  Secondary to years of smoking. D/w patient and daughter, nothing has changed in terms of her cough, nothing new or concerning to them.   Tessalon perls scheduled TID    Hyperphosphatemia  6/3 Resolved, Ph normal     Anemia   Stable in 10's     Neuropathic Pain  6/3 Continue Gabapentin - increase to 400mg TID -->  600mg TID     Bowel    Having regular bowel movements     Upcoming Labs/imagin/18     DVT PROPHYLAXIS: None - Hemorrhagic stroke. Check US - negative. Per Neuro note, clear for DVT chemo-ppx once MRI completed (completed ). Lovenox 40 mg SQ started 6/3. Hold Lovenox 2024 until can formally compare CTH but per d/w radiologist directly low c/f acute bleed as appears older. Reviewed personally and no acute finding on new CTH.  Restart Lovenox ppx.      HOSPITALIST FOLLOWING: No     CODE STATUS: FULL CODE     DISPO: Home with minimal support     VIANCA: 24 - Requesting extension as patient making really good progress     MEDS SENT TO: [facility]     DISCHARGE SPECIALIST FOLLOW UP: Neurology     Patient to scheduled follow up with their PCP within 2 weeks from discharge from the [facility].     Dr. Sofi Jules DO, MS  Department of Physical Medicine & Rehabilitation    _____________________________________  Interdisciplinary Team Conference   Most recent IDT on 2024    I, Dr. Sofi Jules DO, MS, was present and led the interdisciplinary team conference on 2024.  I led the IDT conference and agree with the IDT conference documentation and plan of care as noted below.     Nursing:  Diet Current Diet Order   Procedures    Diet Order Diet: Regular       Eating ADL Modified Independent  Set-up of equipment or meal/tube feeding, Insert dentures   % of Last Meal  Oral Nutrition: *  * Meal *  *, Between 50-75% Consumed   Sleep    Bowel Last BM: 24   Bladder Continent    Doesn't  have pain often.     Physical Therapy:  Bed Mobility    Transfers Standby Assist   (Stand pivot transfer w/ SPV)   Mobility Standby Assist   Stairs    Continues to make really good progress.     Occupational Therapy:  Grooming Supervision, Seated   Bathing  (Patient deferred at this time, reporting she bathed with her daughter yesterday)   UB Dressing Supervision   LB Dressing Contact Guard Assist   Toileting Contact Guard Assist   Shower & Transfer    Met all of OT goals.   Will not need physical assist but verbal cues for safety and SBA to supervision.     Speech-Language Pathology:  Comprehension:  Modified Independent  Comprehension Description:  Glasses  Expression:  Independent   Social Interaction:  Independent  Problem Solving:  Supervision  Problem Solving Description:  Verbal cueing, Therapy schedule, Bed/chair alarm  Memory:  Minimal Assist  Memory Description:  Verbal cueing, Therapy schedule, Bed/chair alarm, Increased time    Signed off.     Respiratory Therapy:  O2 (LPM): 0  O2 Delivery Device: None - Room Air      Case Management:  Continues to work on disposition and DME needs.     BARRIERS TO DISCHARGE HOME:  Family training  Equipment  OP vs  services -    Medical Stability     Discharge Date/Disposition:  06/20/24  _____________________________________

## 2024-06-17 NOTE — THERAPY
Physical Therapy   Daily Treatment     Patient Name: Latanya Ferrera  Age:  58 y.o., Sex:  female  Medical Record #: [id number]  Today's Date: 6/17/2024     Precautions  Precautions: Fall Risk  Comments: R hemiplegia    Subjective    Patient reports she is confident with discharge plan set for Thursday     Objective       06/17/24 0831   PT Charge Group   PT Therapeutic Exercise (Units) 1   PT Therapeutic Activities (Units) 1   PT Total Time Spent   PT Individual Total Time Spent (Mins) 30   Precautions   Precautions Fall Risk   Comments R hemiplegia   Transfer Functional Level of Assist   Bed, Chair, Wheelchair Transfer Standby Assist   Bed Chair Wheelchair Transfer Description Set-up of equipment;Verbal cueing   Sitting Lower Body Exercises   Sitting Lower Body Exercises   (instruction to transition to HEP)   Ankle Pumps 1 set of 10;Bilateral   Hip Abduction 1 set of 10;Bilateral   Hip Adduction 1 set of 10;Bilateral   Long Arc Quad 1 set of 10;Bilateral   Marching 1 set of 10;Reciprocal   Hamstring Curl 1 set of 10;Bilateral   Interdisciplinary Plan of Care Collaboration   IDT Collaboration with  Physical Therapist   Collaboration Comments treatment planning     Patient was toileting with nursing at beginning of session    Assessment    Able to complete one set of seated there-ex to transition to HEP    Strengths: Able to follow instructions, Alert and oriented, Independent prior level of function, Motivated for self care and independence, Pleasant and cooperative, Supportive family, Willingly participates in therapeutic activities  Barriers: Decreased endurance, Difficulty following instructions, Fatigue, Hemiplegia, Home accessibility, Hypertension, Impaired balance, Impaired carryover of learning, Limited mobility (lives alone, decreased knowledge of condition, limited daytime family support)    Plan    Floor recovery  Continue gait and pregait without UE support vs FWW vs cane and FES as tolerated   AFO trial:  "Xtern vs posterior DF assist   TM vs OG gait training c/ metronome for rhythm/rita  Pregait to gait with dec UE support- stepping and weight acceptance/stance stability  Push/pulls c/ resistance   Ankle extrinsic strength and coordination, hams and hip ext strength (pulling, hip hinge, deadlift patterns)  Functional endurance and OOB time   Repeat FT c/ daughter prior to d/c      DME  PT DME Recommendations  Wheelchair: 18\" Width, Jaciel Height (16\"-17\"), Lightweight, Removable/Flip Back Armrests, Standard Leg Rests, Anti-tippers  Cushion: Standard  Assistive Device: Front Wheeled Walker  Additional Equipment: Other (Comments) (R AFO)     Passport items to be completed:  Get in/out of bed safely, in/out of a vehicle, safely use mobility device, walk or wheel around home/community, navigate up and down stairs, show how to get up/down from the ground, ensure home is accessible, demonstrate HEP, complete caregiver training    Physical Therapy Problems (Active)       Problem: PT-Long Term Goals       Dates: Start:  05/30/24         Goal: LTG-By discharge, patient will transfer one surface to another c/ Boni.        Dates: Start:  05/30/24            Goal: LTG-By discharge, patient will ambulate up/down 2 stairs c/ CGA or better to access home.        Dates: Start:  05/30/24              "

## 2024-06-18 ENCOUNTER — APPOINTMENT (OUTPATIENT)
Dept: OCCUPATIONAL THERAPY | Facility: REHABILITATION | Age: 59
DRG: 057 | End: 2024-06-18
Attending: PHYSICAL MEDICINE & REHABILITATION
Payer: MEDICAID

## 2024-06-18 ENCOUNTER — APPOINTMENT (OUTPATIENT)
Dept: PHYSICAL THERAPY | Facility: REHABILITATION | Age: 59
DRG: 057 | End: 2024-06-18
Attending: PHYSICAL MEDICINE & REHABILITATION
Payer: MEDICAID

## 2024-06-18 ENCOUNTER — APPOINTMENT (OUTPATIENT)
Dept: RADIOLOGY | Facility: REHABILITATION | Age: 59
DRG: 057 | End: 2024-06-18
Attending: PHYSICAL MEDICINE & REHABILITATION
Payer: MEDICAID

## 2024-06-18 LAB
ANION GAP SERPL CALC-SCNC: 14 MMOL/L (ref 7–16)
APPEARANCE UR: CLEAR
BACTERIA #/AREA URNS HPF: NEGATIVE /HPF
BASOPHILS # BLD AUTO: 0.3 % (ref 0–1.8)
BASOPHILS # BLD: 0.04 K/UL (ref 0–0.12)
BILIRUB UR QL STRIP.AUTO: NEGATIVE
BUN SERPL-MCNC: 15 MG/DL (ref 8–22)
CALCIUM SERPL-MCNC: 9.4 MG/DL (ref 8.5–10.5)
CHLORIDE SERPL-SCNC: 106 MMOL/L (ref 96–112)
CO2 SERPL-SCNC: 22 MMOL/L (ref 20–33)
COLOR UR: YELLOW
CREAT SERPL-MCNC: 0.57 MG/DL (ref 0.5–1.4)
EOSINOPHIL # BLD AUTO: 0.48 K/UL (ref 0–0.51)
EOSINOPHIL NFR BLD: 4 % (ref 0–6.9)
EPI CELLS #/AREA URNS HPF: NEGATIVE /HPF
ERYTHROCYTE [DISTWIDTH] IN BLOOD BY AUTOMATED COUNT: 44.2 FL (ref 35.9–50)
GFR SERPLBLD CREATININE-BSD FMLA CKD-EPI: 106 ML/MIN/1.73 M 2
GLUCOSE SERPL-MCNC: 99 MG/DL (ref 65–99)
GLUCOSE UR STRIP.AUTO-MCNC: NEGATIVE MG/DL
HCT VFR BLD AUTO: 33.8 % (ref 37–47)
HGB BLD-MCNC: 10.6 G/DL (ref 12–16)
HYALINE CASTS #/AREA URNS LPF: ABNORMAL /LPF
IMM GRANULOCYTES # BLD AUTO: 0.14 K/UL (ref 0–0.11)
IMM GRANULOCYTES NFR BLD AUTO: 1.2 % (ref 0–0.9)
KETONES UR STRIP.AUTO-MCNC: NEGATIVE MG/DL
LEUKOCYTE ESTERASE UR QL STRIP.AUTO: ABNORMAL
LYMPHOCYTES # BLD AUTO: 1.44 K/UL (ref 1–4.8)
LYMPHOCYTES NFR BLD: 12.1 % (ref 22–41)
MCH RBC QN AUTO: 29.1 PG (ref 27–33)
MCHC RBC AUTO-ENTMCNC: 31.4 G/DL (ref 32.2–35.5)
MCV RBC AUTO: 92.9 FL (ref 81.4–97.8)
MICRO URNS: ABNORMAL
MONOCYTES # BLD AUTO: 0.82 K/UL (ref 0–0.85)
MONOCYTES NFR BLD AUTO: 6.9 % (ref 0–13.4)
NEUTROPHILS # BLD AUTO: 8.97 K/UL (ref 1.82–7.42)
NEUTROPHILS NFR BLD: 75.5 % (ref 44–72)
NITRITE UR QL STRIP.AUTO: NEGATIVE
NRBC # BLD AUTO: 0 K/UL
NRBC BLD-RTO: 0 /100 WBC (ref 0–0.2)
PH UR STRIP.AUTO: 5.5 [PH] (ref 5–8)
PLATELET # BLD AUTO: 289 K/UL (ref 164–446)
PMV BLD AUTO: 10.8 FL (ref 9–12.9)
POTASSIUM SERPL-SCNC: 4 MMOL/L (ref 3.6–5.5)
PROT UR QL STRIP: NEGATIVE MG/DL
RBC # BLD AUTO: 3.64 M/UL (ref 4.2–5.4)
RBC # URNS HPF: ABNORMAL /HPF
RBC UR QL AUTO: NEGATIVE
SODIUM SERPL-SCNC: 142 MMOL/L (ref 135–145)
SP GR UR STRIP.AUTO: 1.02
UROBILINOGEN UR STRIP.AUTO-MCNC: 0.2 MG/DL
WBC # BLD AUTO: 11.9 K/UL (ref 4.8–10.8)
WBC #/AREA URNS HPF: ABNORMAL /HPF

## 2024-06-18 PROCEDURE — 97110 THERAPEUTIC EXERCISES: CPT

## 2024-06-18 PROCEDURE — 770010 HCHG ROOM/CARE - REHAB SEMI PRIVAT*

## 2024-06-18 PROCEDURE — 85025 COMPLETE CBC W/AUTO DIFF WBC: CPT

## 2024-06-18 PROCEDURE — 81001 URINALYSIS AUTO W/SCOPE: CPT

## 2024-06-18 PROCEDURE — 36415 COLL VENOUS BLD VENIPUNCTURE: CPT

## 2024-06-18 PROCEDURE — 97116 GAIT TRAINING THERAPY: CPT

## 2024-06-18 PROCEDURE — 80048 BASIC METABOLIC PNL TOTAL CA: CPT

## 2024-06-18 PROCEDURE — A9270 NON-COVERED ITEM OR SERVICE: HCPCS | Performed by: PHYSICAL MEDICINE & REHABILITATION

## 2024-06-18 PROCEDURE — 71045 X-RAY EXAM CHEST 1 VIEW: CPT

## 2024-06-18 PROCEDURE — 700111 HCHG RX REV CODE 636 W/ 250 OVERRIDE (IP): Mod: JZ | Performed by: PHYSICAL MEDICINE & REHABILITATION

## 2024-06-18 PROCEDURE — 97112 NEUROMUSCULAR REEDUCATION: CPT

## 2024-06-18 PROCEDURE — 700102 HCHG RX REV CODE 250 W/ 637 OVERRIDE(OP): Performed by: PHYSICAL MEDICINE & REHABILITATION

## 2024-06-18 PROCEDURE — 99232 SBSQ HOSP IP/OBS MODERATE 35: CPT | Performed by: PHYSICAL MEDICINE & REHABILITATION

## 2024-06-18 RX ADMIN — HYDRALAZINE HYDROCHLORIDE 75 MG: 50 TABLET ORAL at 08:42

## 2024-06-18 RX ADMIN — BENZONATATE 100 MG: 100 CAPSULE ORAL at 20:29

## 2024-06-18 RX ADMIN — OMEPRAZOLE 20 MG: 20 CAPSULE, DELAYED RELEASE ORAL at 08:42

## 2024-06-18 RX ADMIN — HYDRALAZINE HYDROCHLORIDE 75 MG: 50 TABLET ORAL at 13:28

## 2024-06-18 RX ADMIN — GABAPENTIN 600 MG: 300 CAPSULE ORAL at 20:28

## 2024-06-18 RX ADMIN — GABAPENTIN 600 MG: 300 CAPSULE ORAL at 08:42

## 2024-06-18 RX ADMIN — AMLODIPINE BESYLATE 10 MG: 5 TABLET ORAL at 05:50

## 2024-06-18 RX ADMIN — OXYCODONE 2.5 MG: 5 TABLET ORAL at 17:13

## 2024-06-18 RX ADMIN — BENZONATATE 100 MG: 100 CAPSULE ORAL at 15:05

## 2024-06-18 RX ADMIN — LISINOPRIL 80 MG: 20 TABLET ORAL at 05:50

## 2024-06-18 RX ADMIN — ENOXAPARIN SODIUM 40 MG: 100 INJECTION SUBCUTANEOUS at 17:11

## 2024-06-18 RX ADMIN — OXYCODONE 2.5 MG: 5 TABLET ORAL at 10:07

## 2024-06-18 RX ADMIN — Medication 5000 UNITS: at 08:41

## 2024-06-18 RX ADMIN — HYDRALAZINE HYDROCHLORIDE 75 MG: 50 TABLET ORAL at 20:28

## 2024-06-18 RX ADMIN — HYDRALAZINE HYDROCHLORIDE 75 MG: 50 TABLET ORAL at 17:13

## 2024-06-18 RX ADMIN — GABAPENTIN 600 MG: 300 CAPSULE ORAL at 15:05

## 2024-06-18 RX ADMIN — BENZONATATE 100 MG: 100 CAPSULE ORAL at 08:42

## 2024-06-18 ASSESSMENT — ACTIVITIES OF DAILY LIVING (ADL)
TOILET_TRANSFER_DESCRIPTION: GRAB BAR;SUPERVISION FOR SAFETY;VERBAL CUEING
BED_CHAIR_WHEELCHAIR_TRANSFER_DESCRIPTION: ADAPTIVE EQUIPMENT;SUPERVISION FOR SAFETY;VERBAL CUEING

## 2024-06-18 ASSESSMENT — GAIT ASSESSMENTS
GAIT LEVEL OF ASSIST: CONTACT GUARD ASSIST
DISTANCE (FEET): 300

## 2024-06-18 NOTE — THERAPY
Occupational Therapy  Daily Treatment     Patient Name: Latanya Ferrera  Age:  58 y.o., Sex:  female  Medical Record #: [id number]  Today's Date: 6/18/2024     Precautions  Precautions: Fall Risk  Comments: R hemiplegia         Subjective  Pt supine in bed upon arrival. Agreeable to some therapy despite denying a second session this morning. However denied therapy after 30 minutes.     Objective       06/18/24 1401   Sitting Upper Body Exercises   Chest Fly 1 set of 10;Bilateral  (w/ pink theraband)   Chest Press 1 set of 10;Bilateral  (w/ pink theraband)   External Shoulder Rotation 1 set of 10;Bilateral  (w/ pink theraband)   Other Exercise Shoulder flexion  (3x10, Bilateral w/ pink theraband)   Interdisciplinary Plan of Care Collaboration   Patient Position at End of Therapy Seated;In Bed;Call Light within Reach;Tray Table within Reach;Phone within Reach     HEP provided for UE theraband exercises. Pt verbalized understanding and demonstrated exercises    Assessment  Pt able to complete BUE theraband exercises w/ good symmetry and quality. R side still fatigues quicker and moves slower but pt was still able to complete all exercises. Denied multiple requests to continue w/ therapy d/t feeling tired and like she didn't need anymore.     Strengths: Able to follow instructions, Good insight into deficits/needs, Independent prior level of function, Manages pain appropriately, Motivated for self care and independence, Pleasant and cooperative, Supportive family, Willingly participates in therapeutic activities  Barriers: Decreased endurance, Hemiparesis, Home accessibility, Impaired activity tolerance, Impaired balance (Impaired coordination/motor control)    Plan  Discharge shower tomorrow      Occupational Therapy Goals (Active)       Problem: OT Long Term Goals       Dates: Start:  05/30/24         Goal: LTG-By discharge, patient will complete basic self care tasks with SPV-MOD I and LRAD       Dates: Start:  05/30/24             Goal: LTG-By discharge, patient will perform bathroom transfers with SPV-MOD I and LRAD       Dates: Start:  05/30/24

## 2024-06-18 NOTE — THERAPY
"Physical Therapy   Daily Treatment     Patient Name: Latanya Ferrera  Age:  58 y.o., Sex:  female  Medical Record #: [id number]  Today's Date: 6/18/2024     Precautions  Precautions: Fall Risk  Comments: R hemiplegia    Subjective    \"I'm ready.\" Pt seated EOB at arrival, agreeable to PT tx.      Objective       06/18/24 0701   PT Charge Group   PT Gait Training (Units) 2   PT Therapeutic Exercise (Units) 2   PT Neuromuscular Re-Education / Balance (Units) 2   PT Total Time Spent   PT Individual Total Time Spent (Mins) 90   Gait Functional Level of Assist    Gait Level Of Assist Contact Guard Assist   Assistive Device None   Distance (Feet) 300   # of Times Distance was Traveled 1  (2 x 150' c/ FWW at Lanie)   Deviation Ataxic;Decreased Heel Strike;Trendelenberg  (overshoot on RLE swing, B hip weakness impacting stance stability)   Transfer Functional Level of Assist   Bed, Chair, Wheelchair Transfer Standby Assist   Bed Chair Wheelchair Transfer Description Adaptive equipment;Supervision for safety;Verbal cueing  (SPT <> w/c)   Toilet Transfers Minimal Assist  (for LOB x 1 c/ STS from toilet)   Toilet Transfer Description Grab bar;Supervision for safety;Verbal cueing   Bed Mobility    Supine to Sit Modified Independent   Sit to Supine Modified Independent   Sit to Stand Supervised  (2/2 impulsivity and occasional LOB)   Scooting Modified Independent   Rolling Modified Independent   Neuro-Muscular Treatments   Neuro-Muscular Treatments Anterior weight shift;Co-Contraction;Postural Changes;Postural Facilitation;Tactile Cuing;Sequencing;Verbal Cuing;Weight Shift Right;Weight Shift Left;Other (See Comments)   Comments see note   Interdisciplinary Plan of Care Collaboration   Patient Position at End of Therapy In Bed;Call Light within Reach;Tray Table within Reach;Phone within Reach   Physical Therapist Assigned   Assigned PT / Treatment Time / Comments keep c/ Christina. 60-90     TE/NMRE/Pregait-Gait training:   Marce medina " "c/ tactile cues for R hip adduction and pronation for stance stability  Hooklying hip adduction isometrics on ball 1 x 10 @ 3\" holds, then c/ feet on footplate 1 x 10 @ 3\" holds  Hip ADD isometric c/ ball + leg press BLE, 3 x 10, 4/4/5 bands  Single leg press RLE c/ cues at foot via purple TB for pronation to maintain LE alignment: 4 x 6 @ 4 bands   STS without UE support, RLE bias   1 x 6 @ 18\"H chair then 4 x 6 @ 21\"H chair c/ 6# med ball in BUE  1 x 6 c/ touchdown only to work neuromotor control at bottom of range   Pregait/gait  Stance stability c/ multiplane stepping to targets, no UE support, purple TB at R knee to cue knee ext control; mirror for postural feedback  Lateral stepping 3 x 10 each, added 6# med ball in BUE during efforts 2-3  A-P stepping 3 x 10 each, added 6# med ball in BUE during efforts 2-3  Forward-backward walking c/ 8# med ball BUE, cues for step symmetry 6 x 10' each direction  Gait no AD, level surface, R AFO (posterior assist) donned   Bounding 1 x 40'  Fast walking 7 x 50'   Transitional gait btw therapy activities c/ FWW and R AFO, Lanie.   Time also spent for toilet transfer. Pt c/ LOB to L side when attempting to rise via grabbing FWW, Sonia to recover.     Assessment  Latanya benefits from tactile cues via therapy ball and theraband for RLE alignment during closed kinetic chain activities, and axial loading via weighted balls to facilitate trunk control. Continues c/ good tolerance to posterior assist AFO, but continues to demo overshoot on RLE swing with poor anterior WS impacting initial contact and weight acceptance on RLE.     Strengths: Able to follow instructions, Alert and oriented, Independent prior level of function, Motivated for self care and independence, Pleasant and cooperative, Supportive family, Willingly participates in therapeutic activities  Barriers: Decreased endurance, Difficulty following instructions, Fatigue, Hemiplegia, Home accessibility, Hypertension, " "Impaired balance, Impaired carryover of learning, Limited mobility (lives alone, decreased knowledge of condition, limited daytime family support)    Plan  DC IRF-Kenn, review floor recovery  R AFO ordered   FT day of d/c for car transfers, floor transfers, home safety, gait and stairs c/ FWW and AFO   Continue stance stability training, hip abd, hams and gluts, hip extension, ankle extrinsics (heel raises)  HEP: standing balance and stepping, LE strength: STS, modified SL balance, heel raises, stepping all directions, bridges     DME  PT DME Recommendations  Wheelchair: 18\" Width, Jaciel Height (16\"-17\"), Lightweight, Removable/Flip Back Armrests, Standard Leg Rests, Anti-tippers  Cushion: Standard  Assistive Device: Front Wheeled Walker  Additional Equipment: Other (Comments) (R AFO)    Passport items to be completed:  Get in/out of bed safely, in/out of a vehicle, safely use mobility device, walk or wheel around home/community, navigate up and down stairs, show how to get up/down from the ground, ensure home is accessible, demonstrate HEP, complete caregiver training    Physical Therapy Problems (Active)       Problem: PT-Long Term Goals       Dates: Start:  05/30/24         Goal: LTG-By discharge, patient will transfer one surface to another c/ Boni.        Dates: Start:  05/30/24            Goal: LTG-By discharge, patient will ambulate up/down 2 stairs c/ CGA or better to access home.        Dates: Start:  05/30/24              "

## 2024-06-18 NOTE — THERAPY
Occupational Therapy  Daily Treatment     Patient Name: Latanya Ferrera  Age:  58 y.o., Sex:  female  Medical Record #: [id number]  Today's Date: 6/18/2024     Precautions  Precautions: Fall Risk  Comments: R hemiplegia         Subjective  Pt supine in bed upon arrival. Pleasant and agreeable to morning therapy. Stated she is denying afternoon therapy session because she has already done everything.    Objective       06/18/24 0931   Functional Level of Assist   Bed, Chair, Wheelchair Transfer Supervised   Bed Chair Wheelchair Transfer Description   (bed<>w/c stand-pivot)   Bed Mobility    Supine to Sit Modified Independent   Sit to Supine Modified Independent   Interdisciplinary Plan of Care Collaboration   Patient Position at End of Therapy In Bed;Call Light within Reach;Tray Table within Reach;Phone within Reach     Pt participated in UB stretching routine @ w/c level. Completed 1x10 repetitions of each:  -Overhead claps  -Forward punches  -Trunk twists  -Wrist circles (both directions)  -Internal/external rotation  -Shoulder elevation/depression  Although pt denied upcoming afternoon session, she was reminded that therapy will still come to see if she changed her mind and to provide a thera-band exercise handout for UB strengthening.    Assessment  Pt doing well overall and feeling ready to go home. Demo good ROM and symmetry during all UE stretching exercises. R side still fatigues quicker and moves slower, but pt was still able to complete all exercises w/ good form. Able to demo good safety awareness w/o any verbal cues by locking and unlocking w/c brakes appropriately, positioning w/c appropriately, and donning footwear before getting out of bed.    Strengths: Able to follow instructions, Good insight into deficits/needs, Independent prior level of function, Manages pain appropriately, Motivated for self care and independence, Pleasant and cooperative, Supportive family, Willingly participates in therapeutic  activities  Barriers: Decreased endurance, Hemiparesis, Home accessibility, Impaired activity tolerance, Impaired balance (Impaired coordination/motor control)    Plan  Go over HEP for UB thera-band exercises  Discharge shower tomorrow        Occupational Therapy Goals (Active)       Problem: OT Long Term Goals       Dates: Start:  05/30/24         Goal: LTG-By discharge, patient will complete basic self care tasks with SPV-MOD I and LRAD       Dates: Start:  05/30/24            Goal: LTG-By discharge, patient will perform bathroom transfers with SPV-MOD I and LRAD       Dates: Start:  05/30/24

## 2024-06-18 NOTE — PROGRESS NOTES
"  Physical Medicine & Rehabilitation Progress Note    Encounter Date: 6/18/2024    Chief Complaint: Walked today    Interval Events (Subjective):  LAURA ALVAREZ 6/17  Voiding     Seen and examined in her room with CNA. She is doing well. She needs a letter for [employer]. She walked today and that felt really good. She was walking faster than she has before and really thinks the extra days have helped. She would like to know which anti-hypertensive medications she is on.        ROS: 14 point ROS negative unless otherwise specified in the HPI    Objective:  VITAL SIGNS: /69   Pulse 80   Temp 37.2 °C (99 °F) (Oral)   Resp 18   Ht 1.676 m (5' 6\")   Wt 81.6 kg (180 lb)   SpO2 98%   BMI 29.05 kg/m²     GEN: No apparent distress  HEENT: Head normocephalic, atraumatic.  Sclera nonicteric bilaterally, no ocular discharge appreciated bilaterally.  CV: Extremities warm and well-perfused, no peripheral edema appreciated bilaterally.  PULMONARY: Breathing nonlabored on room air, no respiratory accessory muscle use.  Not requiring supplemental oxygen.  SKIN: No appreciable skin breakdown on exposed areas of skin.  PSYCH: Mood and affect within normal limits.  NEURO: Awake alert.  Conversational.  Logical thought content. Improving R hemiparesis.       Laboratory Values:  Recent Results (from the past 72 hour(s))   CBC WITH DIFFERENTIAL    Collection Time: 06/18/24  5:20 AM   Result Value Ref Range    WBC 11.9 (H) 4.8 - 10.8 K/uL    RBC 3.64 (L) 4.20 - 5.40 M/uL    Hemoglobin 10.6 (L) 12.0 - 16.0 g/dL    Hematocrit 33.8 (L) 37.0 - 47.0 %    MCV 92.9 81.4 - 97.8 fL    MCH 29.1 27.0 - 33.0 pg    MCHC 31.4 (L) 32.2 - 35.5 g/dL    RDW 44.2 35.9 - 50.0 fL    Platelet Count 289 164 - 446 K/uL    MPV 10.8 9.0 - 12.9 fL    Neutrophils-Polys 75.50 (H) 44.00 - 72.00 %    Lymphocytes 12.10 (L) 22.00 - 41.00 %    Monocytes 6.90 0.00 - 13.40 %    Eosinophils 4.00 0.00 - 6.90 %    Basophils 0.30 0.00 - 1.80 %    Immature Granulocytes 1.20 " (H) 0.00 - 0.90 %    Nucleated RBC 0.00 0.00 - 0.20 /100 WBC    Neutrophils (Absolute) 8.97 (H) 1.82 - 7.42 K/uL    Lymphs (Absolute) 1.44 1.00 - 4.80 K/uL    Monos (Absolute) 0.82 0.00 - 0.85 K/uL    Eos (Absolute) 0.48 0.00 - 0.51 K/uL    Baso (Absolute) 0.04 0.00 - 0.12 K/uL    Immature Granulocytes (abs) 0.14 (H) 0.00 - 0.11 K/uL    NRBC (Absolute) 0.00 K/uL   Basic Metabolic Panel    Collection Time: 06/18/24  5:20 AM   Result Value Ref Range    Sodium 142 135 - 145 mmol/L    Potassium 4.0 3.6 - 5.5 mmol/L    Chloride 106 96 - 112 mmol/L    Co2 22 20 - 33 mmol/L    Glucose 99 65 - 99 mg/dL    Bun 15 8 - 22 mg/dL    Creatinine 0.57 0.50 - 1.40 mg/dL    Calcium 9.4 8.5 - 10.5 mg/dL    Anion Gap 14.0 7.0 - 16.0   ESTIMATED GFR    Collection Time: 06/18/24  5:20 AM   Result Value Ref Range    GFR (CKD-EPI) 106 >60 mL/min/1.73 m 2         Medications:  Scheduled Medications   Medication Dose Frequency    gabapentin  600 mg TID    lisinopril  80 mg Q DAY    hydrALAZINE  75 mg 4X/DAY    benzonatate  100 mg TID    vitamin D3  5,000 Units DAILY    enoxaparin (LOVENOX) injection  40 mg DAILY AT 1800    Pharmacy Consult Request  1 Each PHARMACY TO DOSE    omeprazole  20 mg DAILY    amLODIPine  10 mg Q DAY     PRN medications: docusate sodium, senna-docusate **AND** polyethylene glycol/lytes, oxyCODONE immediate-release, Respiratory Therapy Consult, hydrALAZINE, bisacodyl, magnesium hydroxide, acetaminophen, carboxymethylcellulose, benzocaine-menthol, mag hydrox-al hydrox-simeth, ondansetron **OR** ondansetron, traZODone, sodium chloride, ipratropium-albuterol, melatonin    Diet:  Current Diet Order   Procedures    Diet Order Diet: Regular     [facility] 5/24/24 [facility] 6/5/24 [facility]      Study is compared to prior from 5/28/2024. Since the previous study the left basal ganglia/thalamic hemorrhage has decreased in size. Previously was 1.7 x 1.3 cm and currently 1.2 x 1.0 cm. Surrounding vasogenic edema is similar.        Medical Decision Making and Plan:  Left Basal Ganglia ICH secondary to hypertensive emergency  Right Hemiparesis  PT and OT for mobility and ADLs. Per guidelines, 15 hours per week between PT, OT and/or SLP.  Follow-up Neurology (Dr. Babb)  Secondary stroke ppx: Anti-hypertensives    6/12 BP finally controlled and < 140 SBP    FACE TO FACE: Patient is wheelchair confined when out of bed. Requires manual wheelchair to meet mobility needs. Patient has been trained on use and demonstrated ability to use. Have verified ability to use within the home.     FACE TO FACE: Right AFO needed due to right hemiparesis and right foot drop.    Headache  Neck Pain  6/3 VSS. No other symptoms  6/4 Resolved. Tylenol relieved her symptoms. No change in neuro status.   6/5 Continues to have headache which returned last night severely. Recently started on DVT ppx. STAT CTH to ensure no expansion of bleed. CTH showing subacute ICH. D/w rads directly 6/5/2024 3:05 PM - not concerned for it being acute, looks older, but will get [facility] for formal comparison. Hold Lovenox for now to error on side of caution, recent US BLE neg for DVT. Restarted on Lovenox without issue.   6/6 Oxycodone 2.5mg daily PRN headache  Resolved      Hypertension  Continue Hydralazine 50mg q6 hours --> 6/5 75mg every 6 hours  Continue Lisinopril 40mg daily --> 6/7 60mg daily --> 6/10 Increase to 80 mg daily   Continue Amlodipine 10mg daily   6/18 BP controlled      Leukocytosis  Improving on admission but unclear cause  Continue Rocephin x 2 doses (through 5/31) - ok to d/c IV after  6/14 stable 10.8 - stays in 10's  6/18 Mild leukocytosis. Check UA and CXR.      Urinary Retention  Has otero - required CIC   Resolved. Off of flomax    Chronic Cough  Secondary to years of smoking. D/w patient and daughter, nothing has changed in terms of her cough, nothing new or concerning to them.   Tessalon perls scheduled TID    Hyperphosphatemia  6/3 Resolved, Ph normal      Anemia   Stable in 10's     Neuropathic Pain  6/3 Continue Gabapentin - increase to 400mg TID -->  600mg TID     Bowel    Having regular bowel movements     Upcoming Labs/imagin/19     DVT PROPHYLAXIS: None - Hemorrhagic stroke. Check US - negative. Per Neuro note, clear for DVT chemo-ppx once MRI completed (completed ). Lovenox 40 mg SQ started 6/3. Hold Lovenox 2024 until can formally compare CTH but per d/w radiologist directly low c/f acute bleed as appears older. Reviewed personally and no acute finding on new CTH.  Restart Lovenox ppx.      HOSPITALIST FOLLOWING: No     CODE STATUS: FULL CODE     DISPO: Home with minimal support     VIANCA: 24 - Requesting extension as patient making really good progress     MEDS SENT TO: [facility]     DISCHARGE SPECIALIST FOLLOW UP: Neurology     Patient to scheduled follow up with their PCP within 2 weeks from discharge from the [facility].     Dr. Sofi Jules DO, MS  Department of Physical Medicine & Rehabilitation

## 2024-06-18 NOTE — DISCHARGE PLANNING
CM sent referrals to [facility] and [facility].  Awaiting response.      [facility] declined.  CM sent referral to [facility].

## 2024-06-18 NOTE — PROGRESS NOTES
..                                                         NURSING DAILY NOTE    Name: Latanya Ferrera   Date of Admission: 5/29/2024   Admitting Diagnosis: Hemorrhagic stroke (HCC)  Attending Physician: Sofi Jules D.o.  Allergies: Pineapple    Safety  Patient Assist  SBA  Patient Precautions  Fall Risk  Precaution Comments  R hemiplegia  Bed Transfer Status  Supervised  Toilet Transfer Status   Standby Assist  Assistive Devices  Rails, Wheelchair  Oxygen  None - Room Air  Diet/Therapeutic Dining  Current Diet Order   Procedures    Diet Order Diet: Regular     Pill Administration  whole  Agitated Behavioral Scale     ABS Level of Severity       Fall Risk  Has the patient had a fall this admission?   No  Carly Fisehr Fall Risk Scoring  10, LOW RISK  Fall Risk Safety Measures  bed alarm and chair alarm    Vitals  Temperature: 36.6 °C (97.8 °F)  Temp src: Oral  Pulse: 79  Respiration: 18  Blood Pressure: 128/68  Blood Pressure MAP (Calculated): 88 MM HG  BP Location: Left, Upper Arm  Patient BP Position: Supine     Oxygen  Pulse Oximetry: 98 %  O2 (LPM): 0  O2 Delivery Device: None - Room Air    Bowel and Bladder  Last Bowel Movement  06/17/24  Stool Type  Type 4: Like a sausage or snake, smooth and soft  Bowel Device  Bathroom  Continent  Bladder: Did not void   Bowel: No movement  Bladder Function  Urine Void (mL):  (large)  Number of Times Voided: 1  Urinary Options: Yes  Urine Color: Yellow  Genitourinary Assessment   Bladder Assessment (WDL):  Within Defined Limits  Ramirez Catheter: Not Applicable  Ramirez Reasons per MD Order: Acute urinary retention or bladder outlet obstruction  Ramirez Care: Given with Soap and Water  Urinary Elimination: Catheter (Document on LDA)  Urine Color: Yellow  Bladder Device: Bathroom  Bladder Scan: Post Void  $ Bladder Scan Results (mL): 100  Bladder Medications: Yes    Skin  Xavier Score   18  Sensory Interventions   Bed Types: Standard/Trauma Mattress  Skin Preventative Measures:  Waffle Overlay  Moisture Interventions  Moisturizers/Barriers: Barrier Cream  Containment Devices: Indwelling Catheter      Pain  Pain Rating Scale  6 - Hard to ignore, avoid usual activities  Pain Location  Leg  Pain Location Orientation  Right  Pain Interventions   Declines    ADLs    Bathing   Shower, * * With Assistance from, Staff  Linen Change   Complete  Personal Hygiene     Chlorhexidine Bath      Oral Care  Brushed Teeth  Teeth/Dentures  Missing Teeth (Comments)  Shave     Nutrition Percentage Eaten  *  * Meal *  *, Between 50-75% Consumed  Environmental Precautions  Treaded Slipper Socks on Patient, Bed in Low Position  Patient Turns/Positioning  Patient Turns Self from Side to Side  Patient Turns Assistance/Tolerance  Assistance of One  Bed Positions  Bed Locked, Bed Controls On  Head of Bed Elevated  Self regulated      Psychosocial/Neurologic Assessment  Psychosocial Assessment  Psychosocial (WDL):  Within Defined Limits  Patient Behaviors: Fatigue  Family Behaviors: No Family Present  Neurologic Assessment  Neuro (WDL): Exceptions to WDL  Level of Consciousness: Alert  Orientation Level: Oriented X4  Cognition: Follows commands, Appropriate attention/concentration  Speech: Clear  Pupil Assesment: No  R Pupil Size (mm): 3  R Pupil Shape / Description: Round  R Pupil Reaction: Brisk  L Pupil Size (mm): 3  L Pupil Shape / Description: Round  L Pupil Reaction: Brisk  Motor Function/Sensation Assessment: Sensation, Motor strength  RUE Sensation: Numbness  Muscle Strength Right Arm: Fair Strength against Gravity but No Resistance  LUE Sensation: Full sensation  Muscle Strength Left Arm: Normal Strength Against Gravity and Full Resistance  RLE Sensation: Numbness  Muscle Strength Right Leg: Fair Strength against Gravity but No Resistance  LLE Sensation: Full sensation  Muscle Strength Left Leg: Normal Strength Against Gravity and Full Resistance  EENT (WDL):  WDL Except    Cardio/Pulmonary Assessment  Edema    RLE Edema: Trace  LLE Edema: Trace  Respiratory Breath Sounds  RUL Breath Sounds: Clear  RML Breath Sounds: Clear  RLL Breath Sounds: Clear  OZIEL Breath Sounds: Clear  LLL Breath Sounds: Clear  Cardiac Assessment   Cardiac (WDL):  Within Defined Limits (HTN)

## 2024-06-18 NOTE — DISCHARGE PLANNING
Patient asked CM for another letter for drug court as she will have difficulty getting to and from.   requesting.  Patient hopeful she will be given other alternatives like virtual.  CM scanned copy into EMR and provided patient with letter signed by attending.

## 2024-06-18 NOTE — PROGRESS NOTES
NURSING DAILY NOTE    Name: Latanya Ferrera   Date of Admission: 5/29/2024   Admitting Diagnosis: Hemorrhagic stroke (HCC)  Attending Physician: Sofi Jules D.o.  Allergies: Pineapple    Safety  Patient Assist  SBA  Patient Precautions  Fall Risk  Precaution Comments  R hemiplegia  Bed Transfer Status  Supervised  Toilet Transfer Status   Standby Assist  Assistive Devices  Rails, Wheelchair  Oxygen  None - Room Air  Diet/Therapeutic Dining  Current Diet Order   Procedures    Diet Order Diet: Regular     Pill Administration  whole  Agitated Behavioral Scale     ABS Level of Severity       Fall Risk  Has the patient had a fall this admission?   No  Carly Fisher Fall Risk Scoring  15, HIGH RISK  Fall Risk Safety Measures  bed alarm and chair alarm    Vitals  Temperature: 36.6 °C (97.8 °F)  Temp src: Oral  Pulse: 86  Respiration: 18  Blood Pressure: (!) 141/74  Blood Pressure MAP (Calculated): 96 MM HG  BP Location: Left, Upper Arm  Patient BP Position: Supine     Oxygen  Pulse Oximetry: 98 %  O2 (LPM): 0  O2 Delivery Device: None - Room Air    Bowel and Bladder  Last Bowel Movement  06/17/24  Stool Type  Type 4: Like a sausage or snake, smooth and soft  Bowel Device  Bathroom  Continent  Bladder: Did not void   Bowel: No movement  Bladder Function  Urine Void (mL):  (large)  Number of Times Voided: 1  Urinary Options: Yes  Urine Color: Yellow  Genitourinary Assessment   Bladder Assessment (WDL):  Within Defined Limits  Ramirez Catheter: Not Applicable  Ramirez Reasons per MD Order: Acute urinary retention or bladder outlet obstruction  Ramirez Care: Given with Soap and Water  Urinary Elimination: Catheter (Document on LDA)  Urine Color: Yellow  Bladder Device: Bathroom  Bladder Scan: Post Void  $ Bladder Scan Results (mL): 100  Bladder Medications: Yes    Skin  Xavier Score   17  Sensory Interventions   Bed Types: Standard/Trauma Mattress with Overlay  Skin  Preventative Measures: Waffle Overlay  Moisture Interventions  Moisturizers/Barriers: Barrier Cream  Containment Devices: Indwelling Catheter      Pain  Pain Rating Scale  6 - Hard to ignore, avoid usual activities  Pain Location  Leg  Pain Location Orientation  Right  Pain Interventions   Declines    ADLs    Bathing   Shower, * * With Assistance from, Staff  Linen Change   Complete  Personal Hygiene     Chlorhexidine Bath      Oral Care  Brushed Teeth  Teeth/Dentures  Missing Teeth (Comments)  Shave     Nutrition Percentage Eaten  *  * Meal *  *, Between 50-75% Consumed  Environmental Precautions  Treaded Slipper Socks on Patient, Bed in Low Position  Patient Turns/Positioning  Patient Turns Self from Side to Side  Patient Turns Assistance/Tolerance  Assistance of One  Bed Positions  Bed Locked, Bed Controls On  Head of Bed Elevated  Self regulated      Psychosocial/Neurologic Assessment  Psychosocial Assessment  Psychosocial (WDL):  Within Defined Limits  Patient Behaviors: Fatigue  Family Behaviors: No Family Present  Neurologic Assessment  Neuro (WDL): Exceptions to WDL  Level of Consciousness: Alert  Orientation Level: Oriented X4  Cognition: Follows commands, Appropriate attention/concentration  Speech: Clear  Pupil Assesment: No  R Pupil Size (mm): 3  R Pupil Shape / Description: Round  R Pupil Reaction: Brisk  L Pupil Size (mm): 3  L Pupil Shape / Description: Round  L Pupil Reaction: Brisk  Motor Function/Sensation Assessment: Sensation, Motor strength  RUE Sensation: Numbness  Muscle Strength Right Arm: Fair Strength against Gravity but No Resistance  LUE Sensation: Full sensation  Muscle Strength Left Arm: Normal Strength Against Gravity and Full Resistance  RLE Sensation: Numbness  Muscle Strength Right Leg: Fair Strength against Gravity but No Resistance  LLE Sensation: Full sensation  Muscle Strength Left Leg: Normal Strength Against Gravity and Full Resistance  EENT (WDL):  Within Defined  Limits    Cardio/Pulmonary Assessment  Edema   RLE Edema: Trace  LLE Edema: Trace  Respiratory Breath Sounds  RUL Breath Sounds: Clear  RML Breath Sounds: Clear  RLL Breath Sounds: Clear  OZIEL Breath Sounds: Clear  LLL Breath Sounds: Clear  Cardiac Assessment   Cardiac (WDL):  WDL Except

## 2024-06-19 ENCOUNTER — APPOINTMENT (OUTPATIENT)
Dept: PHYSICAL THERAPY | Facility: REHABILITATION | Age: 59
DRG: 057 | End: 2024-06-19
Attending: PHYSICAL MEDICINE & REHABILITATION
Payer: MEDICAID

## 2024-06-19 ENCOUNTER — APPOINTMENT (OUTPATIENT)
Dept: OCCUPATIONAL THERAPY | Facility: REHABILITATION | Age: 59
DRG: 057 | End: 2024-06-19
Attending: PHYSICAL MEDICINE & REHABILITATION
Payer: MEDICAID

## 2024-06-19 PROBLEM — R33.9 URINARY RETENTION: Status: RESOLVED | Noted: 2024-05-29 | Resolved: 2024-06-19

## 2024-06-19 LAB
BASOPHILS # BLD AUTO: 0.4 % (ref 0–1.8)
BASOPHILS # BLD: 0.04 K/UL (ref 0–0.12)
EOSINOPHIL # BLD AUTO: 0.43 K/UL (ref 0–0.51)
EOSINOPHIL NFR BLD: 4.2 % (ref 0–6.9)
ERYTHROCYTE [DISTWIDTH] IN BLOOD BY AUTOMATED COUNT: 44.2 FL (ref 35.9–50)
HCT VFR BLD AUTO: 33.3 % (ref 37–47)
HGB BLD-MCNC: 10.2 G/DL (ref 12–16)
IMM GRANULOCYTES # BLD AUTO: 0.13 K/UL (ref 0–0.11)
IMM GRANULOCYTES NFR BLD AUTO: 1.3 % (ref 0–0.9)
LYMPHOCYTES # BLD AUTO: 1.36 K/UL (ref 1–4.8)
LYMPHOCYTES NFR BLD: 13.3 % (ref 22–41)
MCH RBC QN AUTO: 29.1 PG (ref 27–33)
MCHC RBC AUTO-ENTMCNC: 30.6 G/DL (ref 32.2–35.5)
MCV RBC AUTO: 94.9 FL (ref 81.4–97.8)
MONOCYTES # BLD AUTO: 0.76 K/UL (ref 0–0.85)
MONOCYTES NFR BLD AUTO: 7.4 % (ref 0–13.4)
NEUTROPHILS # BLD AUTO: 7.52 K/UL (ref 1.82–7.42)
NEUTROPHILS NFR BLD: 73.4 % (ref 44–72)
NRBC # BLD AUTO: 0 K/UL
NRBC BLD-RTO: 0 /100 WBC (ref 0–0.2)
PLATELET # BLD AUTO: 280 K/UL (ref 164–446)
PMV BLD AUTO: 10.4 FL (ref 9–12.9)
PROCALCITONIN SERPL-MCNC: 0.08 NG/ML
RBC # BLD AUTO: 3.51 M/UL (ref 4.2–5.4)
WBC # BLD AUTO: 10.2 K/UL (ref 4.8–10.8)

## 2024-06-19 PROCEDURE — 99232 SBSQ HOSP IP/OBS MODERATE 35: CPT | Performed by: PHYSICAL MEDICINE & REHABILITATION

## 2024-06-19 PROCEDURE — 97530 THERAPEUTIC ACTIVITIES: CPT

## 2024-06-19 PROCEDURE — 84145 PROCALCITONIN (PCT): CPT

## 2024-06-19 PROCEDURE — 97110 THERAPEUTIC EXERCISES: CPT

## 2024-06-19 PROCEDURE — 97116 GAIT TRAINING THERAPY: CPT

## 2024-06-19 PROCEDURE — 36415 COLL VENOUS BLD VENIPUNCTURE: CPT

## 2024-06-19 PROCEDURE — 85025 COMPLETE CBC W/AUTO DIFF WBC: CPT

## 2024-06-19 PROCEDURE — 700102 HCHG RX REV CODE 250 W/ 637 OVERRIDE(OP): Performed by: PHYSICAL MEDICINE & REHABILITATION

## 2024-06-19 PROCEDURE — A9270 NON-COVERED ITEM OR SERVICE: HCPCS | Performed by: PHYSICAL MEDICINE & REHABILITATION

## 2024-06-19 PROCEDURE — 97535 SELF CARE MNGMENT TRAINING: CPT

## 2024-06-19 PROCEDURE — 770010 HCHG ROOM/CARE - REHAB SEMI PRIVAT*

## 2024-06-19 RX ORDER — BENZONATATE 100 MG/1
100 CAPSULE ORAL 3 TIMES DAILY
Qty: 90 CAPSULE | Refills: 2 | Status: SHIPPED | OUTPATIENT
Start: 2024-06-19

## 2024-06-19 RX ORDER — AMLODIPINE BESYLATE 10 MG/1
10 TABLET ORAL DAILY
Qty: 30 TABLET | Refills: 2 | Status: SHIPPED | OUTPATIENT
Start: 2024-06-20

## 2024-06-19 RX ORDER — HYDRALAZINE HYDROCHLORIDE 25 MG/1
75 TABLET, FILM COATED ORAL 4 TIMES DAILY
Qty: 360 TABLET | Refills: 2 | Status: SHIPPED | OUTPATIENT
Start: 2024-06-19

## 2024-06-19 RX ORDER — LISINOPRIL 40 MG/1
80 TABLET ORAL DAILY
Qty: 60 TABLET | Refills: 2 | Status: SHIPPED | OUTPATIENT
Start: 2024-06-20

## 2024-06-19 RX ORDER — GABAPENTIN 400 MG/1
800 CAPSULE ORAL 3 TIMES DAILY
Qty: 180 CAPSULE | Refills: 2 | Status: SHIPPED | OUTPATIENT
Start: 2024-06-19

## 2024-06-19 RX ORDER — GABAPENTIN 400 MG/1
800 CAPSULE ORAL 3 TIMES DAILY
Status: DISCONTINUED | OUTPATIENT
Start: 2024-06-19 | End: 2024-06-20 | Stop reason: HOSPADM

## 2024-06-19 RX ADMIN — ACETAMINOPHEN 500 MG: 500 TABLET, FILM COATED ORAL at 14:47

## 2024-06-19 RX ADMIN — AMLODIPINE BESYLATE 10 MG: 5 TABLET ORAL at 05:21

## 2024-06-19 RX ADMIN — OMEPRAZOLE 20 MG: 20 CAPSULE, DELAYED RELEASE ORAL at 07:43

## 2024-06-19 RX ADMIN — GABAPENTIN 800 MG: 400 CAPSULE ORAL at 14:44

## 2024-06-19 RX ADMIN — OXYCODONE 2.5 MG: 5 TABLET ORAL at 16:53

## 2024-06-19 RX ADMIN — BENZONATATE 100 MG: 100 CAPSULE ORAL at 14:44

## 2024-06-19 RX ADMIN — Medication 5000 UNITS: at 07:42

## 2024-06-19 RX ADMIN — ACETAMINOPHEN 500 MG: 500 TABLET, FILM COATED ORAL at 01:19

## 2024-06-19 RX ADMIN — GABAPENTIN 600 MG: 300 CAPSULE ORAL at 07:43

## 2024-06-19 RX ADMIN — LISINOPRIL 80 MG: 20 TABLET ORAL at 05:21

## 2024-06-19 RX ADMIN — OXYCODONE 2.5 MG: 5 TABLET ORAL at 01:20

## 2024-06-19 RX ADMIN — BENZONATATE 100 MG: 100 CAPSULE ORAL at 19:39

## 2024-06-19 RX ADMIN — HYDRALAZINE HYDROCHLORIDE 75 MG: 50 TABLET ORAL at 16:53

## 2024-06-19 RX ADMIN — GABAPENTIN 800 MG: 400 CAPSULE ORAL at 19:39

## 2024-06-19 RX ADMIN — ACETAMINOPHEN 500 MG: 500 TABLET, FILM COATED ORAL at 08:34

## 2024-06-19 RX ADMIN — HYDRALAZINE HYDROCHLORIDE 75 MG: 50 TABLET ORAL at 12:06

## 2024-06-19 RX ADMIN — BENZONATATE 100 MG: 100 CAPSULE ORAL at 07:42

## 2024-06-19 RX ADMIN — HYDRALAZINE HYDROCHLORIDE 75 MG: 50 TABLET ORAL at 19:38

## 2024-06-19 RX ADMIN — ACETAMINOPHEN 500 MG: 500 TABLET, FILM COATED ORAL at 23:52

## 2024-06-19 RX ADMIN — HYDRALAZINE HYDROCHLORIDE 75 MG: 50 TABLET ORAL at 07:43

## 2024-06-19 ASSESSMENT — GAIT ASSESSMENTS
ASSISTIVE DEVICE: FRONT WHEEL WALKER
ASSISTIVE DEVICE: FRONT WHEEL WALKER
GAIT LEVEL OF ASSIST: STANDBY ASSIST
DISTANCE (FEET): 200
GAIT LEVEL OF ASSIST: STANDBY ASSIST
DEVIATION: ATAXIC;OTHER (COMMENT)
DISTANCE (FEET): 250

## 2024-06-19 ASSESSMENT — ACTIVITIES OF DAILY LIVING (ADL)
SHOWER_TRANSFER_LEVEL_OF_ASSIST: ABLE TO COMPLETE SHOWER TRANSFER WITHOUT ASSIST
TOILETING_LEVEL_OF_ASSIST: ABLE TO COMPLETE TOILETING WITHOUT ASSIST
TOILET_TRANSFER_LEVEL_OF_ASSIST: ABLE TO COMPLETE TOILET TRANSFER WITHOUT ASSIST

## 2024-06-19 ASSESSMENT — BRIEF INTERVIEW FOR MENTAL STATUS (BIMS)
ASKED TO RECALL SOCK: YES, NO CUE REQUIRED
ASKED TO RECALL BED: NO, COULD NOT RECALL
BIMS SUMMARY SCORE: 13
WHAT YEAR IS IT: CORRECT
WHAT DAY OF THE WEEK IS IT: CORRECT
ASKED TO RECALL BLUE: YES, NO CUE REQUIRED
WHAT MONTH IS IT: ACCURATE WITHIN 5 DAYS
INITIAL REPETITION OF BED BLUE SOCK - FIRST ATTEMPT: 3

## 2024-06-19 NOTE — PROGRESS NOTES
"  Physical Medicine & Rehabilitation Progress Note    Encounter Date: 6/19/2024    Chief Complaint: Headache    Interval Events (Subjective):  LAURA ALVAREZ 6/18  Voiding     Seen and examined in her room. She is tired and has a headache, but still feels as if she is getting stronger. Would like to rest. Has nerve pain in her right leg which is bothersome. Would like to go up on Gabapentin.       ROS: 14 point ROS negative unless otherwise specified in the HPI    Objective:  VITAL SIGNS: /59   Pulse 77   Temp 36.6 °C (97.9 °F) (Oral)   Resp 18   Ht 1.676 m (5' 6\")   Wt 81.6 kg (180 lb)   SpO2 90%   BMI 29.05 kg/m²     GEN: No apparent distress  HEENT: Head normocephalic, atraumatic.  Sclera nonicteric bilaterally, no ocular discharge appreciated bilaterally.  CV: Extremities warm and well-perfused, no peripheral edema appreciated bilaterally.  PULMONARY: Breathing nonlabored on room air, no respiratory accessory muscle use.  Not requiring supplemental oxygen.  SKIN: No appreciable skin breakdown on exposed areas of skin.  PSYCH: Mood and affect within normal limits.  NEURO: Awake alert.  Conversational.  Logical thought content. Improving R hemiparesis.       Laboratory Values:  Recent Results (from the past 72 hour(s))   CBC WITH DIFFERENTIAL    Collection Time: 06/18/24  5:20 AM   Result Value Ref Range    WBC 11.9 (H) 4.8 - 10.8 K/uL    RBC 3.64 (L) 4.20 - 5.40 M/uL    Hemoglobin 10.6 (L) 12.0 - 16.0 g/dL    Hematocrit 33.8 (L) 37.0 - 47.0 %    MCV 92.9 81.4 - 97.8 fL    MCH 29.1 27.0 - 33.0 pg    MCHC 31.4 (L) 32.2 - 35.5 g/dL    RDW 44.2 35.9 - 50.0 fL    Platelet Count 289 164 - 446 K/uL    MPV 10.8 9.0 - 12.9 fL    Neutrophils-Polys 75.50 (H) 44.00 - 72.00 %    Lymphocytes 12.10 (L) 22.00 - 41.00 %    Monocytes 6.90 0.00 - 13.40 %    Eosinophils 4.00 0.00 - 6.90 %    Basophils 0.30 0.00 - 1.80 %    Immature Granulocytes 1.20 (H) 0.00 - 0.90 %    Nucleated RBC 0.00 0.00 - 0.20 /100 WBC    Neutrophils " (Absolute) 8.97 (H) 1.82 - 7.42 K/uL    Lymphs (Absolute) 1.44 1.00 - 4.80 K/uL    Monos (Absolute) 0.82 0.00 - 0.85 K/uL    Eos (Absolute) 0.48 0.00 - 0.51 K/uL    Baso (Absolute) 0.04 0.00 - 0.12 K/uL    Immature Granulocytes (abs) 0.14 (H) 0.00 - 0.11 K/uL    NRBC (Absolute) 0.00 K/uL   Basic Metabolic Panel    Collection Time: 06/18/24  5:20 AM   Result Value Ref Range    Sodium 142 135 - 145 mmol/L    Potassium 4.0 3.6 - 5.5 mmol/L    Chloride 106 96 - 112 mmol/L    Co2 22 20 - 33 mmol/L    Glucose 99 65 - 99 mg/dL    Bun 15 8 - 22 mg/dL    Creatinine 0.57 0.50 - 1.40 mg/dL    Calcium 9.4 8.5 - 10.5 mg/dL    Anion Gap 14.0 7.0 - 16.0   ESTIMATED GFR    Collection Time: 06/18/24  5:20 AM   Result Value Ref Range    GFR (CKD-EPI) 106 >60 mL/min/1.73 m 2   URINALYSIS    Collection Time: 06/18/24  2:07 PM    Specimen: Urine, Clean Catch   Result Value Ref Range    Color Yellow     Character Clear     Specific Gravity 1.020 <1.035    Ph 5.5 5.0 - 8.0    Glucose Negative Negative mg/dL    Ketones Negative Negative mg/dL    Protein Negative Negative mg/dL    Bilirubin Negative Negative    Urobilinogen, Urine 0.2 Negative    Nitrite Negative Negative    Leukocyte Esterase Trace (A) Negative    Occult Blood Negative Negative    Micro Urine Req Microscopic    URINE MICROSCOPIC (W/UA)    Collection Time: 06/18/24  2:07 PM   Result Value Ref Range    WBC 2-5 /hpf    RBC 2-5 (A) /hpf    Bacteria Negative None /hpf    Epithelial Cells Negative /hpf    Hyaline Cast 0-2 /lpf   CBC WITH DIFFERENTIAL    Collection Time: 06/19/24  5:27 AM   Result Value Ref Range    WBC 10.2 4.8 - 10.8 K/uL    RBC 3.51 (L) 4.20 - 5.40 M/uL    Hemoglobin 10.2 (L) 12.0 - 16.0 g/dL    Hematocrit 33.3 (L) 37.0 - 47.0 %    MCV 94.9 81.4 - 97.8 fL    MCH 29.1 27.0 - 33.0 pg    MCHC 30.6 (L) 32.2 - 35.5 g/dL    RDW 44.2 35.9 - 50.0 fL    Platelet Count 280 164 - 446 K/uL    MPV 10.4 9.0 - 12.9 fL    Neutrophils-Polys 73.40 (H) 44.00 - 72.00 %     Lymphocytes 13.30 (L) 22.00 - 41.00 %    Monocytes 7.40 0.00 - 13.40 %    Eosinophils 4.20 0.00 - 6.90 %    Basophils 0.40 0.00 - 1.80 %    Immature Granulocytes 1.30 (H) 0.00 - 0.90 %    Nucleated RBC 0.00 0.00 - 0.20 /100 WBC    Neutrophils (Absolute) 7.52 (H) 1.82 - 7.42 K/uL    Lymphs (Absolute) 1.36 1.00 - 4.80 K/uL    Monos (Absolute) 0.76 0.00 - 0.85 K/uL    Eos (Absolute) 0.43 0.00 - 0.51 K/uL    Baso (Absolute) 0.04 0.00 - 0.12 K/uL    Immature Granulocytes (abs) 0.13 (H) 0.00 - 0.11 K/uL    NRBC (Absolute) 0.00 K/uL   PROCALCITONIN    Collection Time: 06/19/24  5:27 AM   Result Value Ref Range    Procalcitonin 0.08 <0.25 ng/mL         Medications:  Scheduled Medications   Medication Dose Frequency    gabapentin  600 mg TID    lisinopril  80 mg Q DAY    hydrALAZINE  75 mg 4X/DAY    benzonatate  100 mg TID    vitamin D3  5,000 Units DAILY    enoxaparin (LOVENOX) injection  40 mg DAILY AT 1800    Pharmacy Consult Request  1 Each PHARMACY TO DOSE    omeprazole  20 mg DAILY    amLODIPine  10 mg Q DAY     PRN medications: docusate sodium, senna-docusate **AND** polyethylene glycol/lytes, oxyCODONE immediate-release, Respiratory Therapy Consult, hydrALAZINE, bisacodyl, magnesium hydroxide, acetaminophen, carboxymethylcellulose, benzocaine-menthol, mag hydrox-al hydrox-simeth, ondansetron **OR** ondansetron, traZODone, sodium chloride, ipratropium-albuterol, melatonin    Diet:  Current Diet Order   Procedures    Diet Order Diet: Regular     [facility] 5/24/24 [facility] 6/5/24 [facility]      Study is compared to prior from 5/28/2024. Since the previous study the left basal ganglia/thalamic hemorrhage has decreased in size. Previously was 1.7 x 1.3 cm and currently 1.2 x 1.0 cm. Surrounding vasogenic edema is similar.       Medical Decision Making and Plan:  Left Basal Ganglia ICH secondary to hypertensive emergency  Right Hemiparesis  PT and OT for mobility and ADLs. Per guidelines, 15 hours per week between PT, OT and/or  SLP.  Follow-up Neurology (Dr. Babb)  Secondary stroke ppx: Anti-hypertensives    6/12 BP finally controlled and < 140 SBP    FACE TO FACE: Patient is wheelchair confined when out of bed. Requires manual wheelchair to meet mobility needs. Patient has been trained on use and demonstrated ability to use. Have verified ability to use within the home.     FACE TO FACE: Right AFO needed due to right hemiparesis and right foot drop.    Headache  Neck Pain  6/3 VSS. No other symptoms  6/4 Resolved. Tylenol relieved her symptoms. No change in neuro status.   6/5 Continues to have headache which returned last night severely. Recently started on DVT ppx. STAT CTH to ensure no expansion of bleed. CTH showing subacute ICH. D/w rads directly 6/5/2024 3:05 PM - not concerned for it being acute, looks older, but will get [facility] for formal comparison. Hold Lovenox for now to error on side of caution, recent US BLE neg for DVT. Restarted on Lovenox without issue.   6/6 Oxycodone 2.5mg daily PRN headache  Resolved      Hypertension  Continue Hydralazine 50mg q6 hours --> 6/5 75mg every 6 hours  Continue Lisinopril 40mg daily --> 6/7 60mg daily --> 6/10 Increase to 80 mg daily   Continue Amlodipine 10mg daily   6/19 BP controlled      Leukocytosis  Improving on admission but unclear cause  Continue Rocephin x 2 doses (through 5/31) - ok to d/c IV after  6/14 stable 10.8 - stays in 10's  6/18 Mild leukocytosis. Check UA and CXR. Negative.   6/19 Back in 10's. Patient likely at baseline between 10-11.      Urinary Retention  Has otero - required CIC   Resolved. Off of flomax    Chronic Cough  Secondary to years of smoking. D/w patient and daughter, nothing has changed in terms of her cough, nothing new or concerning to them.   Tessalon perls scheduled TID    Hyperphosphatemia  6/3 Resolved, Ph normal     Anemia  6/19 Stable in 10's     Neuropathic Pain  6/3 Continue Gabapentin - increase to 400mg TID --> 6/13 600mg TID--> 6/19 800mg TID      Bowel   6/19 Having regular bowel movements          DVT PROPHYLAXIS: None - Hemorrhagic stroke. Check US - negative. Per Neuro note, clear for DVT chemo-ppx once MRI completed (completed 5/24). Lovenox 40 mg SQ started 6/3. Hold Lovenox 6/5/2024 until can formally compare CTH but per d/w radiologist directly low c/f acute bleed as appears older. Reviewed personally and no acute finding on new CTH. 6/7 Restart Lovenox ppx. Stop prior to d/c home.      HOSPITALIST FOLLOWING: No     CODE STATUS: FULL CODE     DISPO: Home with minimal support     VIANCA: 6/20/24 - Requesting extension as patient making really good progress     MEDS SENT TO: [facility] on [address]     DISCHARGE SPECIALIST FOLLOW UP: Neurology     Patient to scheduled follow up with their PCP within 2 weeks from discharge from the [facility].     Dr. Sofi Jules DO, MS  Department of Physical Medicine & Rehabilitation

## 2024-06-19 NOTE — PROGRESS NOTES
NURSING DAILY NOTE    Name: Latanya Ferrera   Date of Admission: 5/29/2024   Admitting Diagnosis: Hemorrhagic stroke (HCC)  Attending Physician: Sofi Jules D.o.  Allergies: Pineapple    Safety  Patient Assist  Stand By Assist  Patient Precautions  Fall Risk  Precaution Comments  R hemiplegia  Bed Transfer Status  Supervised  Toilet Transfer Status   Minimal Assist (for LOB x 1 c/ STS from toilet)  Assistive Devices  Rails, Wheelchair  Oxygen  None - Room Air  Diet/Therapeutic Dining  Current Diet Order   Procedures    Diet Order Diet: Regular     Pill Administration  whole  Agitated Behavioral Scale     ABS Level of Severity       Fall Risk  Has the patient had a fall this admission?   Yes  Carly Fisher Fall Risk Scoring  11, MODERATE RISK  Fall Risk Safety Measures  bed alarm and chair alarm    Vitals  Temperature: 37.2 °C (98.9 °F)  Temp src: Oral  Pulse: 87  Respiration: 18  Blood Pressure: 136/72  Blood Pressure MAP (Calculated): 93 MM HG  BP Location: Left, Upper Arm  Patient BP Position: Jolley's Position     Oxygen  Pulse Oximetry: 95 %  O2 (LPM): 0  O2 Delivery Device: None - Room Air    Bowel and Bladder  Last Bowel Movement  06/17/24  Stool Type  Type 4: Like a sausage or snake, smooth and soft  Bowel Device  Bathroom  Continent  Bladder: Did not void   Bowel: No movement  Bladder Function  Urine Void (mL):  (large)  Number of Times Voided: 1  Urinary Options: Yes  Urine Color: Orange  Genitourinary Assessment   Bladder Assessment (WDL):  Within Defined Limits  Ramirez Catheter: Not Applicable  Ramirez Reasons per MD Order: Acute urinary retention or bladder outlet obstruction  Ramirez Care: Given with Soap and Water  Urinary Elimination: Catheter (Document on LDA)  Urine Color: Orange  Bladder Device: Bathroom  Bladder Scan: Post Void  $ Bladder Scan Results (mL): 100  Bladder Medications: Yes    Skin  Xavier Score   18  Sensory Interventions   Bed  Types: Standard/Trauma Mattress  Skin Preventative Measures: Waffle Overlay  Moisture Interventions  Moisturizers/Barriers: Barrier Cream  Containment Devices: Indwelling Catheter      Pain  Pain Rating Scale  9 - Can't bear the pain, unable to do anything  Pain Location  Leg  Pain Location Orientation  Right  Pain Interventions   Declines    ADLs    Bathing   Shower, * * With Assistance from, Staff  Linen Change   Complete  Personal Hygiene     Chlorhexidine Bath      Oral Care  Brushed Teeth  Teeth/Dentures  Missing Teeth (Comments)  Shave     Nutrition Percentage Eaten  *  * Meal *  *, Between 50-75% Consumed  Environmental Precautions  Treaded Slipper Socks on Patient  Patient Turns/Positioning  Patient Turns Self from Side to Side  Patient Turns Assistance/Tolerance  Assistance of One  Bed Positions  Bed Locked, Bed Controls On  Head of Bed Elevated  Self regulated      Psychosocial/Neurologic Assessment  Psychosocial Assessment  Psychosocial (WDL):  Within Defined Limits  Patient Behaviors: Fatigue  Family Behaviors: No Family Present  Neurologic Assessment  Neuro (WDL): Exceptions to WDL  Level of Consciousness: Alert  Orientation Level: Oriented X4  Cognition: Follows commands, Appropriate attention/concentration  Speech: Clear  Pupil Assesment: No  R Pupil Size (mm): 3  R Pupil Shape / Description: Round  R Pupil Reaction: Brisk  L Pupil Size (mm): 3  L Pupil Shape / Description: Round  L Pupil Reaction: Brisk  Motor Function/Sensation Assessment: Sensation, Motor strength  RUE Sensation: Numbness  Muscle Strength Right Arm: Fair Strength against Gravity but No Resistance  LUE Sensation: Full sensation  Muscle Strength Left Arm: Normal Strength Against Gravity and Full Resistance  RLE Sensation: Numbness  Muscle Strength Right Leg: Fair Strength against Gravity but No Resistance  LLE Sensation: Full sensation  Muscle Strength Left Leg: Normal Strength Against Gravity and Full Resistance  EENT (WDL):  Within  Defined Limits    Cardio/Pulmonary Assessment  Edema   RLE Edema: Trace  LLE Edema: Trace  Respiratory Breath Sounds  RUL Breath Sounds: Clear  RML Breath Sounds: Clear  RLL Breath Sounds: Clear  OZIEL Breath Sounds: Clear  LLL Breath Sounds: Clear  Cardiac Assessment   Cardiac (WDL):  WDL Except

## 2024-06-19 NOTE — DISCHARGE PLANNING
CM met with patient and her daughter Alcira (at ).  Patient got her WC.  CM explained to patient and her daughter that have been unable to get an accepting HH Co.  Explained put in a call to [facility] but haven't heard back yet.  CM will send blanket to rest but explained that if no accepting HH Co then patient will need to do OP therapies.  CM explained will be out for a couple of days but Jolie, CM will FU.  Explained patient can call if any questions after DC home tomorrow and CM will keep patient updated RE: HH v. OP therapies.

## 2024-06-19 NOTE — DISCHARGE PLANNING
ATTN: Case Management  RE: Referral for Home Health    Reason for referral denial: We are not in network with patient [employer] plan              Unfortunately, we are not able to accept this referral for the reason listed above. If further clarity is needed, our Transitional Care Specialists are available to discuss any barriers to service at [phone].      We look forward to collaborating with you in the future,  [facility] Team

## 2024-06-19 NOTE — THERAPY
"Physical Therapy   Daily Treatment     Patient Name: Latanya Ferrera  Age:  58 y.o., Sex:  female  Medical Record #: [id number]  Today's Date: 6/19/2024     Precautions  Precautions: Fall Risk  Comments: R hemiplegia    Subjective    Patient agreeable to treatment     Objective       06/19/24 1531   PT Charge Group   PT Gait Training (Units) 1   PT Therapeutic Activities (Units) 1   PT Total Time Spent   PT Individual Total Time Spent (Mins) 30   Precautions   Precautions Fall Risk   Comments R hemiplegia   Gait Functional Level of Assist    Gait Level Of Assist Standby Assist   Assistive Device Front Wheel Walker   Distance (Feet) 250   # of Times Distance was Traveled 1   Transfer Functional Level of Assist   Bed, Chair, Wheelchair Transfer Supervised   Interdisciplinary Plan of Care Collaboration   IDT Collaboration with  Physical Therapist   Collaboration Comments treatment planning     Treatment session focused on passport completion- able to check off PT criteria, assisted patient write in answers    Assessment    Able to ambulate main gym to room with FWW SBA, cues to widen stance and take shorter step length as fatigues for safety    Strengths: Able to follow instructions, Alert and oriented, Independent prior level of function, Motivated for self care and independence, Pleasant and cooperative, Supportive family, Willingly participates in therapeutic activities  Barriers: Decreased endurance, Difficulty following instructions, Fatigue, Hemiplegia, Home accessibility, Hypertension, Impaired balance, Impaired carryover of learning, Limited mobility (lives alone, decreased knowledge of condition, limited daytime family support)    Plan    Prepare for discharge- FT with dtr prior to discharge    DME  PT DME Recommendations  Wheelchair: 18\" Width, Jaciel Height (16\"-17\"), Lightweight, Removable/Flip Back Armrests, Standard Leg Rests, Anti-tippers  Cushion: Standard  Assistive Device: Front Wheeled Walker  Additional " Equipment: Other (Comments) (R AFO)    Passport items to be completed:  caregiver training    Physical Therapy Problems (Active)       Problem: PT-Long Term Goals       Dates: Start:  05/30/24         Goal: LTG-By discharge, patient will transfer one surface to another c/ Boni.        Dates: Start:  05/30/24            Goal: LTG-By discharge, patient will ambulate up/down 2 stairs c/ CGA or better to access home.        Dates: Start:  05/30/24

## 2024-06-19 NOTE — PROGRESS NOTES
NURSING DAILY NOTE    Name: Latanya Ferrera   Date of Admission: 5/29/2024   Admitting Diagnosis: Hemorrhagic stroke (HCC)  Attending Physician: Sofi Jules D.o.  Allergies: Pineapple    Safety  Patient Assist  Stand By Assist  Patient Precautions  Fall Risk  Precaution Comments  R hemiplegia  Bed Transfer Status  Supervised  Toilet Transfer Status   Minimal Assist (for LOB x 1 c/ STS from toilet)  Assistive Devices  Rails, Wheelchair  Oxygen  None - Room Air  Diet/Therapeutic Dining  Current Diet Order   Procedures    Diet Order Diet: Regular     Pill Administration  whole  Agitated Behavioral Scale     ABS Level of Severity       Fall Risk  Has the patient had a fall this admission?   No  Carly Fisher Fall Risk Scoring  11, MODERATE RISK  Fall Risk Safety Measures  bed alarm and chair alarm    Vitals  Temperature: 36.6 °C (97.8 °F)  Temp src: Oral  Pulse: 81  Respiration: 18  Blood Pressure: 131/73  Blood Pressure MAP (Calculated): 92 MM HG  BP Location: Left, Upper Arm  Patient BP Position: Jolley's Position     Oxygen  Pulse Oximetry: 94 %  O2 (LPM): 0  O2 Delivery Device: None - Room Air    Bowel and Bladder  Last Bowel Movement  06/17/24  Stool Type  Type 4: Like a sausage or snake, smooth and soft  Bowel Device  Bathroom  Continent  Bladder: Did not void   Bowel: No movement  Bladder Function  Urine Void (mL):  (large)  Number of Times Voided: 1  Urinary Options: Yes  Urine Color: Unable To Evaluate  Genitourinary Assessment   Bladder Assessment (WDL):  Within Defined Limits  Ramirez Catheter: Not Applicable  Ramirez Reasons per MD Order: Acute urinary retention or bladder outlet obstruction  Ramirez Care: Given with Soap and Water  Urinary Elimination: Catheter (Document on LDA)  Urine Color: Unable To Evaluate  Bladder Device: Bathroom  Bladder Scan: Post Void  $ Bladder Scan Results (mL): 100  Bladder Medications: Yes    Skin  Xavier Score    18  Sensory Interventions   Bed Types: Standard/Trauma Mattress  Skin Preventative Measures: Waffle Overlay  Moisture Interventions  Moisturizers/Barriers: Barrier Cream  Containment Devices: Indwelling Catheter      Pain  Pain Rating Scale  10 - As bad as it could be, nothing else matters  Pain Location  Head  Pain Location Orientation  Right  Pain Interventions   Medication (see MAR)    ADLs    Bathing   Shower, * * With Assistance from, Staff  Linen Change   Partial  Personal Hygiene     Chlorhexidine Bath      Oral Care  Brushed Teeth  Teeth/Dentures  Missing Teeth (Comments)  Shave     Nutrition Percentage Eaten  *  * Meal *  *, Between 50-75% Consumed  Environmental Precautions  Treaded Slipper Socks on Patient  Patient Turns/Positioning  Patient Turns Self from Side to Side  Patient Turns Assistance/Tolerance  Assistance of One  Bed Positions  Bed Locked, Bed Controls On  Head of Bed Elevated  Self regulated      Psychosocial/Neurologic Assessment  Psychosocial Assessment  Psychosocial (WDL):  Within Defined Limits  Patient Behaviors: Fatigue  Family Behaviors: No Family Present  Neurologic Assessment  Neuro (WDL): Exceptions to WDL  Level of Consciousness: Alert  Orientation Level: Oriented X4  Cognition: Follows commands, Appropriate attention/concentration  Speech: Clear  Pupil Assesment: No  R Pupil Size (mm): 3  R Pupil Shape / Description: Round  R Pupil Reaction: Brisk  L Pupil Size (mm): 3  L Pupil Shape / Description: Round  L Pupil Reaction: Brisk  Motor Function/Sensation Assessment: Sensation, Motor strength  RUE Sensation: Numbness  Muscle Strength Right Arm: Fair Strength against Gravity but No Resistance  LUE Sensation: Full sensation  Muscle Strength Left Arm: Normal Strength Against Gravity and Full Resistance  RLE Sensation: Numbness  Muscle Strength Right Leg: Fair Strength against Gravity but No Resistance  LLE Sensation: Full sensation  Muscle Strength Left Leg: Normal Strength Against  Gravity and Full Resistance  EENT (WDL):  Within Defined Limits    Cardio/Pulmonary Assessment  Edema   RLE Edema: Trace  LLE Edema: Trace  Respiratory Breath Sounds  RUL Breath Sounds: Clear  RML Breath Sounds: Clear  RLL Breath Sounds: Clear  OZIEL Breath Sounds: Clear  LLL Breath Sounds: Clear  Cardiac Assessment   Cardiac (WDL):  WDL Except

## 2024-06-19 NOTE — PROGRESS NOTES
NURSING DAILY NOTE    Name: Latanya Ferrera   Date of Admission: 5/29/2024   Admitting Diagnosis: Hemorrhagic stroke (HCC)  Attending Physician: Sofi Jules D.o.  Allergies: Pineapple    Safety  Patient Assist  Stand By Assist  Patient Precautions  Fall Risk  Precaution Comments  R hemiplegia  Bed Transfer Status  Supervised  Toilet Transfer Status   Minimal Assist (for LOB x 1 c/ STS from toilet)  Assistive Devices  Rails, Wheelchair  Oxygen  None - Room Air  Diet/Therapeutic Dining  Current Diet Order   Procedures    Diet Order Diet: Regular     Pill Administration  whole  Agitated Behavioral Scale     ABS Level of Severity       Fall Risk  Has the patient had a fall this admission?   No  Carly Fisher Fall Risk Scoring  10, LOW RISK  Fall Risk Safety Measures  bed alarm and chair alarm    Vitals  Temperature: 37.2 °C (98.9 °F)  Temp src: Oral  Pulse: 86  Respiration: 18  Blood Pressure: 139/75  Blood Pressure MAP (Calculated): 96 MM HG  BP Location: Left, Upper Arm  Patient BP Position: Jolley's Position     Oxygen  Pulse Oximetry: 95 %  O2 (LPM): 0  O2 Delivery Device: None - Room Air    Bowel and Bladder  Last Bowel Movement  06/17/24  Stool Type  Type 4: Like a sausage or snake, smooth and soft  Bowel Device  Bathroom  Continent  Bladder: Did not void   Bowel: No movement  Bladder Function  Urine Void (mL):  (large)  Number of Times Voided: 1  Urinary Options: Yes  Urine Color: Orange  Genitourinary Assessment   Bladder Assessment (WDL):  Within Defined Limits  Ramirez Catheter: Not Applicable  Ramirez Reasons per MD Order: Acute urinary retention or bladder outlet obstruction  Ramirez Care: Given with Soap and Water  Urinary Elimination: Catheter (Document on LDA)  Urine Color: Orange  Bladder Device: Bathroom  Bladder Scan: Post Void  $ Bladder Scan Results (mL): 100  Bladder Medications: Yes    Skin  Xavier Score   18  Sensory Interventions   Bed Types:  Standard/Trauma Mattress  Skin Preventative Measures: Waffle Overlay  Moisture Interventions  Moisturizers/Barriers: Barrier Cream  Containment Devices: Indwelling Catheter      Pain  Pain Rating Scale  7 - Focus of attention, prevents doing daily activities  Pain Location  Leg  Pain Location Orientation  Right  Pain Interventions   Declines    ADLs    Bathing   Shower, * * With Assistance from, Staff  Linen Change   Complete  Personal Hygiene     Chlorhexidine Bath      Oral Care  Brushed Teeth  Teeth/Dentures  Missing Teeth (Comments)  Shave     Nutrition Percentage Eaten  *  * Meal *  *, Between 50-75% Consumed  Environmental Precautions  Treaded Slipper Socks on Patient  Patient Turns/Positioning  Patient Turns Self from Side to Side  Patient Turns Assistance/Tolerance  Assistance of One  Bed Positions  Bed Locked, Bed Controls On  Head of Bed Elevated  Self regulated      Psychosocial/Neurologic Assessment  Psychosocial Assessment  Psychosocial (WDL):  Within Defined Limits  Patient Behaviors: Fatigue  Family Behaviors: No Family Present  Neurologic Assessment  Neuro (WDL): Exceptions to WDL  Level of Consciousness: Alert  Orientation Level: Oriented X4  Cognition: Follows commands, Appropriate attention/concentration  Speech: Clear  Pupil Assesment: No  R Pupil Size (mm): 3  R Pupil Shape / Description: Round  R Pupil Reaction: Brisk  L Pupil Size (mm): 3  L Pupil Shape / Description: Round  L Pupil Reaction: Brisk  Motor Function/Sensation Assessment: Sensation, Motor strength  RUE Sensation: Numbness  Muscle Strength Right Arm: Fair Strength against Gravity but No Resistance  LUE Sensation: Full sensation  Muscle Strength Left Arm: Normal Strength Against Gravity and Full Resistance  RLE Sensation: Numbness  Muscle Strength Right Leg: Fair Strength against Gravity but No Resistance  LLE Sensation: Full sensation  Muscle Strength Left Leg: Normal Strength Against Gravity and Full Resistance  EENT (WDL):   Within Defined Limits    Cardio/Pulmonary Assessment  Edema   RLE Edema: Trace  LLE Edema: Trace  Respiratory Breath Sounds  RUL Breath Sounds: Clear  RML Breath Sounds: Clear  RLL Breath Sounds: Clear  OZIEL Breath Sounds: Clear  LLL Breath Sounds: Clear  Cardiac Assessment   Cardiac (WDL):  WDL Except

## 2024-06-19 NOTE — DISCHARGE INSTRUCTIONS
[facility] NURSING DISCHARGE INSTRUCTIONS    Blood Pressure: 138/60  Weight: 81.6 kg (180 lb)  Nursing recommendations for Latanya Ferrera at time of discharge are as follows:  Client verbalized understanding of all discharge instructions and prescriptions.     Review all your home medications and newly ordered medications with your doctor and/or pharmacist. Follow medication instructions as directed by your doctor and/or pharmacist.    Pain Management:   Discharge Pain Medication Instructions:  Comfort Goal: Comfort with Movement, Perform Activity  Notify your primary care provider if pain is unrelieved with these measures, if the pain is new, or increased in intensity.    Discharge Skin Characteristics: Warm, Dry  Discharge Skin Exam: Clear     Skin / Wound Care Instructions: Please contact your primary care physician for any change in skin integrity. Skin intact    If You Have Surgical Incisions / Wounds:  Monitor surgical site(s) for signs of increased swelling, redness or symptoms of drainage from the site or fever as this could indicate signs and symptoms of infection. If these symptoms are noted, notifiy your primary care provider.      Discharge Safety Instructions: Should Not Be Left Alone In The House     Discharge Safety Concerns: Balance Problems (Dizziness, Light Headedness), Weakness, Unsteady Gait  The interdisciplinary team has made recommendation that you should have adult supervision in the house due to balance problem, weakness, and unsteady gait  Anti-embolic stockings are not required to increase circulation to the lower extremities.    Discharge Diet: Cardiac Diet     Discharge Liquids: Thin  Discharge Bowel Function: Continent  Please contact your primary care physician for any changes in bowel habits.  Discharge Bowel Program:    Discharge Bladder Function: Continent  Discharge Urinary Devices: Pad      Nursing Discharge Plan:        Case Management Discharge  Instructions:   Discharge Location: Home  Agency Name/Address/Phone:    Home Health:    Outpatient Services:    DME Provider/Phone:    Medical Equipment Ordered: Wheelchair  Prescription Faxed to:        Discharge Medication Instructions:  Below are the medications your physician expects you to take upon discharge:    Fall Prevention in the Home, Adult  Falls can cause injuries and can happen to people of all ages. There are many things you can do to make your home safe and to help prevent falls. Ask for help when making these changes.  What actions can I take to prevent falls?  General Instructions  Use good lighting in all rooms. Replace any light bulbs that burn out.  Turn on the lights in dark areas. Use night-lights.  Keep items that you use often in easy-to-reach places. Lower the shelves around your home if needed.  Set up your furniture so you have a clear path. Avoid moving your furniture around.  Do not have throw rugs or other things on the floor that can make you trip.  Avoid walking on wet floors.  If any of your floors are uneven, fix them.  Add color or contrast paint or tape to clearly amndie and help you see:  Grab bars or handrails.  First and last steps of staircases.  Where the edge of each step is.  If you use a stepladder:  Make sure that it is fully opened. Do not climb a closed stepladder.  Make sure the sides of the stepladder are locked in place.  Ask someone to hold the stepladder while you use it.  Know where your pets are when moving through your home.  What can I do in the bathroom?         Keep the floor dry. Clean up any water on the floor right away.  Remove soap buildup in the tub or shower.  Use nonskid mats or decals on the floor of the tub or shower.  Attach bath mats securely with double-sided, nonslip rug tape.  If you need to sit down in the shower, use a plastic, nonslip stool.  Install grab bars by the toilet and in the tub and shower. Do not use towel bars as grab bars.  What  can I do in the bedroom?  Make sure that you have a light by your bed that is easy to reach.  Do not use any sheets or blankets for your bed that hang to the floor.  Have a firm chair with side arms that you can use for support when you get dressed.  What can I do in the kitchen?  Clean up any spills right away.  If you need to reach something above you, use a step stool with a grab bar.  Keep electrical cords out of the way.  Do not use floor polish or wax that makes floors slippery.  What can I do with my stairs?  Do not leave any items on the stairs.  Make sure that you have a light switch at the top and the bottom of the stairs.  Make sure that there are handrails on both sides of the stairs. Fix handrails that are broken or loose.  Install nonslip stair treads on all your stairs.  Avoid having throw rugs at the top or bottom of the stairs.  Choose a carpet that does not hide the edge of the steps on the stairs.  Check carpeting to make sure that it is firmly attached to the stairs. Fix carpet that is loose or worn.  What can I do on the outside of my home?  Use bright outdoor lighting.  Fix the edges of walkways and driveways and fix any cracks.  Remove anything that might make you trip as you walk through a door, such as a raised step or threshold.  Trim any bushes or trees on paths to your home.  Check to see if handrails are loose or broken and that both sides of all steps have handrails.  Install guardrails along the edges of any raised decks and porches.  Clear paths of anything that can make you trip, such as tools or rocks.  Have leaves, snow, or ice cleared regularly.  Use sand or salt on paths during winter.  Clean up any spills in your garage right away. This includes grease or oil spills.  What other actions can I take?  Wear shoes that:  Have a low heel. Do not wear high heels.  Have rubber bottoms.  Feel good on your feet and fit well.  Are closed at the toe. Do not wear open-toe sandals.  Use  tools that help you move around if needed. These include:  Canes.  Walkers.  Scooters.  Crutches.  Review your medicines with your doctor. Some medicines can make you feel dizzy. This can increase your chance of falling.  Ask your doctor what else you can do to help prevent falls.  Where to find more information  Centers for Disease Control and PreventionROYA: www.cdc.gov  National [place] on Aging: www.pauline.nih.gov  Contact a doctor if:  You are afraid of falling at home.  You feel weak, drowsy, or dizzy at home.  You fall at home.  Summary  There are many simple things that you can do to make your home safe and to help prevent falls.  Ways to make your home safe include removing things that can make you trip and installing grab bars in the bathroom.  Ask for help when making these changes in your home.  This information is not intended to replace advice given to you by your health care provider. Make sure you discuss any questions you have with your health care provider.    Depression, Adult  Depression is feeling sad, low, down in the dumps, blue, gloomy, or empty. In general, there are two kinds of depression:  Normal sadness or grief. This can happen after something upsetting. It often goes away on its own within 2 weeks. After losing a loved one (bereavement), normal sadness and grief may last longer than two weeks. It usually gets better with time.  Clinical depression. This kind lasts longer than normal sadness or grief. It keeps you from doing the things you normally do in life. It is often hard to function at home, work, or at school. It may affect your relationships with others. Treatment is often needed.  GET HELP RIGHT AWAY IF:  You have thoughts about hurting yourself or others.  You lose touch with reality (psychotic symptoms). You may:  See or hear things that are not real.  Have untrue beliefs about your life or people around you.  Your medicine is giving you problems.  MAKE SURE  YOU:  Understand these instructions.  Will watch your condition.  Will get help right away if you are not doing well or get worse.     This information is not intended to replace advice given to you by your health care provider. Make sure you discuss any questions you have with your health care provider.     Pain Medicine Instructions  You may need pain medicine after an injury or illness. Two common types of pain medicine are:  Non-opioid pain medicine. This includes NSAIDs.  Opioid pain medicine. These may be called opioids.  Pain medicine may not make all of your pain go away. It should make you comfortable enough to move, breathe, and do normal activities.  How can pain medicines affect me?  Pain medicines can cause side effects such as:  Vomiting or feeling like you may vomit.  Belly pain.  Opioids can cause other side effects, such as:  Trouble pooping (constipation).  Feeling very sleepy.  Confusion.  Trouble breathing.  Addiction to opioids. This means that you will take the medicine even though it hurts your health.  Taking opioids for longer than 3 days raises your risk of these side effects.  Taking opioids for a long time can affect how well you can do daily tasks. It also puts you at risk for:  Car crashes.  Depression.  Suicide.  Heart attack.  If you do not take pain medicines correctly, you may be at risk for:  Liver problems.  Kidney problems.  Taking too much of the medicine (overdose). This can lead to death.  What actions can I take to lower my risk of problems?  Know your treatment plan  Talk about your treatment plan with your doctor. Both you and your doctor should agree on how you should be treated.  Talk about the goals of your treatment, including:  How much pain you might expect to have.  How you will manage the pain.  Ask your doctor if you can see other doctors who can treat your pain without using medicine. This can include physical therapy and counseling.  Talk about the risks and  benefits of taking these medicines for your condition.  Tell your doctor about the amount of medicines you take and about any use of drugs or alcohol.  Get your pain medicine prescriptions from only one doctor.  Keep all follow-up visits.  Take your medicine as told    Take pain medicine exactly as told by your doctor. Take it only when you need it.  If your pain is not too bad, you may take less medicine if your doctor allows.  If you have no pain, do not take the medicine unless your doctor tells you to take it.  If your pain is very bad, do not take more medicine than your doctor tells you to take. Call your doctor to know what to do.  If your pain medicine has acetaminophen in it, do not take any other acetaminophen while you are taking this medicine. Too much can damage the liver.  Write down the times when you take your pain medicine. Look at the times before you take your next dose.  Take other over-the-counter or prescription medicines only as told by your doctor.  Avoid certain activities  While you are taking prescription pain medicine, and for 8 hours after your last dose:  Do not drive.  Do not use machinery.  Do not use power tools.  Do not sign legal documents.  Do not drink alcohol.  Do not take sleeping pills.  Do not take care of children by yourself.  Do not do any activities that involve climbing or being in high places.  Do not go to a lake, river, ocean, spa, or swimming pool unless an adult is nearby who can monitor and help you.    Keep pets and people safe  Store your medicine as told by your doctor. Keep it where children and pets cannot reach it.  Do not share your pain medicine with anyone.  Do not save unused pills. If you have unused pills, you can:  Bring them to a take-back program.  Bring them to a pharmacy that takes back unused pills.  Throw them in the trash. Check the medicine label or package insert to see if it is safe to throw it out. If it is safe, take the medicine out of the  container. Mix it with something that makes it unusable, such as pet waste. Then put the medicine in the trash.  Flush them down the toilet only if this is safe to do. To find out:  Check the label or package insert of your medicine.  Read information given by the Food and Drug Administration website: fda.gov  Treat or prevent constipation  You may need to take these actions to prevent or treat constipation:  Drink enough fluid to keep your pee (urine) pale yellow.  Take over-the-counter or prescription medicines.  Eat foods that are high in fiber. These include beans, whole grains, and fresh fruits and vegetables.  Limit foods that are high in fat and sugar. These include fried or sweet foods.  Contact a doctor if:  Your medicine is not helping with your pain.  You have a rash.  You feel sick to your stomach.  You throw up.  You feel depressed.  Get help right away if:  You have trouble breathing. This means:  Breathing that is slower than normal.  Breathing that is more shallow than normal.  You are confused.  You are sleeping a lot, or you have trouble staying awake.  Your skin or lips turn pale or bluish in color.  You tongue swells.  You have thoughts of harming yourself or harming others.  These symptoms may be an emergency. Get help right away. Call your local emergency services (378 in the [place]  Do not wait to see if the symptoms will go away.  Do not drive yourself to the hospital.  Get help right away if you feel like you may hurt yourself or others, or have thoughts about taking your own life. Go to your nearest emergency room or:  Call your local emergency services (787 in the [place]  Call the National Suicide Prevention Lifeline at 1-498.672.2706 or 138 in the [place] This is open 24 hours a day.  Text the Crisis Text Line at 435433.  Summary  Pain medicine can help lower your pain. It may also cause side effects.  Take your pain medicine exactly as told by your doctor.  Talk with your doctor about other  ways to manage your pain.  Ask what activities you should avoid while taking pain medicine.  This information is not intended to replace advice given to you by your health care provider. Make sure you discuss any questions you have with your health care provider.    Hemorrhagic Stroke    A hemorrhagic stroke happens when a blood vessel in the brain leaks or bursts (ruptures). This causes bleeding in or around the brain (hemorrhage) and leads to the sudden death of brain tissue. A hemorrhagic stroke is a medical emergency. It can cause brain damage and death.  There are two major types of hemorrhagic stroke:  Intracerebral hemorrhage. This happens when bleeding occurs within the brain tissue.  Subarachnoid hemorrhage. This happens when bleeding occurs in the area between the brain and the membrane that covers the brain (subarachnoid space).  What are the causes?  This condition may be caused by:  Head injury (trauma).  Part of a weakened blood vessel wall bulging or ballooning out (cerebral aneurysm).  Thin and hardened blood vessels due to plaque buildup.  Tangled blood vessels in the brain (arteriovenous malformation).  Protein buildup on the artery walls of the brain (amyloid angiopathy).  Inflamed blood vessels (vasculitis).  A brain tumor.  Sometimes, the cause of this condition is not known.  What increases the risk?  The following factors may make you more likely to develop this condition:  Hypertension.  Having abnormal blood vessels present since birth (congenital abnormality).  Bleeding disorders, such as hemophilia, sickle cell disease, or liver disease.  Blood thinners (anticoagulants) that make the blood too thin.  Being an older adult.  Moderate or heavy alcohol use.  Using drugs, such as cocaine or methamphetamines.  What are the signs or symptoms?  Symptoms of this condition include:  The sudden onset of:  Weakness or numbness of the face, arm, or leg, especially on one side of the  body.  Confusion.  Trouble speaking or understanding speech.  Difficulty seeing in one or both eyes.  Difficulty walking or moving the arms or legs.  Dizziness, or loss of balance or coordination.  Nausea and vomiting.  A severe headache with no known cause. This headache may feel like the worst one a person has ever had.  Seizures.  How is this diagnosed?  This condition may be diagnosed based on:  Your symptoms, medical history, and a physical exam.  Tests, including:  Blood tests.  CT scan.  MRI.  CT angiography (CTA) or magnetic resonance angiography (MRA).  Catheter angiogram. In this procedure, dye is injected through a long, thin tube (catheter) into one of your arteries. X-rays are taken and will show whether a blockage or a problem in a blood vessel exists.  How is this treated?  The goals of treatment are to stop the bleeding, reduce pressure on the brain, relieve symptoms, and prevent complications. Treatment may include:  Medicines that do the following:  Lower blood pressure (antihypertensives).  Relieve pain (analgesics), fever, nausea, or vomiting.  Stop or prevent seizures (anticonvulsants).  Prevent blood vessels in the brain from spasming in response to bleeding.  Control bleeding in the brain.  Use of a machine to help you breathe (ventilator).  A blood transfusion to help your blood clot.  Placement of a tube (shunt) in the brain to relieve pressure.  Physical, speech, or occupational therapy.  Surgery to stop the bleeding, remove a blood clot or tumor, or reduce pressure.  Treatment depends on the cause, severity, and duration of symptoms. Talk with your health care provider about what to expect during your recovery.  Follow these instructions at home:  Activity  Use a walker or a cane as told by your health care provider.  Return to your normal activities as told by your health care provider. Ask your health care provider what activities are safe for you.  Rest to help your brain heal. Make  sure you:  Get plenty of sleep. Avoid staying up late at night.  Keep to a sleep schedule. Go to sleep and wake up at about the same time every day.  Avoid activities that cause physical or mental stress.  Lifestyle  Do not drink alcohol if:  Your health care provider tells you not to drink.  You are pregnant, may be pregnant, or are planning to become pregnant.  If you drink alcohol:  Limit how much you use to:  0-1 drink a day for women.  0-2 drinks a day for men.  Know how much alcohol is in your drink. In the [place] one drink equals one 12 oz bottle of beer (355 mL), one 5 oz glass of wine (148 mL), or one 1½ oz glass of hard liquor (44 mL).  General instructions  Do not use any products that contain nicotine or tobacco. These include cigarettes, chewing tobacco, and vaping devices, such as e-cigarettes. If you need help quitting, ask your health care provider.  Do not drive or use heavy machinery until your health care provider approves.  Take over-the-counter and prescription medicines only as told by your health care provider.  Keep all follow-up visits, including those with therapists. This is important.  How is this prevented?  You can reduce your risk of stroke by managing conditions, such as:  High blood pressure.  High cholesterol.  Diabetes.  Heart disease.  Obesity.  Other factors and lifestyle changes that can lower your risk include:  Quitting smoking, limiting alcohol, and staying physically active.  Having your bloodwork monitored frequently if you take anticoagulants (blood thinners).  Contact a health care provider if:  You develop any of the following symptoms:  Headaches that keep coming back (that are chronic).  Nausea.  Vision problems.  Increased sensitivity to noise or light.  Depression, mood swings, anxiety, or irritability.  Memory problems.  Difficulty concentrating or paying attention.  Sleep problems.  Feeling tired all of the time.  Get help right away if:  You have a partial or total  "loss of consciousness.  You are taking blood thinners and you fall or have a minor injury to the head.  You have a bleeding disorder and you fall, or you have a minor trauma to the head.  You have any symptoms of a stroke. \"BE FAST\" is an easy way to remember the main warning signs of a stroke:  B - Balance. Signs are dizziness, sudden trouble walking, or loss of balance.  E - Eyes. Signs are trouble seeing or a sudden change in vision.  F - Face. Signs are sudden weakness or numbness of the face, or the face or eyelid drooping on one side.  A - Arms. Signs are weakness or numbness in an arm. This happens suddenly and usually on one side of the body.  S - Speech. Signs are sudden trouble speaking, slurred speech, or trouble understanding what people say.  T - Time. Time to call emergency services. Write down what time symptoms started.  You have other signs of a stroke, such as:  A sudden, severe headache with no known cause.  Nausea or vomiting.  Seizure.  These symptoms may represent a serious problem that is an emergency. Do not wait to see if the symptoms will go away. Get medical help right away. Call your local emergency services (911 in the [place] Do not drive yourself to the hospital.  Summary  Hemorrhagic stroke is caused by bleeding in or around the brain.  Hemorrhagic stroke is a medical emergency.  Know the signs and symptoms of stroke.  You can reduce your risk of stroke by managing conditions, quitting smoking, and making other lifestyle changes.  This information is not intended to replace advice given to you by your health care provider. Make sure you discuss any questions you have with your health care provider.  Document Revised: 09/21/2021 Document Reviewed: 09/21/2021  Elsevier Patient Education © 2023 [employer]    Occupational Therapy Discharge Instructions for Latanya Ferrera    6/19/2024    Level of Assist Required for Eating: Able to Complete Eating without Assist  Level of Assist Required " for Grooming: Able to Complete Grooming without Assist  Level of Assist Required for Dressing: Able to Complete Dressing without Assist  Level of Assist Required for Toileting: Able to Complete Toileting without Assist  Level of Assist Required for Toilet Transfer: Able to Complete Toilet Transfer without Assist  Equipment for Toilet Transfer: Grab Bars by Toilet  Level of Assist Required for Bathing: Able to Complete Bathing without Assist  Equipment for Bathing: Shower Chair, Grab Bars in Tub / Shower, Hand Held Shower Head  Level of Assist Required for Shower Transfer: Able to Complete Shower Transfer without Assist  Equipment for Shower Transfer: Grab Bars in Tub / Shower, Shower Chair  Level of Assist Required for Home Mgmt: Requires Supervision with Home Management  Level of Assist Required for Meal Prep: Requires Supervision with Meal Preparation  Driving: May not Drive, Please Contact Physician for Further Information  Home Exercise Program: Refer to Home Exercise Program Handout for Details    Physical Therapy Discharge Instructions for Latanya Ferrera    6/19/2024    Level of Assist Required for Ambulation: Supervision on Flat Surfaces, Assist for Balance on Curbs, Assist for Balance on Stairs  Distance Patient May Ambulate: as tolerated, pay attention to your foot dragging and don't get sloppy  Device Recommended for Ambulation: Front-Wheeled Walker  Level of Assist Required to Propel Wheelchair: Requires No Assist  Level of Assist Required for Transfers: Requires No Assist  Device Recommended for Transfers: Front-Wheeled Walker  Home Exercise Program: Refer to Home Exercise Program Handout for Details    Take your time! It was great working with you! Come back and visit! ~ Christina Fisher, PT

## 2024-06-19 NOTE — THERAPY
Occupational Therapy   Discharge Summary     Patient Name: Latanya Ferrera  Age:  58 y.o., Sex:  female  Medical Record #: [id number]  Today's Date: 6/19/2024     Precautions  Precautions: Fall Risk  Comments: R hemiplegia         Subjective  Pt supine in bed upon arrival during first and immediately denied therapy d/t headache and R leg pain during first session. Stated that both were 10/10 pain and she needed to rest. Agreeable to afternoon shower during second session.     Objective       06/19/24 0831   Vitals   Patient BP Position Supine   Blood Pressure 126/59   Pulse Oximetry 90 %   Vitals Comments c/o of headache   Pain 0 - 10 Group   Location Head   Pain Rating Scale (NPRS) 10   Description Aching   Therapist Pain Assessment Prior to Activity   Interdisciplinary Plan of Care Collaboration   Patient Position at End of Therapy In Bed;Call Light within Reach;Tray Table within Reach;Phone within Reach   Eating   Assistance Needed Independent   Physical Assistance Level No physical assistance   CARE Score - Eating 6   Oral Hygiene   Assistance Needed Independent   Physical Assistance Level No physical assistance   CARE Score - Oral Hygiene 6   Toileting Hygiene   Assistance Needed Independent   Physical Assistance Level No physical assistance   CARE Score - Toileting Hygiene 6   Putting On/Taking Off Footwear   Assistance Needed Set-up / clean-up   Physical Assistance Level No physical assistance   CARE Score - Putting On/Taking Off Footwear 5   Toilet Transfer   Assistance Needed Independent   Physical Assistance Level No physical assistance   CARE Score - Toilet Transfer 6   Confusion Assessment Method (CAM)   Is there evidence of an acute change in mental status from the patient's baseline? No   Inattention Behavior not present   Disorganized thinking Behavior not present   Altered level of consciousness Behavior not present   Discharge Summary    Discharge Location  Home   Patient Discharging with Assist of  "Family    Level of Supervision Required Intermittent Supervision   Recommended Equipment for Discharge Front-Wheeled Walker;Shower Chair;Grab Bars by Toilet;Grab Bars in Tub / Shower;Hand Held Shower Head;Manual Wheelchair   Recommended Services Upon Discharge Home Health Occupational Therapy   Long Term Goals Met 2   Long Term Goals Not Met 0   Criteria for Termination of Services Maximum Function Achieved for Inpatient Rehabilitation       Despite pain, vitals were good. Pt was educated on benefit of getting out of bed and getting ready for the day. Pt seated in bed to doff footwear @ Mod I.      06/19/24 1331   Interdisciplinary Plan of Care Collaboration   Patient Position at End of Therapy In Bed;Tray Table within Reach;Call Light within Reach;Phone within Reach   Shower/Bathe Self   Assistance Needed Independent   Physical Assistance Level No physical assistance   CARE Score - Shower/Bathe Self 6   Upper Body Dressing   Assistance Needed Independent   Physical Assistance Level No physical assistance   CARE Score - Upper Body Dressing 6   Lower Body Dressing   Assistance Needed Independent   Physical Assistance Level No physical assistance   CARE Score - Lower Body Dressing 6   Cognitive Pattern Assessment   Cognitive Pattern Assessment Used BIMS   Brief Interview for Mental Status (BIMS)   Repetition of Three Words (First Attempt) 3   Temporal Orientation: Year Correct   Temporal Orientation: Month Accurate within 5 days   Temporal Orientation: Day Correct   Recall: \"Sock\" Yes, no cue required   Recall: \"Blue\" Yes, no cue required   Recall: \"Bed\" No, could not recall   BIMS Summary Score 13     Functional Mobility: bed 1<> bathroom w/ FWW @ supervision  Assessment  Pt completed all bathing/dressing tasks safely and independently. Did not experience and LOB while standing and did not require any physical assistance throughout session. Demo good pace during functional mobility and transfers. Pt verbalized her " ability to understand when she is feeling fatigued/when she should remain seated vs standing.    Strengths: Able to follow instructions, Good insight into deficits/needs, Independent prior level of function, Manages pain appropriately, Motivated for self care and independence, Pleasant and cooperative, Supportive family, Willingly participates in therapeutic activities  Barriers: Decreased endurance, Hemiparesis, Home accessibility, Impaired activity tolerance, Impaired balance (Impaired coordination/motor control)    Plan  Discharge home        Occupational Therapy Goals (Active)       There are no active problems.

## 2024-06-19 NOTE — THERAPY
"Physical Therapy   Discharge Summary     Patient Name: Latanya Ferrera  Age:  58 y.o., Sex:  female  Medical Record #: [id number]  Today's Date: 6/19/2024     Precautions  Precautions: Fall Risk  Comments: R hemiplegia    Subjective  \"I just need to warm up.\" Pt seated EOB at arrival, agreeable to PT tx.      Objective     06/19/24 1231   PT Charge Group   PT Therapeutic Activities (Units) 3   PT Total Time Spent   PT Individual Total Time Spent (Mins) 45   Standing Lower Body Exercises   Comments Access Code: [email or web address]  URL: [email or web address]  Date: 06/19/2024  Prepared by: Boris Stephens    Exercises  - Side Stepping with Counter Support  - 1 x daily - 7 x weekly - 3 sets - 10 reps  - Backward Walking with Counter Support  - 1 x daily - 7 x weekly - 3 sets - 10 reps  - Side Step Overs with Cones and Counter Support  - 1 x daily - 7 x weekly - 3 sets - 10 reps  - Single Leg Balance in March Position  - 1 x daily - 7 x weekly - 3 sets - 5 reps  - Sit to Stand  - 1 x daily - 7 x weekly - 3 sets - 8 reps  - Heel Toe Raises with Unilateral Counter Support  - 1 x daily - 7 x weekly - 3 sets - 10 reps   Interdisciplinary Plan of Care Collaboration   IDT Collaboration with  Physical Therapist   Patient Position at End of Therapy Seated;Edge of Bed;Call Light within Reach;Phone within Reach;Tray Table within Reach   Collaboration Comments POC   Roll Left and Right   Assistance Needed Independent   CARE Score - Roll Left and Right 6   Sit to Lying   Assistance Needed Independent   CARE Score - Sit to Lying 6   Lying to Sitting on Side of Bed   Assistance Needed Independent   CARE Score - Lying to Sitting on Side of Bed 6   Sit to Stand   Assistance Needed Independent   CARE Score - Sit to Stand 6   Chair/Bed-to-Chair Transfer   Assistance Needed Supervision   CARE Score - Chair/Bed-to-Chair Transfer 4   Car Transfer   Assistance Needed Supervision   CARE Score - Car Transfer 4   Walk 10 Feet   Assistance Needed " Supervision;Adaptive equipment   CARE Score - Walk 10 Feet 4   Walk 50 Feet with Two Turns   Assistance Needed Supervision;Adaptive equipment   CARE Score - Walk 50 Feet with Two Turns 4   Walk 150 Feet   Assistance Needed Supervision;Adaptive equipment   CARE Score - Walk 150 Feet 4   Walking 10 Feet on Uneven Surfaces   Assistance Needed Supervision;Incidental touching;Adaptive equipment   CARE Score - Walking 10 Feet on Uneven Surfaces 4   1 Step (Curb)   Assistance Needed Incidental touching;Adaptive equipment;Supervision   CARE Score - 1 Step (Curb) 4   4 Steps   Assistance Needed Supervision   CARE Score - 4 Steps 4   12 Steps   Assistance Needed Supervision   CARE Score - 12 Steps 4   Picking Up Object   Assistance Needed Independent   CARE Score - Picking Up Object 6   Wheel 50 Feet with Two Turns   Assistance Needed Independent;Adaptive equipment   CARE Score - Wheel 50 Feet with Two Turns 6   Wheel 150 Feet   Assistance Needed Independent;Adaptive equipment   CARE Score - Wheel 150 Feet 6   P.T. Discharge Summary   Discharge Location Home   Patient Discharging with Assist of Family   Level of Supervision Required Upon Discharge Intermittent Supervision   Recommended Equipment for Discharge Front-Wheeled Walker;Manual Wheelchair   Recommeded Services Upon Discharge Home Health Physical Therapy   Long Term Goals Met 2   Long Term Goals Not Met 0   Criteria for Termination of Services Maximum Function Achieved for Inpatient Rehabilitation   Discharge Instructions to Patient   Level of Assist Required for Ambulation Supervision on Flat Surfaces;Assist for Balance on Curbs;Assist for Balance on Stairs   Distance Patient May Ambulate as tolerated, pay attention to your foot dragging and don't get sloppy   Device Recommended for Ambulation Front-Wheeled Walker   Level of Assist Required to Propel Wheelchair Requires No Assist   Level of Assist Required for Transfers Requires No Assist   Device Recommended for  "Transfers Front-Wheeled Walker   Home Exercise Program Refer to Home Exercise Program Handout for Details     Completed mobility discharge IRF-Kenn, completed patient passport, discussed home safety and floor recovery/fall prevention. Reviewed HEP with handout provided. Reviewed relevant precautions to maximize safety during home and community mobility.     Full practice of curbs @ 6\"H, 2 x 4\"H steps c/ FWW, 12 x 6\"H steps c/ BHR, and 16% grade ramps at SBA<>Lanie.     Full practice of floor recovery x 1 from EOB, pt able to transition <> seated on floor <>tall kneeling<>seated on bed at Lanie<>Boni.     Patient reports understanding and agreement c/ discharge plan.     Assessment:   The patient is a 58 y.o. female admitted to [facility] inpatient rehabilitation 5/29/2024 with severe functional debility after L basal ganglia, L thalamic hemorrhagic CVA.     Latanya Ferrera has participated well in their inpatient rehabilitation program with functional gains as above in flowsheet. They are now appropriate for discharge to home with family support.    Tentative discharge on 06/20/24.     Patient passport completed.     Physical Therapy Problems (Active)       There are no active problems.            "

## 2024-06-19 NOTE — DISCHARGE SUMMARY
Physical Medicine & Rehabilitation Discharge Summary    Admission Date: 5/29/2024    Discharge Date: 6/20/2024    Attending Provider: Sofi Jules DO, MS    Admission Diagnosis:   Active Hospital Problems    Diagnosis     *Hemorrhagic stroke (HCC)     Leukocytosis     Primary hypertension        Discharge Diagnosis:  Active Hospital Problems    Diagnosis     *Hemorrhagic stroke (HCC)     Leukocytosis     Primary hypertension        HPI per Admission History & Physical:  Latanya Ferrera is a 59 yo female with PMH significant for tobacco use, hypertension who presented to [facility] with right arm weakness an leg weakness. Her symptoms began suddenly while at home. Initially she had facial numbness, later noticed arm weakness. She had chills in the days prior. She had been on anti-hypertensives, but had not been taking them for the past couple of months and had not been monitoring her BP. Her BP in ED was in 200's. She had leukocytosis at 12.9. Imaging revealed left intracranial hemorrhage in the L thalamus. This was compatible with hypertensive hemorrhage. No underlying mass or vascular malformations seen. Neurology and NSGY consulted. No neurosurgical intervention after stable repeat imaging. Her hospital course c/b uncontrolled BP now on 3 agents, urinary retention now with otero, leukocytosis now on Rocephin. Code stroke was called 5/28 but evaluation did not reveal anything new.      Deemed appropriate for IPR and admitted 5/29/2024. On admission patient reports she is doing ok. Has not had BM in a couple of days. Is using IDFC for bladder management has paresthesias of her R side and feels numb. Arm and leg feel equally weak. Wants to make sure we are giving her anti-hypertensives. Dtr and mother at bedside. Speech is slurred.     Patient was admitted to [facility] on 5/29/2024.     Hospital Course by Problem List:  Left Basal Ganglia ICH secondary to hypertensive emergency  Right  Hemiparesis  PT and OT for mobility and ADLs. Per guidelines, 15 hours per week between PT, OT and/or SLP.  Follow-up Neurology (Dr. Babb)  Secondary stroke ppx: Anti-hypertensives     6/12 BP finally controlled and < 140 SBP     FACE TO FACE: Patient is wheelchair confined when out of bed. Requires manual wheelchair to meet mobility needs. Patient has been trained on use and demonstrated ability to use. Have verified ability to use within the home.      FACE TO FACE: Right AFO needed due to right hemiparesis and right foot drop.     Headache  Neck Pain  6/3 VSS. No other symptoms  6/4 Resolved. Tylenol relieved her symptoms. No change in neuro status.   6/5 Continues to have headache which returned last night severely. Recently started on DVT ppx. STAT CTH to ensure no expansion of bleed. CTH showing subacute ICH. D/w rads directly 6/5/2024 3:05 PM - not concerned for it being acute, looks older, but will get [facility] for formal comparison. Hold Lovenox for now to error on side of caution, recent US BLE neg for DVT. Restarted on Lovenox without issue.   6/6 Oxycodone 2.5mg daily PRN headache    I discussed the risks and benefits of using opiate medications for pain control.  I discussed the risk of addiction, potential for overdose, and respiratory depression (and the potential need for opiate antagonist therapy if this occurs).  I encouraged the patient to take this medication sparingly with the expressed goal of weaning off the medication as soon as is clinically appropriate.  I informed the patient that we are only able to provide a 7 day supply of these medications at discharge and that they will be responsible for requesting any refills needed from their primary care provider or their surgeon.  We discussed the need to safely secure these medications to prevent theft, inadvertent ingestion, or misuse.  Any unused medication should be immediately disposed of through a sanctioned medication disposal program.  We  discussed adjunctive pain medications and conservative therapies at length.      I answered the patient's questions regarding this treatment, and the patient indicated understanding and willingness to proceed.       Hypertension  Continue Hydralazine 50mg q6 hours --> 6/5 75mg every 6 hours  Continue Lisinopril 40mg daily --> 6/7 60mg daily --> 6/10 Increase to 80 mg daily   Continue Amlodipine 10mg daily   6/19 BP controlled      Leukocytosis  Improving on admission but unclear cause  Continue Rocephin x 2 doses (through 5/31) - ok to d/c IV after  6/14 stable 10.8 - stays in 10's 6/18 Mild leukocytosis. Check UA and CXR. Negative.   6/19 Back in 10's. Patient likely at baseline between 10-11.      Urinary Retention  Has otero - required CIC   Resolved. Off of flomax     Chronic Cough  Secondary to years of smoking. D/w patient and daughter, nothing has changed in terms of her cough, nothing new or concerning to them.   Tessalon perls scheduled TID     Hyperphosphatemia  6/3 Resolved, Ph normal      Anemia  6/19 Stable in 10's      Neuropathic Pain  6/3 Continue Gabapentin - increase to 400mg TID --> 6/13 600mg TID--> 6/19 800mg TID     Bowel   6/19 Having regular bowel movements           DVT PROPHYLAXIS: None - Hemorrhagic stroke. Check US - negative. Per Neuro note, clear for DVT chemo-ppx once MRI completed (completed 5/24). Lovenox 40 mg SQ started 6/3. Hold Lovenox 6/5/2024 until can formally compare CTH but per d/w radiologist directly low c/f acute bleed as appears older. Reviewed personally and no acute finding on new CTH. 6/7 Restart Lovenox ppx. Stop prior to d/c home.      HOSPITALIST FOLLOWING: No     CODE STATUS: FULL CODE     DISPO: Home with minimal support     VIANCA: 6/20/24 - Requesting extension as patient making really good progress     MEDS SENT TO: [facility] [address]     DISCHARGE SPECIALIST FOLLOW UP: Neurology     Patient to scheduled follow up with their PCP within 2 weeks from discharge  from the [facility].        Functional Status at Discharge  Eating:  Modified Independent  Eating Description:  Set-up of equipment or meal/tube feeding, Insert dentures  Grooming:  Supervision, Seated  Grooming Description:  Seated in wheelchair at sink, Supervision for safety, Verbal cueing  Bathing:   (Patient deferred at this time, reporting she bathed with her daughter yesterday)  Bathing Description:     Upper Body Dressing:  Supervision  Upper Body Dressing Description:  Set-up of equipment, Supervision for safety, Verbal cueing (to don/doff pullover shirts)  Lower Body Dressing:  Contact Guard Assist  Lower Body Dressing Description:  Grab bar, Set-up of equipment, Supervision for safety, Verbal cueing (don/doff socks and pants, cues for threading R LE first when donning pants)  Discharge Location : Home  Patient Discharging with Assist of: Family   Level of Supervision Required: Intermittent Supervision  Recommended Equipment for Discharge: Front-Wheeled Walker;Shower Chair;Grab Bars by Toilet;Grab Bars in Tub / Shower;Hand Held Shower Head;Manual Wheelchair  Recommended Services Upon Discharge: Home Health Occupational Therapy  Long Term Goals Met: 2  Long Term Goals Not Met: 0  Criteria for Termination of Services: Maximum Function Achieved for Inpatient Rehabilitation  Walk:  Contact Guard Assist  Distance Walked:  300  Number of Times Distance Was Traveled:  1 (2 x 150' c/ FWW at Lanie)  Assistive Device:  None  Gait Deviation:  Ataxic, Decreased Heel Strike, Trendelenberg (overshoot on RLE swing, B hip weakness impacting stance stability)  Wheelchair:  Supervised  Distance Propelled:  231   Wheelchair Description:  Extra time  Stairs Supervised  Stairs Description Extra time, Hand rails, Supervision for safety, Requires incidental assist, Walker (CGA c/ 2 steps and FWW)     Comprehension:  Modified Independent  Comprehension Description:  Glasses  Expression:  Independent  Expression  Description:     Social Interaction:  Independent  Social Interaction Description:     Problem Solving:  Supervision  Problem Solving Description:  Verbal cueing, Therapy schedule, Bed/chair alarm  Memory:  Minimal Assist  Memory Description:  Verbal cueing, Therapy schedule, Bed/chair alarm, Increased time       ISofi D.O., personally performed a complete drug regimen review and no potential clinically significant medication issues were identified.   Discharge Medication:     Medication List        START taking these medications        Instructions   amLODIPine 10 MG Tabs  Start taking on: June 20, 2024  Commonly known as: Norvasc   Take 1 Tablet by mouth every day.  Dose: 10 mg     benzonatate 100 MG Caps  Commonly known as: Tessalon   Take 1 Capsule by mouth 3 times a day.  Dose: 100 mg     gabapentin 400 MG Caps  Commonly known as: Neurontin   Take 2 Capsules by mouth 3 times a day.  Dose: 800 mg     hydrALAZINE 25 MG Tabs  Commonly known as: Apresoline   Take 3 Tablets by mouth 4 times a day.  Dose: 75 mg     lisinopril 40 MG tablet  Start taking on: June 20, 2024  Commonly known as: Prinivil   Take 2 Tablets by mouth every day.  Dose: 80 mg              Discharge Diet:  Current Diet Order   Procedures    Diet Order Diet: Regular       Discharge Activity:  Per PT/OT    Disposition:  Patient to discharge home with family support and community resources.    Equipment:  Per PT/OT    Follow-up & Discharge Instructions:  Follow up with your primary care provider (PCP) within 7-10 days of discharge to review your medications and take over your care.     If you develop chest pain, fever, chills, change in neurologic function (weakness, sensation changes, vision changes), or other concerning sxs, seek immediate medical attention or call 911.      Future Appointments   Date Time Provider Department Center   6/19/2024 12:30 PM Christina Fisher, PT [facility] None   6/19/2024  1:30 PM Donna Newman MS,OTR/L  [facility] None   6/19/2024  3:00 PM Luc Merchant, PT [facility] None   6/19/2024  3:30 PM Re Casiano, MSPT [facility] None       Condition on Discharge:  Good    More than 35 minutes was spent on discharging this patient, including face-to-face time, prescription management, and the dictation of this note.    Dr. Sofi Jules DO, MS  Department of Physical Medicine & Rehabilitation    Date of Service: 6/20/2024

## 2024-06-19 NOTE — THERAPY
Physical Therapy   Daily Treatment     Patient Name: Latanya Ferrera  Age:  58 y.o., Sex:  female  Medical Record #: [id number]  Today's Date: 6/19/2024     Precautions  Precautions: Fall Risk  Comments: R hemiplegia    Subjective    Patient is feeling well, no complaints of headaches and willing to go over HEP and other patient education      Objective       06/19/24 1501   PT Charge Group   PT Therapeutic Exercise (Units) 1   PT Therapeutic Activities (Units) 1   PT Total Time Spent   PT Individual Total Time Spent (Mins) 30   Gait Functional Level of Assist    Gait Level Of Assist Standby Assist   Assistive Device Front Wheel Walker   Distance (Feet) 200   # of Times Distance was Traveled 1   Deviation Ataxic;Other (Comment)  (overstrides right LE, increased right swing phase time as patient works hard to create active dorsiflexion and proper right foot placement at initial contact)   Transfer Functional Level of Assist   Bed, Chair, Wheelchair Transfer Supervised   Standing Lower Body Exercises   Comments Access Code: [email or web address] URL: [email or web address] Date: 06/19/2024 Prepared by: Boris Stephens Exercises - Side Stepping with Counter Support - 1 x daily - 7 x weekly - 3 sets - 10 reps - Backward Walking with Counter Support - 1 x daily - 7 x weekly - 3 sets - 10 reps - Side Step Overs with Cones and Counter Support - 1 x daily - 7 x weekly - 3 sets - 10 reps - Single Leg Balance in March Position - 1 x daily - 7 x weekly - 3 sets - 5 reps - Sit to Stand - 1 x daily - 7 x weekly - 3 sets - 8 reps - Heel Toe Raises with Unilateral Counter Support - 1 x daily - 7 x weekly - 3 sets - 10 reps   Bed Mobility    Supine to Sit Modified Independent   Sit to Supine Modified Independent   Sit to Stand Supervised   Scooting Modified Independent   Rolling Modified Independent   Neuro-Muscular Treatments   Comments see note   Interdisciplinary Plan of Care Collaboration   IDT Collaboration with  Physical Therapist   Patient  "Position at End of Therapy Seated;Chair Alarm On  (handoff to PT colleague)   Collaboration Comments CLOF and treatment plan     Practiced strategies for picking up objects from the floor. Technique of holding onto solid surface with 1 UE with wide DEMI and picking up object with other UE. This to be done with smaller objects. Practiced a hip hinge/squat technique for larger objects such as laundry basket     Practiced HEP and gave several tips for focusing on quality of movement and avoidance of compensatory spinal lateral flexion or genu recurvatum     Assessment    Patient has done well with recovery thus far and will DC home with HHPT initially.    Strengths: Able to follow instructions, Alert and oriented, Independent prior level of function, Motivated for self care and independence, Pleasant and cooperative, Supportive family, Willingly participates in therapeutic activities  Barriers: Decreased endurance, Difficulty following instructions, Fatigue, Hemiplegia, Home accessibility, Hypertension, Impaired balance, Impaired carryover of learning, Limited mobility (lives alone, decreased knowledge of condition, limited daytime family support)    Plan    DC home 6/20/24    DME  PT DME Recommendations  Wheelchair: 18\" Width, Jaciel Height (16\"-17\"), Lightweight, Removable/Flip Back Armrests, Standard Leg Rests, Anti-tippers  Cushion: Standard  Assistive Device: Front Wheeled Walker  Additional Equipment: Other (Comments) (R AFO)        Physical Therapy Problems (Active)       Problem: PT-Long Term Goals       Dates: Start:  05/30/24         Goal: LTG-By discharge, patient will transfer one surface to another c/ Boni.        Dates: Start:  05/30/24            Goal: LTG-By discharge, patient will ambulate up/down 2 stairs c/ CGA or better to access home.        Dates: Start:  05/30/24              "

## 2024-06-20 ENCOUNTER — APPOINTMENT (OUTPATIENT)
Dept: PHYSICAL THERAPY | Facility: REHABILITATION | Age: 59
End: 2024-06-20
Attending: PHYSICAL MEDICINE & REHABILITATION
Payer: MEDICAID

## 2024-06-20 VITALS
HEART RATE: 72 BPM | DIASTOLIC BLOOD PRESSURE: 60 MMHG | TEMPERATURE: 98.1 F | SYSTOLIC BLOOD PRESSURE: 138 MMHG | HEIGHT: 66 IN | RESPIRATION RATE: 18 BRPM | BODY MASS INDEX: 28.93 KG/M2 | WEIGHT: 180 LBS | OXYGEN SATURATION: 97 %

## 2024-06-20 PROBLEM — Z78.9 IMPAIRED MOBILITY AND ADLS: Status: ACTIVE | Noted: 2024-06-20

## 2024-06-20 PROBLEM — Z74.09 IMPAIRED MOBILITY AND ADLS: Status: ACTIVE | Noted: 2024-06-20

## 2024-06-20 PROCEDURE — 700102 HCHG RX REV CODE 250 W/ 637 OVERRIDE(OP): Performed by: PHYSICAL MEDICINE & REHABILITATION

## 2024-06-20 PROCEDURE — A9270 NON-COVERED ITEM OR SERVICE: HCPCS | Performed by: PHYSICAL MEDICINE & REHABILITATION

## 2024-06-20 PROCEDURE — 97116 GAIT TRAINING THERAPY: CPT

## 2024-06-20 PROCEDURE — 99239 HOSP IP/OBS DSCHRG MGMT >30: CPT | Performed by: PHYSICAL MEDICINE & REHABILITATION

## 2024-06-20 PROCEDURE — 97530 THERAPEUTIC ACTIVITIES: CPT

## 2024-06-20 RX ORDER — OXYCODONE HYDROCHLORIDE 5 MG/1
2.5 TABLET ORAL
Qty: 4 TABLET | Refills: 0 | Status: SHIPPED | OUTPATIENT
Start: 2024-06-20 | End: 2024-06-27

## 2024-06-20 RX ADMIN — Medication 5000 UNITS: at 07:55

## 2024-06-20 RX ADMIN — GABAPENTIN 800 MG: 400 CAPSULE ORAL at 07:55

## 2024-06-20 RX ADMIN — LISINOPRIL 80 MG: 20 TABLET ORAL at 04:51

## 2024-06-20 RX ADMIN — OXYCODONE 2.5 MG: 5 TABLET ORAL at 04:54

## 2024-06-20 RX ADMIN — HYDRALAZINE HYDROCHLORIDE 75 MG: 50 TABLET ORAL at 07:55

## 2024-06-20 RX ADMIN — ACETAMINOPHEN 500 MG: 500 TABLET, FILM COATED ORAL at 04:51

## 2024-06-20 RX ADMIN — AMLODIPINE BESYLATE 10 MG: 5 TABLET ORAL at 04:51

## 2024-06-20 RX ADMIN — OMEPRAZOLE 20 MG: 20 CAPSULE, DELAYED RELEASE ORAL at 07:55

## 2024-06-20 RX ADMIN — BENZONATATE 100 MG: 100 CAPSULE ORAL at 07:55

## 2024-06-20 ASSESSMENT — PATIENT HEALTH QUESTIONNAIRE - PHQ9
2. FEELING DOWN, DEPRESSED, IRRITABLE, OR HOPELESS: NOT AT ALL
1. LITTLE INTEREST OR PLEASURE IN DOING THINGS: NOT AT ALL
SUM OF ALL RESPONSES TO PHQ9 QUESTIONS 1 AND 2: 0
1. LITTLE INTEREST OR PLEASURE IN DOING THINGS: NOT AT ALL
2. FEELING DOWN, DEPRESSED, IRRITABLE, OR HOPELESS: NOT AT ALL
SUM OF ALL RESPONSES TO PHQ9 QUESTIONS 1 AND 2: 0

## 2024-06-20 ASSESSMENT — GAIT ASSESSMENTS
GAIT LEVEL OF ASSIST: STANDBY ASSIST
DISTANCE (FEET): 200
DEVIATION: ATAXIC;DECREASED HEEL STRIKE;DECREASED TOE OFF
ASSISTIVE DEVICE: FRONT WHEEL WALKER

## 2024-06-20 ASSESSMENT — ACTIVITIES OF DAILY LIVING (ADL): BED_CHAIR_WHEELCHAIR_TRANSFER_DESCRIPTION: SUPERVISION FOR SAFETY

## 2024-06-20 NOTE — PROGRESS NOTES
Patient care assumed. Report received from [facility] CESAR De Los Santos. Patient is alert and calm, resting in bed. Call light and bedside table within reach. Will continue to monitor.

## 2024-06-20 NOTE — PROGRESS NOTES
Patient discharged to home per order.  Discharge instructions reviewed with patient and daughter, Alcira; they verbalize understanding and signed copies placed in chart.  Patient has all belongings; signed copy of form in chart.  Patient left facility at 1030 via w/c accompanied by rehab staff and Alcira.  Have enjoyed working with this pleasant patient.

## 2024-06-20 NOTE — DISCHARGE PLANNING
Met with patient as they were discharging. Discussed with family needing to call [employer] when they get home to set up delivery of w/c cushion, leg rests and possible fww if approved. Also went over not being able to find a home health company that takes her insurance. They would like to have outpatient therapy with [facility]. This CM messaged MD for referral placement.

## 2024-06-20 NOTE — PROGRESS NOTES
NURSING DAILY NOTE    Name: Latanya Ferrera   Date of Admission: 5/29/2024   Admitting Diagnosis: Hemorrhagic stroke (HCC)  Attending Physician: Sofi Jules D.o.  Allergies: Pineapple    Safety  Patient Assist  Stand By Assist  Patient Precautions  Fall Risk  Precaution Comments  R hemiplegia  Bed Transfer Status  Supervised  Toilet Transfer Status   Minimal Assist (for LOB x 1 c/ STS from toilet)  Assistive Devices  Rails, Wheelchair  Oxygen  None - Room Air  Diet/Therapeutic Dining  Current Diet Order   Procedures    Diet Order Diet: Regular     Pill Administration  whole  Agitated Behavioral Scale     ABS Level of Severity       Fall Risk  Has the patient had a fall this admission?   No  Carly Fisher Fall Risk Scoring  11, MODERATE RISK  Fall Risk Safety Measures  bed alarm and chair alarm    Vitals  Temperature: 36.6 °C (97.8 °F)  Temp src: Oral  Pulse: 88  Respiration: 18  Blood Pressure: 138/66  Blood Pressure MAP (Calculated): 90 MM HG  BP Location: Left, Upper Arm  Patient BP Position: Jolley's Position     Oxygen  Pulse Oximetry: 94 %  O2 (LPM): 0  O2 Delivery Device: None - Room Air    Bowel and Bladder  Last Bowel Movement  06/18/24 (per patient)  Stool Type  Type 4: Like a sausage or snake, smooth and soft  Bowel Device  Bathroom  Continent  Bladder: Continent void   Bowel: Continent movement  Bladder Function  Urine Void (mL):  (large)  Number of Times Voided: 1  Urinary Options: Yes  Urine Color: Unable To Evaluate  Genitourinary Assessment   Bladder Assessment (WDL):  Within Defined Limits  Ramirez Catheter: Not Applicable  Ramirez Reasons per MD Order: Acute urinary retention or bladder outlet obstruction  Ramirez Care: Given with Soap and Water  Urinary Elimination: Catheter (Document on LDA)  Urine Color: Unable To Evaluate  Bladder Device: Bathroom  Bladder Scan: Post Void  $ Bladder Scan Results (mL): 100  Bladder Medications:  Yes    Skin  Xavier Score   18  Sensory Interventions   Bed Types: Standard/Trauma Mattress  Skin Preventative Measures: Waffle Overlay  Moisture Interventions  Moisturizers/Barriers: Barrier Cream  Containment Devices: Indwelling Catheter      Pain  Pain Rating Scale  3 - Sometimes distracts me  Pain Location  Head  Pain Location Orientation  Right  Pain Interventions   Medication (see MAR)    ADLs    Bathing   Shower, * * With Assistance from, Staff  Linen Change   Partial  Personal Hygiene     Chlorhexidine Bath      Oral Care  Brushed Teeth  Teeth/Dentures  Missing Teeth (Comments)  Shave     Nutrition Percentage Eaten  *  * Meal *  *, Between 50-75% Consumed  Environmental Precautions  Treaded Slipper Socks on Patient  Patient Turns/Positioning  Patient Turns Self from Side to Side  Patient Turns Assistance/Tolerance  Assistance of One  Bed Positions  Bed Locked, Bed Controls On  Head of Bed Elevated  Self regulated      Psychosocial/Neurologic Assessment  Psychosocial Assessment  Psychosocial (WDL):  Within Defined Limits  Patient Behaviors: Fatigue  Family Behaviors: No Family Present  Neurologic Assessment  Neuro (WDL): Exceptions to WDL  Level of Consciousness: Alert  Orientation Level: Oriented X4  Cognition: Follows commands, Appropriate attention/concentration  Speech: Clear  Pupil Assesment: No  R Pupil Size (mm): 3  R Pupil Shape / Description: Round  R Pupil Reaction: Brisk  L Pupil Size (mm): 3  L Pupil Shape / Description: Round  L Pupil Reaction: Brisk  Motor Function/Sensation Assessment: Sensation, Motor strength  RUE Sensation: Numbness  Muscle Strength Right Arm: Fair Strength against Gravity but No Resistance  LUE Sensation: Full sensation  Muscle Strength Left Arm: Normal Strength Against Gravity and Full Resistance  RLE Sensation: Numbness  Muscle Strength Right Leg: Fair Strength against Gravity but No Resistance  LLE Sensation: Full sensation  Muscle Strength Left Leg: Normal Strength  Against Gravity and Full Resistance  EENT (WDL):  Within Defined Limits    Cardio/Pulmonary Assessment  Edema   RLE Edema: Trace  LLE Edema: Trace  Respiratory Breath Sounds  RUL Breath Sounds: Clear  RML Breath Sounds: Clear  RLL Breath Sounds: Clear  OZIEL Breath Sounds: Clear  LLL Breath Sounds: Clear  Cardiac Assessment   Cardiac (WDL):  WDL Except

## 2024-06-21 NOTE — THERAPY
"Physical Therapy   Daily Treatment     Patient Name: Latanya Ferrera  Age:  58 y.o., Sex:  female  Medical Record #: [id number]  Today's Date: 6/20/2024     Precautions  Precautions: Fall Risk  Comments: R hemiplegia    Subjective    \"Let me go, I can do it.\" Pt in bed at arrival, daughter Lidia present for family training.     Objective       06/20/24 0901   PT Charge Group   PT Gait Training (Units) 1   PT Therapeutic Activities (Units) 4   PT Total Time Spent   PT Individual Total Time Spent (Mins) 60   Gait Functional Level of Assist    Gait Level Of Assist Standby Assist   Assistive Device Front Wheel Walker   Distance (Feet) 200  (1 x 200', 1 x 80' no device)   # of Times Distance was Traveled 2   Deviation Ataxic;Decreased Heel Strike;Decreased Toe Off   Wheelchair Functional Level of Assist   Wheelchair Assist Modified Independent   Distance Wheelchair (Feet or Distance) 150   Wheelchair Description Extra time;Supervision for safety   Stairs Functional Level of Assist   Level of Assist with Stairs Supervised   # of Stairs Climbed 12  (2 x 4\"H c/ FWW, 2 x 6\"H c/ FWW, 8 x 6\"H c/ BHR)   Stairs Description Extra time;Hand rails;Supervision for safety   Transfer Functional Level of Assist   Bed, Chair, Wheelchair Transfer Supervised   Bed Chair Wheelchair Transfer Description Supervision for safety  (stand step/stand pivot)   Bed Mobility    Supine to Sit Modified Independent   Sit to Supine Modified Independent   Sit to Stand Supervised   Scooting Modified Independent   Rolling Modified Independent   Interdisciplinary Plan of Care Collaboration   IDT Collaboration with  Family / Caregiver;Nursing   Patient Position at End of Therapy Seated;Family / Friend in Room;Call Light within Reach;Tray Table within Reach;Phone within Reach;Other (Comments)  (RN present for d/c instructions)   Collaboration Comments family training, see note     TA/Gait: demonstration and return demonstration of mobility c/ daughter providing " "hands on assist for the following  Full practice of curbs, 2 JULY c/ FWW, stairs c/ BHR  Full practice of OG gait c/ FWW and no device   Full practice of floor recovery standing<>supine   Full practice of w/c mobility on level surfaces and 16% ramps c/ retro method to ascend  W/C adjusted to improve seat to floor height for hemitechnique, BLE, LUE propulsion method  TM gait c/ harness for safety: 1 x 2'00\" at 1.7 mph, BUE on rails, no AFO, 295'  Education: progress to date, gait belt for safety, slowing down and limiting distractions while ambulating to decrease fall risk; follow up instructions for DME and AFO needs. Pt and daughter report understanding and agreement c/ recommendations.     Assessment    Pt and daughter able to demonstrate good understanding of safety techniques and d/c recommendations. No barriers to d/c.     Strengths: Able to follow instructions, Alert and oriented, Independent prior level of function, Motivated for self care and independence, Pleasant and cooperative, Supportive family, Willingly participates in therapeutic activities  Barriers: Decreased endurance, Difficulty following instructions, Fatigue, Hemiplegia, Home accessibility, Hypertension, Impaired balance, Impaired carryover of learning, Limited mobility (lives alone, decreased knowledge of condition, limited daytime family support)    Plan    D/C today    DME  PT DME Recommendations  Wheelchair: 18\" Width, Jaciel Height (16\"-17\"), Lightweight, Removable/Flip Back Armrests, Standard Leg Rests, Anti-tippers  Cushion: Standard  Assistive Device: Front Wheeled Walker  Additional Equipment: Other (Comments) (R AFO)      Physical Therapy Problems (Active)       There are no active problems.            "

## 2024-06-23 ENCOUNTER — HOSPITAL ENCOUNTER (EMERGENCY)
Facility: MEDICAL CENTER | Age: 59
End: 2024-06-23
Attending: EMERGENCY MEDICINE
Payer: MEDICAID

## 2024-06-23 ENCOUNTER — APPOINTMENT (OUTPATIENT)
Dept: RADIOLOGY | Facility: MEDICAL CENTER | Age: 59
End: 2024-06-23
Attending: EMERGENCY MEDICINE
Payer: MEDICAID

## 2024-06-23 VITALS
HEIGHT: 65 IN | SYSTOLIC BLOOD PRESSURE: 126 MMHG | BODY MASS INDEX: 29.99 KG/M2 | OXYGEN SATURATION: 93 % | HEART RATE: 79 BPM | RESPIRATION RATE: 16 BRPM | DIASTOLIC BLOOD PRESSURE: 68 MMHG | TEMPERATURE: 98.8 F | WEIGHT: 180 LBS

## 2024-06-23 DIAGNOSIS — H57.89 EYE SWELLING, BILATERAL: ICD-10-CM

## 2024-06-23 LAB
ALBUMIN SERPL BCP-MCNC: 3.6 G/DL (ref 3.2–4.9)
ALBUMIN/GLOB SERPL: 1.1 G/DL
ALP SERPL-CCNC: 102 U/L (ref 30–99)
ALT SERPL-CCNC: 34 U/L (ref 2–50)
ANION GAP SERPL CALC-SCNC: 14 MMOL/L (ref 7–16)
AST SERPL-CCNC: 12 U/L (ref 12–45)
BASOPHILS # BLD AUTO: 0.3 % (ref 0–1.8)
BASOPHILS # BLD: 0.03 K/UL (ref 0–0.12)
BILIRUB SERPL-MCNC: 0.3 MG/DL (ref 0.1–1.5)
BUN SERPL-MCNC: 9 MG/DL (ref 8–22)
CALCIUM ALBUM COR SERPL-MCNC: 9.7 MG/DL (ref 8.5–10.5)
CALCIUM SERPL-MCNC: 9.4 MG/DL (ref 8.5–10.5)
CHLORIDE SERPL-SCNC: 105 MMOL/L (ref 96–112)
CO2 SERPL-SCNC: 18 MMOL/L (ref 20–33)
CREAT SERPL-MCNC: 0.66 MG/DL (ref 0.5–1.4)
EOSINOPHIL # BLD AUTO: 0.49 K/UL (ref 0–0.51)
EOSINOPHIL NFR BLD: 4.9 % (ref 0–6.9)
ERYTHROCYTE [DISTWIDTH] IN BLOOD BY AUTOMATED COUNT: 42.8 FL (ref 35.9–50)
GFR SERPLBLD CREATININE-BSD FMLA CKD-EPI: 102 ML/MIN/1.73 M 2
GLOBULIN SER CALC-MCNC: 3.4 G/DL (ref 1.9–3.5)
GLUCOSE SERPL-MCNC: 155 MG/DL (ref 65–99)
HCT VFR BLD AUTO: 32.9 % (ref 37–47)
HGB BLD-MCNC: 10.5 G/DL (ref 12–16)
IMM GRANULOCYTES # BLD AUTO: 0.1 K/UL (ref 0–0.11)
IMM GRANULOCYTES NFR BLD AUTO: 1 % (ref 0–0.9)
LYMPHOCYTES # BLD AUTO: 1.37 K/UL (ref 1–4.8)
LYMPHOCYTES NFR BLD: 13.6 % (ref 22–41)
MCH RBC QN AUTO: 29.4 PG (ref 27–33)
MCHC RBC AUTO-ENTMCNC: 31.9 G/DL (ref 32.2–35.5)
MCV RBC AUTO: 92.2 FL (ref 81.4–97.8)
MONOCYTES # BLD AUTO: 0.76 K/UL (ref 0–0.85)
MONOCYTES NFR BLD AUTO: 7.5 % (ref 0–13.4)
NEUTROPHILS # BLD AUTO: 7.32 K/UL (ref 1.82–7.42)
NEUTROPHILS NFR BLD: 72.7 % (ref 44–72)
NRBC # BLD AUTO: 0 K/UL
NRBC BLD-RTO: 0 /100 WBC (ref 0–0.2)
PLATELET # BLD AUTO: 286 K/UL (ref 164–446)
PMV BLD AUTO: 9.7 FL (ref 9–12.9)
POTASSIUM SERPL-SCNC: 3.9 MMOL/L (ref 3.6–5.5)
PROT SERPL-MCNC: 7 G/DL (ref 6–8.2)
RBC # BLD AUTO: 3.57 M/UL (ref 4.2–5.4)
SODIUM SERPL-SCNC: 137 MMOL/L (ref 135–145)
WBC # BLD AUTO: 10.1 K/UL (ref 4.8–10.8)

## 2024-06-23 PROCEDURE — 99284 EMERGENCY DEPT VISIT MOD MDM: CPT

## 2024-06-23 PROCEDURE — 85025 COMPLETE CBC W/AUTO DIFF WBC: CPT

## 2024-06-23 PROCEDURE — 70450 CT HEAD/BRAIN W/O DYE: CPT

## 2024-06-23 PROCEDURE — 80053 COMPREHEN METABOLIC PANEL: CPT

## 2024-06-23 PROCEDURE — 36415 COLL VENOUS BLD VENIPUNCTURE: CPT

## 2024-06-23 ASSESSMENT — FIBROSIS 4 INDEX: FIB4 SCORE: 0.63

## 2024-06-23 NOTE — ED PROVIDER NOTES
ED Provider Note    CHIEF COMPLAINT  Chief Complaint   Patient presents with    Eye Swelling     BIBA from home.  Per EMS, pt states her right eye started feeling heavy and droopy when she woke up yesterday morning. Right eye appears swollen.  Pt had a stroke May 23rd and was released from [facility] on Thursday.  Pt has deficits of right sided arm and leg weakness along with tingling in right hand since that stroke. Pt denies any changes in deficits.      EXTERNAL RECORDS REVIEWED  Review of records show that the patient was just discharged from rehabilitation facility 3 days ago. She has a history of hemorrhagic stroke, history of difficulty to control blood pressure, and urinary retention.     HPI/ROS  LIMITATION TO HISTORY   Select: : None  OUTSIDE HISTORIAN(S):  None    Latanya Ferrera is a 58 y.o. female who presents to the Emergency Department with eye swelling onset two days ago. She states she woke up this morning with concern for bilateral eye swelling in addition to some redness of the left cheek. She states since her stroke, her left hand has felt tingly in addition to numbness to the left side of her face. These symptoms are unchanged.  She reports having a headache, and has alleviated this with Tylenol. She denies chest pain, trouble breathing, swelling in the legs, swelling in the lips or mouth, or any new rashes.     PAST MEDICAL HISTORY  Past Medical History:   Diagnosis Date    Hypertension     Tobacco abuse         SURGICAL HISTORY  History reviewed. No pertinent surgical history.     FAMILY HISTORY  No family history noted.     SOCIAL HISTORY   reports that she has quit smoking. Her smoking use included cigarettes. She has never used smokeless tobacco. She reports that she does not currently use alcohol. She reports that she does not use drugs.    CURRENT MEDICATIONS  Previous Medications    AMLODIPINE (NORVASC) 10 MG TAB    Take 1 Tablet by mouth every day.    BENZONATATE (TESSALON) 100 MG CAP    " Take 1 Capsule by mouth 3 times a day.    GABAPENTIN (NEURONTIN) 400 MG CAP    Take 2 Capsules by mouth 3 times a day.    HYDRALAZINE (APRESOLINE) 25 MG TAB    Take 3 Tablets by mouth 4 times a day.    LISINOPRIL (PRINIVIL) 40 MG TABLET    Take 2 Tablets by mouth every day.    OXYCODONE IMMEDIATE-RELEASE (ROXICODONE) 5 MG TAB    Take 0.5 Tablets by mouth 1 time a day as needed for Severe Pain (Headache) for up to 7 days.       ALLERGIES  Pineapple    PHYSICAL EXAM  BP (!) 147/81   Pulse 79   Temp 36.7 °C (98.1 °F) (Oral)   Resp (!) 21   Ht 1.651 m (5' 5\")   Wt 81.6 kg (180 lb)   SpO2 93%        Constitutional: Nontoxic appearing. Alert in no apparent distress.  HENT: Normocephalic, Atraumatic. Bilateral external ears normal. Nose normal.  Moist mucous membranes.  Oropharynx clear. Very mild bilateral symmetric perioribital edema.  No significant erythema or warmth  Eyes: Pupils are equal and reactive. Conjunctiva normal. EOMI without pain  Neck: Supple, full range of motion  Heart: Regular rate and rhythm.  No murmurs.    Lungs: No respiratory distress, normal work of breathing. Lungs clear to auscultation bilaterally.  Abdomen Soft, no distention.  No tenderness to palpation.  Musculoskeletal: Atraumatic. No obvious deformities noted.  No lower extremity edema.  Skin: Warm, Dry.  No erythema, No rash.   Neurologic: Alert and oriented x3. Moving all extremities spontaneously without focal deficits.  Psychiatric: Affect normal, Mood normal, Appears appropriate and not intoxicated.      DIAGNOSTIC STUDIES / PROCEDURES    LABS  Labs Reviewed   COMP METABOLIC PANEL - Abnormal; Notable for the following components:       Result Value    Co2 18 (*)     Glucose 155 (*)     Alkaline Phosphatase 102 (*)     All other components within normal limits   CBC WITH DIFFERENTIAL - Abnormal; Notable for the following components:    RBC 3.57 (*)     Hemoglobin 10.5 (*)     Hematocrit 32.9 (*)     MCHC 31.9 (*)     " Neutrophils-Polys 72.70 (*)     Lymphocytes 13.60 (*)     Immature Granulocytes 1.00 (*)     All other components within normal limits    Narrative:     SPECIMEN IS A RECOLLECT   ESTIMATED GFR       RADIOLOGY  I have independently interpreted the diagnostic imaging associated with this visit and am waiting the final reading from the radiologist.   My preliminary interpretation is as follows: no intracranial hemorrhage    Radiologist interpretation:  CT-HEAD W/O   Final Result      No CT evidence of acute infarct, hemorrhage or mass.                   COURSE & MEDICAL DECISION MAKING    1:21 PM - Patient seen and examined at bedside. Discussed plan of care, including diagnostic workup. Patient agrees to the plan of care. Ordered for CT head without, CMP  to evaluate her symptoms.         ASSESSMENT, COURSE AND PLAN  Care Narrative: Patient with recent admission for hemorrhagic stroke who presents with concern for swelling around her eyes this morning.  She is overall well appearing with reassuring vitals on arrival.  She has very minimal symmetric periorbital edema on exam.  No new neurologic deficits on exam.  No signs of periorbital or orbital cellulitis.  No oral swelling or signs of angioedema. Labs without leukocytosis to suggest infectious process. No renal dysfunction or other signs of volume overload.   Imaging was performed due to recent intracranial hemorrhage however this appears improved without new evidence of bleeding.    3:26 PM - Upon reassessment, patient is resting comfortably with normal vital signs.  No new complaints at this time.  Discussed results with patient and/or family as well as importance of primary care follow up.  Patient understands plan of care and strict return precautions for new or changing symptoms.         ADDITIONAL PROBLEM LIST  Problem #1: Bilateral eye swelling - possibly due to mild peripheral edema, exam and workup reassuring, will discharge with reassurance and outpatient  follow up      DISPOSITION AND DISCUSSIONS      Decision tools and prescription drugs considered including, but not limited to: Antibiotics no signs of bacterial infection .    The patient will return for new or worsening symptoms and is stable at the time of discharge.    DISPOSITION:  Patient will be discharged home in stable condition.    FOLLOW UP:  Latesha Logan N.P.  [address]  [address]  [facility] [id number] [phone]      as scheduled 6/27    [facility], Emergency Dept  [address] [facility] [address] [phone]    If symptoms worsen      FINAL DIAGNOSIS  1. Eye swelling, bilateral        The note accurately reflects work and decisions made by me.  Mohini Beltre M.D.  6/23/2024  10:47 PM     Naida SALDANA (Scribe), am scribing for, and in the presence of, Mohini Beltre M.D..    Electronically signed by: Naida Kent (Rodneyibe), 6/23/2024    Mohini SALDANA M.D. personally performed the services described in this documentation, as scribed by Naida Kent in my presence, and it is both accurate and complete.

## 2024-06-23 NOTE — ED NOTES
Lab called that sample hemolyzed.  New samples drawn with straight stick and sent to lab.  Pt tolerated well.

## 2024-06-23 NOTE — DISCHARGE INSTRUCTIONS
You were seen in the Emergency Department for mild swelling around the eyes.    EKG, labs, CT scan were completed without significant acute abnormalities.    Please continue home medications as directed.    Please follow up with your primary care physician as scheduled.    Return to the Emergency Department with severe headaches, increasing swelling or redness around the eyes, fevers, or other concerns.

## 2024-06-23 NOTE — ED NOTES
Pt has discharge orders. Pt educated on discharge instructions.  Pt verbalizes understanding.  Follow up appointment made with PCP. PIV removed. Pt wheeled to lobby with WC and personal walker. Pt going home with brandy.

## 2024-06-23 NOTE — ED TRIAGE NOTES
"Chief Complaint   Patient presents with    Eye Swelling     BIBA from home.  Per EMS, pt states her right eye started feeling heavy and droopy when she woke up yesterday morning. Right eye appears swollen.  Pt had a stroke May 23rd and was released from [facility] on Thursday.  Pt has deficits of right sided arm and leg weakness along with tingling in right hand since that stroke. Pt denies any changes in deficits.      BP (!) 148/80   Pulse 88   Temp 36.7 °C (98.1 °F) (Temporal)   Resp 18   Ht 1.651 m (5' 5\")   Wt 81.6 kg (180 lb)   SpO2 95%   BMI 29.95 kg/m²     Pt A&Ox4.  Pt connected to monitor. Blood sugar for EMS was 132.  Pt also appears to have swelling to left cheek.    "

## 2024-08-06 NOTE — CARE PLAN
Problem: Balance  Goal: STG-Within one week, patient will maintain static standing c/ 1UE support at SBA or better, >/= 2 minutes.   Outcome: Met     Problem: Mobility  Goal: STG-Within one week, patient will ambulate household distances >/= 50' c/ LRAD and TotalA or better.   Outcome: Met     Problem: Mobility Transfers  Goal: STG-Within one week, patient will transfer bed to chair c/ Sonia or better c/ appropriate compensatory method (reach pivot vs SPT).  Outcome: Met     
  Problem: Balance  Goal: STG-Within one week, patient will maintain static standing c/ 1UE support at SBA or better, >/= 2 minutes.   Outcome: Progressing     Problem: Mobility  Goal: STG-Within one week, patient will ambulate household distances >/= 50' c/ LRAD and TotalA or better.   Outcome: Progressing     Problem: Mobility Transfers  Goal: STG-Within one week, patient will transfer bed to chair c/ Sonia or better c/ appropriate compensatory method (reach pivot vs SPT).  Outcome: Progressing     
  Problem: Bathing  Goal: STG-Within one week, patient will bathe with MIN A and LRAD  Outcome: Met     Problem: Dressing  Goal: STG-Within one week, patient will dress LB with setup and supervised with grab bar for balance  Outcome: Met     Problem: Toileting  Goal: STG-Within one week, patient will complete toileting tasks with supervision using grab bar for balance  Outcome: Met     Problem: Functional Transfers  Goal: STG-Within one week, patient will transfer to toilet with supervision using grab bar for support  Outcome: Met     
  Problem: Bathing  Goal: STG-Within one week, patient will bathe with MIN A and LRAD  Outcome: Not Met  Note: Has refused all bathing during OT; therefore not assessed     Problem: Dressing  Goal: STG-Within one week, patient will dress LB with setup and SBA and commode over toilet  Outcome: Met  Note: SBA with grab bar     Problem: Functional Transfers  Goal: STG-Within one week, patient will transfer to toilet with SBA using grab bar and commode over toilet  Outcome: Met  Note: SBA with grab bar     
  Problem: Bathing  Goal: STG-Within one week, patient will bathe with MIN A and LRAD  Outcome: Not Met  Note: Not assessed, patient has declined bathing at rehab     Problem: Dressing  Goal: STG-Within one week, patient will dress LB with CGA and LRAD  Outcome: Met  Note: Setup and CGA with cues     Problem: Functional Transfers  Goal: STG-Within one week, patient will transfer to toilet with CGA and LRAD  Outcome: Met  Note: CGA with grab bar and commode over toilet  Goal: STG-Within one week, patient will transfer to tub/shower with CGA and LRAD  Outcome: Met  Note: CGA tub/shower with tub bench     Problem: Functional Cognition  Goal: STG-Within one week, patient will attend to R arm and incorporate into functional tasks with min cues  Outcome: Met  Note: Min cues     
  Problem: Bathing  Goal: STG-Within one week, patient will bathe with MIN A and LRAD  Outcome: Progressing     Problem: Dressing  Goal: STG-Within one week, patient will dress LB with CGA and LRAD  Outcome: Progressing     Problem: Functional Transfers  Goal: STG-Within one week, patient will transfer to toilet with CGA and LRAD  Outcome: Progressing  Goal: STG-Within one week, patient will transfer to tub/shower with CGA and LRAD  Outcome: Progressing     Problem: Functional Cognition  Goal: STG-Within one week, patient will attend to R arm and incorporate into functional tasks with min cues  Outcome: Progressing     Problem: OT Long Term Goals  Goal: LTG-By discharge, patient will complete basic self care tasks with SPV-MOD I and LRAD  Outcome: Progressing  Goal: LTG-By discharge, patient will perform bathroom transfers with SPV-MOD I and LRAD  Outcome: Progressing     
  Problem: Fall Risk - Rehab  Goal: Patient will remain free from falls  Outcome: Progressing     
  Problem: Fall Risk - Rehab  Goal: Patient will remain free from falls  Outcome: Progressing     Problem: Mobility  Goal: Patient's capacity to carry out activities will improve  Outcome: Progressing       The patient is Stable - Low risk of patient condition declining or worsening    Shift Goals  Clinical Goals: safety  Patient Goals: safety, pain control, sleep  Family Goals: no one present    
  Problem: Fall Risk - Rehab  Goal: Patient will remain free from falls  Outcome: Progressing     Problem: Mobility  Goal: Patient's capacity to carry out activities will improve  Outcome: Progressing       The patient is Stable - Low risk of patient condition declining or worsening    Shift Goals  Clinical Goals: safety, pain control  Patient Goals: safety,pain control, sleep  Family Goals: no one present    
  Problem: Fall Risk - Rehab  Goal: Patient will remain free from falls  Outcome: Progressing     Problem: Mobility  Goal: Patient's capacity to carry out activities will improve  Outcome: Progressing     Problem: Infection - Standard  Goal: Patient will remain free from infection  Outcome: Progressing       The patient is Stable - Low risk of patient condition declining or worsening    Shift Goals  Clinical Goals: Safety  Patient Goals: Rest  Family Goals: no one present    
  Problem: Fall Risk - Rehab  Goal: Patient will remain free from falls  Outcome: Progressing     Problem: Mobility  Goal: Patient's capacity to carry out activities will improve  Outcome: Progressing     Problem: Self Care  Goal: Patient will have the ability to perform ADLs independently or with assistance (bathe, groom, dress, toilet and feed)  Outcome: Progressing       The patient is Stable - Low risk of patient condition declining or worsening    Shift Goals  Clinical Goals: Safety  Patient Goals: Rest  Family Goals: no one present    
  Problem: Fall Risk - Rehab  Goal: Patient will remain free from falls  Outcome: Progressing     Problem: Mobility  Goal: Patient's capacity to carry out activities will improve  Outcome: Progressing     Problem: Self Care  Goal: Patient will have the ability to perform ADLs independently or with assistance (bathe, groom, dress, toilet and feed)  Outcome: Progressing       The patient is Stable - Low risk of patient condition declining or worsening    Shift Goals  Clinical Goals: safety  Patient Goals: safety, pain control, sleep  Family Goals: no one present    
  Problem: Fall Risk - Rehab  Goal: Patient will remain free from falls  Outcome: Progressing     Problem: Urinary Elimination  Goal: Establish and maintain regular urinary output  Outcome: Progressing     Problem: Pain - Standard  Goal: Alleviation of pain or a reduction in pain to the patient’s comfort goal  Outcome: Progressing     The patient is Stable - Low risk of patient condition declining or worsening    Shift Goals  Clinical Goals: safety  Patient Goals: sleep well    
  Problem: Fall Risk - Rehab  Goal: Patient will remain free from falls  Outcome: Progressing  Patient verbalized understanding to utilize call light for needs, requests, ambulation, and toileting.     Problem: Pain - Standard  Goal: Alleviation of pain or a reduction in pain to the patient’s comfort goal  Outcome: Progressing  Patient verbalized understanding to notify staff when in pain and to participate in their pain management regimen.     The patient is Stable - Low risk of patient condition declining or worsening    Shift Goals  Clinical Goals: Safety, pain management  Patient Goals: Pain management, sleep  Family Goals: no one present        
  Problem: Memory STGs  Goal: STG-Within one week, patient will demonstrate use of memory strategies in order to recall information from therapies after a 2/hr delay.   Outcome: Not Met  Note: Pt continues to benefit from repetitions and reinforcement for novel information     Problem: Problem Solving STGs  Goal: STG-Within one week, patient will complete alternating attention tasks with 80% accuracy given SPV  Outcome: Met     
  Problem: OT Long Term Goals  Goal: LTG-By discharge, patient will complete basic self care tasks with SPV-MOD I and LRAD  Outcome: Met  Goal: LTG-By discharge, patient will perform bathroom transfers with SPV-MOD I and LRAD  Outcome: Met     
  Problem: PT-Long Term Goals  Goal: LTG-By discharge, patient will transfer one surface to another c/ Boni.   Outcome: Met  Goal: LTG-By discharge, patient will ambulate up/down 2 stairs c/ CGA or better to access home.   Outcome: Met     
  Problem: PT-Long Term Goals  Goal: LTG-By discharge, patient will transfer one surface to another c/ Boni.   Outcome: Progressing  Goal: LTG-By discharge, patient will ambulate up/down 2 stairs c/ CGA or better to access home.   Outcome: Progressing     Problem: PT-Long Term Goals  Goal: LTG-By discharge, patient will ambulate >/= 300' c/ LRAD and CGA or better.   Outcome: Met  Goal: LTG-By discharge, patient will transfer in/out of a car c/ CGA or better.   Outcome: Met     
  Problem: Problem Solving STGs  Goal: STG-Within one week, patient will complete alternating attention tasks with 80% accuracy given SPV  Outcome: Not Met  Note: Will continue to target.      Problem: Memory STGs  Goal: STG-Within one week, patient will demonstrate use of memory strategies in order to recall information from therapies after a 2/hr delay.   Outcome: Not Met  Note: Will continue to target.      Problem: Problem Solving STGs  Goal: STG-Within one week, patient will complete medication management tasks with 80% accuracy, given min verbal cues.   Outcome: Met     
"  Problem: Fall Risk - Rehab  Goal: Patient will remain free from falls  5/29/2024 [id number] by Mirna Purcell R.N.  Note: Carly Fisher Fall risk Assessment Score: 15  High fall risk Interventions   - Alarming seatbelt  - Bed and strip alarm   - Yellow sign by the door   - Yellow wrist band \"Fall risk\"  - Room near to the nurse station  - Do not leave patient unattended in the bathroom  - Fall risk education provided       Problem: Urinary Elimination  Goal: Establish and maintain regular urinary output  Note: Pt has otero catheter in place which is draining adequate amount of urine.Cleansed with soap and water.Will continue to monitor.     Problem: Pain - Standard  Goal: Alleviation of pain or a reduction in pain to the patient’s comfort goal  Note: Medicated per request for headache.Repositioned with pillows for comfort.Will continue to monitor and assess pain level and medicate as needed.         "
"  Problem: Fall Risk - Rehab  Goal: Patient will remain free from falls  Outcome: Progressing   Carly Fisher Fall risk Assessment Score: 15    High fall risk Interventions   - Alarming seatbelt  - Wander guard  - Bed and strip alarm   - Yellow sign by the door   - Yellow wrist band \"Fall risk\"  - Room near to the nurse station  - Do not leave patient unattended in the bathroom  - Fall risk education provided       Problem: Pain - Standard  Goal: Alleviation of pain or a reduction in pain to the patient’s comfort goal  Outcome: Progressing   Patient is able to rate pain on a scale of 1-10.       "
"  Problem: Knowledge Deficit - Standard  Goal: Patient and family/care givers will demonstrate understanding of plan of care, disease process/condition, diagnostic tests and medications  Outcome: Progressing   Patient verbalized understanding plan of care.         Problem: Fall Risk - Rehab  Goal: Patient will remain free from falls  Outcome: Progressing   Carly Fisher Fall risk Assessment Score:     Moderate fall risk Interventions  - Bed and strip alarm   - Yellow sign by the door   - Yellow wrist band \"Fall risk\"  - Room near to the nurse station  - Do not leave patient unattended in the bathroom  - Fall risk education provided               "
"The patient is Stable - Low risk of patient condition declining or worsening    Shift Goals  Clinical Goals: Safety  Patient Goals: Rest  Family Goals: no one present    Progress made toward(s) clinical / shift goals:    Problem: Fall Risk - Rehab  Goal: Patient will remain free from falls  Outcome: Progressing       Carly Fisher Fall risk Assessment : 15    High fall risk Interventions   - Bed and strip alarm   - Yellow sign by the door   - Yellow wrist band \"Fall risk\"  - Room near to the nurse station  - Do not leave patient unattended in the bathroom  - Fall risk education provided    Pt uses call light consistently and appropriately. Waits for assistance does not attempt self transfer this shift. Able to verbalize needs.        "
"The patient is Stable - Low risk of patient condition declining or worsening    Shift Goals  Clinical Goals: safety  Patient Goals: safety, pain control, sleep    Problem: Fall Risk - Rehab  Goal: Patient will remain free from falls  Outcome: Progressing  Note: Carly Fisher Fall risk Assessment Score: 16    High fall risk Interventions   - Alarming seatbelt  - Bed and strip alarm   - Yellow sign by the door   - Yellow wrist band \"Fall risk\"  - Room near to the nurse station  - Do not leave patient unattended in the bathroom  - Fall risk education provided     Problem: Skin Integrity  Goal: Skin integrity is maintained or improved  Outcome: Progressing     Problem: Infection - Standard  Goal: Patient will remain free from infection  Outcome: Progressing     Problem: Pain - Standard  Goal: Alleviation of pain or a reduction in pain to the patient’s comfort goal  Outcome: Progressing     "
"The patient is Stable - Low risk of patient condition declining or worsening    Shift Goals  Clinical Goals: safety  Patient Goals: safety, pain control, sleep  Family Goals: no one present      Problem: Fall Risk - Rehab  Goal: Patient will remain free from falls  Outcome: Progressing  Note: Carly Fisher Fall risk Assessment Score: 15    High fall risk Interventions   - Alarming seatbelt  - Bed and strip alarm   - Yellow sign by the door   - Yellow wrist band \"Fall risk\"  - Room near to the nurse station  - Do not leave patient unattended in the bathroom  - Fall risk education provided       Problem: Skin Integrity  Goal: Skin integrity is maintained or improved  Outcome: Progressing     Problem: Mobility  Goal: Patient's capacity to carry out activities will improve  Outcome: Progressing     Problem: Pain - Standard  Goal: Alleviation of pain or a reduction in pain to the patient’s comfort goal  Outcome: Progressing     "
"The patient is Stable - Low risk of patient condition declining or worsening    Shift Goals  Clinical Goals: safety  Patient Goals: safety, pain control, sleep  Family Goals: no one present      Problem: Fall Risk - Rehab  Goal: Patient will remain free from falls  Outcome: Progressing  Note: Carly Fisher Fall risk Assessment Score: 15    High fall risk Interventions   - Alarming seatbelt  - Bed and strip alarm   - Yellow sign by the door   - Yellow wrist band \"Fall risk\"  - Room near to the nurse station  - Do not leave patient unattended in the bathroom  - Fall risk education provided       Problem: Skin Integrity  Goal: Skin integrity is maintained or improved  Outcome: Progressing     Problem: Pain - Standard  Goal: Alleviation of pain or a reduction in pain to the patient’s comfort goal  Outcome: Progressing     "
"The patient is Stable - Low risk of patient condition declining or worsening    Shift Goals  Clinical Goals: safety  Patient Goals: safety, pain control, sleep  Family Goals: no one present      Problem: Fall Risk - Rehab  Goal: Patient will remain free from falls  Outcome: Progressing  Note: Carly Fisher Fall risk Assessment Score: 15    High fall risk Interventions   - Alarming seatbelt  - Bed and strip alarm   - Yellow sign by the door   - Yellow wrist band \"Fall risk\"  - Room near to the nurse station  - Do not leave patient unattended in the bathroom  - Fall risk education provided     Problem: Skin Integrity  Goal: Skin integrity is maintained or improved  Outcome: Progressing     Problem: Mobility  Goal: Patient's capacity to carry out activities will improve  Outcome: Progressing     Problem: Infection - Standard  Goal: Patient will remain free from infection  Outcome: Progressing     Problem: Pain - Standard  Goal: Alleviation of pain or a reduction in pain to the patient’s comfort goal  Outcome: Progressing     "
"The patient is Stable - Low risk of patient condition declining or worsening    Shift Goals  Clinical Goals: safety  Patient Goals: safety, pain control, sleep  Family Goals: no one present    Problem: Fall Risk - Rehab  Goal: Patient will remain free from falls  Outcome: Progressing  Note: Carly Fisher Fall risk Assessment Score: 15    High fall risk Interventions   - Alarming seatbelt  - Bed and strip alarm   - Yellow sign by the door   - Yellow wrist band \"Fall risk\"  - Room near to the nurse station  - Do not leave patient unattended in the bathroom  - Fall risk education provided       Problem: Skin Integrity  Goal: Skin integrity is maintained or improved  Outcome: Progressing     Problem: Infection - Standard  Goal: Patient will remain free from infection  Outcome: Progressing     Problem: Pain - Standard  Goal: Alleviation of pain or a reduction in pain to the patient’s comfort goal  Outcome: Progressing     "
"The patient is Stable - Low risk of patient condition declining or worsening    Shift Goals  Clinical Goals: safety  Patient Goals: safety, pain control, sleep  Family Goals: no one present    Problem: Fall Risk - Rehab  Goal: Patient will remain free from falls  Outcome: Progressing  Note: Carly Fisher Fall risk Assessment Score: 15    High fall risk Interventions   - Alarming seatbelt  - Bed and strip alarm   - Yellow sign by the door   - Yellow wrist band \"Fall risk\"  - Room near to the nurse station  - Do not leave patient unattended in the bathroom  - Fall risk education provided       Problem: Skin Integrity  Goal: Skin integrity is maintained or improved  Outcome: Progressing     Problem: Pain - Standard  Goal: Alleviation of pain or a reduction in pain to the patient’s comfort goal  Outcome: Progressing     "
"The patient is Stable - Low risk of patient condition declining or worsening    Shift Goals  Clinical Goals: safety  Patient Goals: safety, pain management    Problem: Fall Risk - Rehab  Goal: Patient will remain free from falls  Outcome: Progressing  Note: Carly Fisher Fall risk Assessment Score: 16    High fall risk Interventions   - Alarming seatbelt  - Bed and strip alarm   - Yellow sign by the door   - Yellow wrist band \"Fall risk\"  - Room near to the nurse station  - Do not leave patient unattended in the bathroom  - Fall risk education provided     Problem: Skin Integrity  Goal: Skin integrity is maintained or improved  Outcome: Progressing     Problem: Infection - Standard  Goal: Patient will remain free from infection  Outcome: Progressing     Problem: Pain - Standard  Goal: Alleviation of pain or a reduction in pain to the patient’s comfort goal  Outcome: Progressing     "
"The patient is Stable - Low risk of patient condition declining or worsening    Shift Goals  Clinical Goals: safety  Patient Goals: safety, pain management, sleep      Problem: Fall Risk - Rehab  Goal: Patient will remain free from falls  Outcome: Progressing  Note: Carly Fisher Fall risk Assessment Score: 16    High fall risk Interventions   - Alarming seatbelt  - Bed and strip alarm   - Yellow sign by the door   - Yellow wrist band \"Fall risk\"  - Room near to the nurse station  - Do not leave patient unattended in the bathroom  - Fall risk education provided     Problem: Skin Integrity  Goal: Skin integrity is maintained or improved  Outcome: Progressing     Problem: Urinary Elimination  Goal: Establish and maintain regular urinary output  Outcome: Progressing  Note: Patient with indwelling catheter in place draining adequate amounts of clear yellow urine to gravity; skin intact; no leakage.  Denies flank pain or dysuria; afebrile.  Continue to monitor.     Problem: Infection - Standard  Goal: Patient will remain free from infection  Outcome: Progressing     Problem: Pain - Standard  Goal: Alleviation of pain or a reduction in pain to the patient’s comfort goal  Outcome: Progressing     "
"The patient is Stable - Low risk of patient condition declining or worsening    Shift Goals  Clinical Goals: safety  Patient Goals: safety, pain management, sleep  Family Goals: no one present    Problem: Fall Risk - Rehab  Goal: Patient will remain free from falls  Outcome: Progressing  Note: Carly Fisher Fall risk Assessment Score: 16    High fall risk Interventions   - Alarming seatbelt    - Bed and strip alarm   - Yellow sign by the door   - Yellow wrist band \"Fall risk\"  - Room near to the nurse station  - Do not leave patient unattended in the bathroom  - Fall risk education provided     Problem: Skin Integrity  Goal: Skin integrity is maintained or improved  Outcome: Progressing     Problem: Infection - Standard  Goal: Patient will remain free from infection  Outcome: Progressing     Problem: Pain - Standard  Goal: Alleviation of pain or a reduction in pain to the patient’s comfort goal  Outcome: Progressing     "
"The patient is Stable - Low risk of patient condition declining or worsening    Shift Goals  Clinical Goals: safety  Patient Goals: safety, sleep    Problem: Fall Risk - Rehab  Goal: Patient will remain free from falls  Outcome: Progressing  Note: Carly Fisher Fall risk Assessment Score: 16    High fall risk Interventions   - Alarming seatbelt  - Bed and strip alarm   - Yellow sign by the door   - Yellow wrist band \"Fall risk\"  - Room near to the nurse station  - Do not leave patient unattended in the bathroom  - Fall risk education provided     Problem: Skin Integrity  Goal: Skin integrity is maintained or improved  Outcome: Progressing     Problem: Urinary Elimination  Goal: Establish and maintain regular urinary output  Outcome: Progressing  Note: Patient with indwelling catheter in place draining adequate amounts of clear yellow urine to gravity; skin intact; no leakage.  Denies flank pain or dysuria; afebrile. Continue to monitor.     Problem: Infection - Standard  Goal: Patient will remain free from infection  Outcome: Progressing     "
"The patient is Stable - Low risk of patient condition declining or worsening    Shift Goals  Clinical Goals: safety  Patient Goals: safety, sleep    Progress made toward(s) clinical / shift goals:    Problem: Fall Risk - Rehab  Goal: Patient will remain free from falls  Outcome: Progressing       Carly Fisher Fall risk Assessment : 16    High fall risk Interventions   - Bed and strip alarm   - Yellow sign by the door   - Yellow wrist band \"Fall risk\"  - Room near to the nurse station  - Do not leave patient unattended in the bathroom  - Fall risk education provided    Pt uses call light consistently and appropriately. Waits for assistance does not attempt self transfer this shift. Able to verbalize needs.  "
"The patient is Stable - Low risk of patient condition declining or worsening    Shift Goals  Clinical Goals: safety  Patient Goals: safety, sleep, pain control  Family Goals: no one present    Problem: Fall Risk - Rehab  Goal: Patient will remain free from falls  Outcome: Progressing  Note: Carly Fisher Fall risk Assessment Score: 15    High fall risk Interventions   - Alarming seatbelt  - Bed and strip alarm   - Yellow sign by the door   - Yellow wrist band \"Fall risk\"  - Room near to the nurse station  - Do not leave patient unattended in the bathroom  - Fall risk education provided     Problem: Skin Integrity  Goal: Skin integrity is maintained or improved  Outcome: Progressing     Problem: Pain - Standard  Goal: Alleviation of pain or a reduction in pain to the patient’s comfort goal  Outcome: Progressing     "
"The patient is Stable - Low risk of patient condition declining or worsening    Shift Goals  Clinical Goals: safety, pain control  Patient Goals: safety,pain control, sleep  Family Goals: no one present    Problem: Fall Risk - Rehab  Goal: Patient will remain free from falls  Outcome: Progressing  Note: Carly Fisher Fall risk Assessment Score: 15    High fall risk Interventions   - Alarming seatbelt  - Bed and strip alarm   - Yellow sign by the door   - Yellow wrist band \"Fall risk\"  - Room near to the nurse station  - Do not leave patient unattended in the bathroom  - Fall risk education provided     Problem: Skin Integrity  Goal: Skin integrity is maintained or improved  Outcome: Progressing     Problem: Infection - Standard  Goal: Patient will remain free from infection  Outcome: Progressing     Problem: Pain - Standard  Goal: Alleviation of pain or a reduction in pain to the patient’s comfort goal  Outcome: Progressing     "
"The patient is Watcher - Medium risk of patient condition declining or worsening    Shift Goals  Clinical Goals: safety    Problem: Pain - Standard  Goal: Alleviation of pain or a reduction in pain to the patient’s comfort goal  Outcome: Progressing     Problem: Fall Risk - Rehab  Goal: Patient will remain free from falls  Note: Carly Fisher Fall risk Assessment Score: 15    High fall risk Interventions   - Bed and strip alarm   - Yellow sign by the door   - Yellow wrist band \"Fall risk\"  - Room near to the nurse station  - Do not leave patient unattended in the bathroom  - Fall risk education provided     "
The patient is Stable - Low risk of patient condition declining or worsening    Problem: Knowledge Deficit - Standard  Goal: Patient and family/care givers will demonstrate understanding of plan of care, disease process/condition, diagnostic tests and medications  Outcome: Progressing. Reviewed POC, all questions answered.        Problem: Fall Risk - Rehab  Goal: Patient will remain free from falls  Outcome: Progressing. . Call light within reach, pt educated to use for assistance for safe transferring.          Shift Goals  Clinical Goals: Safety  Patient Goals: Participate in therapy        
The patient is Stable - Low risk of patient condition declining or worsening    Problem: Knowledge Deficit - Standard  Goal: Patient and family/care givers will demonstrate understanding of plan of care, disease process/condition, diagnostic tests and medications  Outcome: Progressing. Reviewed POC, all questions answered.        Problem: Fall Risk - Rehab  Goal: Patient will remain free from falls  Outcome: Progressing. Call light within reach, pt educated to use for assistance for safe transferring.         Shift Goals  Clinical Goals: Safety  Patient Goals: Partcipate in therapy      
The patient is Stable - Low risk of patient condition declining or worsening    Problem: Knowledge Deficit - Standard  Goal: Patient and family/care givers will demonstrate understanding of plan of care, disease process/condition, diagnostic tests and medications  Outcome: Progressing. Reviewed POC, all questions answered.        Problem: Fall Risk - Rehab  Goal: Patient will remain free from falls  Outcome: Progressing. Call light within reach, pt educated to use for assistance for safe transferring.        Shift Goals  Clinical Goals: Safety  Patient Goals: Participate in therapy      
The patient is Stable - Low risk of patient condition declining or worsening    Shift Goals  Clinical Goals: Pain management  Patient Goals: Rest  Family Goals: no one present    Progress made toward(s) clinical / shift goals:    Problem: Pain - Standard  Goal: Alleviation of pain or a reduction in pain to the patient’s comfort goal  Outcome: Progressing       Patient reports satisfactory pain control and decrease intensity after pharmacological pain management.      
The patient is Stable - Low risk of patient condition declining or worsening    Shift Goals  Clinical Goals: Safety  Patient Goals: Rest  Family Goals: no one present    Progress made toward(s) clinical / shift goals:    Problem: Fall Risk - Rehab  Goal: Patient will remain free from falls  Outcome: Progressing  Note: Patient is a LOW FALL RISK. Patient uses call light consistently for staff assistance. Patient has good safety awareness, no impulsivity observed.     
The patient is Stable - Low risk of patient condition declining or worsening    Shift Goals  Clinical Goals: Safety  Patient Goals: Rest  Family Goals: no one present    Progress made toward(s) clinical / shift goals:    Problem: Knowledge Deficit - Standard  Goal: Patient and family/care givers will demonstrate understanding of plan of care, disease process/condition, diagnostic tests and medications  Outcome: Progressing     Problem: Skin Integrity  Goal: Skin integrity is maintained or improved  Outcome: Progressing     Problem: Fall Risk - Rehab  Goal: Patient will remain free from falls  Outcome: Progressing  Note: Pt uses call light consistently and appropriately. Waits for assistance does not attempt self transfer this shift. Able to verbalize needs.     Problem: Psychosocial  Goal: Patient's level of anxiety will decrease  Outcome: Progressing        
The patient is Stable - Low risk of patient condition declining or worsening    Shift Goals  Clinical Goals: Safety, Neuro assessments, sleep  Patient Goals: sleep  Family Goals: no one present    Progress made toward(s) clinical / shift goals:    Problem: Pain - Standard  Goal: Alleviation of pain or a reduction in pain to the patient’s comfort goal  Outcome: Progressing  Flowsheets (Taken 6/4/2024 1900)  Pain Rating Scale (NPRS): 5  Note: Patient able to verbalize pain level and verbalize an acceptable level of pain. Pt c/o headache at end of day shift, Day RN volted on call Dr. Olguin and was order was given for pt to have Fioricet. Pt tolerated medication well POC ongoing, call light within reach.       
The patient is Stable - Low risk of patient condition declining or worsening    Shift Goals  Clinical Goals: pain control  Patient Goals: rest  Family Goals: no one present    Progress made toward(s) clinical / shift goals:  Pain is well controlled with prn tylenol and scheduled meds. Call light within reach. Will continue with hourly rounds.         
The patient is Stable - Low risk of patient condition declining or worsening    Shift Goals  Clinical Goals: safety  Patient Goals: safety, pain control, sleep  Family Goals: no one present    Progress made toward(s) clinical / shift goals:    Problem: Knowledge Deficit - Standard  Goal: Patient and family/care givers will demonstrate understanding of plan of care, disease process/condition, diagnostic tests and medications  Outcome: Progressing   Patient educated on the POC and medications administered. All questions and concerns addressed at this time   Problem: Fall Risk - Rehab  Goal: Patient will remain free from falls  Outcome: Progressing   Bed is locked and in low position. Call light and belongings within reach   Problem: Pain - Standard  Goal: Alleviation of pain or a reduction in pain to the patient’s comfort goal  Outcome: Progressing   Use of 0-10 pain scale. Patient is able to verbalize pain and discomfort appropriately. Patient c/o HA, tylenol administered.           
The patient is Stable - Low risk of patient condition declining or worsening    Shift Goals  Clinical Goals: safety  Patient Goals: safety, pain control, sleep  Family Goals: no one present    Progress made toward(s) clinical / shift goals:  2    Problem: Fall Risk - Rehab  Goal: Patient will remain free from falls  Outcome: Progressing  Note: Carly Fisher Fall risk Assessment Score: 17    High fall risk Interventions   - Bed and strip alarm   - Yellow sign by the door       - Do not leave patient unattended in the bathroom  - Fall risk education provided  Pt using call light appropriately for assist with transfers and personal needs, remains free from falls.     Problem: Communication  Goal: The ability to communicate needs accurately and effectively will improve  Outcome: Progressing: oriented x 4, able to accurately and effective communicate needs.     
The patient is Stable - Low risk of patient condition declining or worsening    Shift Goals  Clinical Goals: safety  Patient Goals: safety, pain management, sleep  Family Goals: no one present    Progress made toward(s) clinical / shift goals:  2    Problem: Fall Risk - Rehab  Goal: Patient will remain free from falls  Outcome: Progressing  Note: Carly Fisher Fall risk Assessment Score:     High fall risk Interventions   - Bed and strip alarm   - Yellow sign by the door   - Do not leave patient unattended in the bathroom  - Fall risk education provided  Pt oriented x4, uses call light appropriately for assist with transfers and personal need.s remains free from falls.    Problem: Communication  Goal: The ability to communicate needs accurately and effectively will improve  Outcome: Progressing: Pt is able to accurately and effectively communicate needs.      
The patient is Stable - Low risk of patient condition declining or worsening    Shift Goals  Clinical Goals: safety, pain mgt  Patient Goals: Pain management, sleep  Family Goals: no one present    Progress made toward(s) clinical / shift goals:  2    Problem: Urinary Elimination  Goal: Establish and maintain regular urinary output  Outcome: Progressing:Voids, ad saba, to toilet.      Problem: Bowel Elimination  Goal: Establish and maintain regular bowel function  Outcome: Progressing: Continent of lg bm today.       Patient is not progressing towards the following goals:      
The patient is Watcher - Medium risk of patient condition declining or worsening    Shift Goals  Clinical Goals: Safety  Patient Goals: Partcipate in therapy  Family Goals: no one present    Progress made toward(s) clinical / shift goals:  Problem: Knowledge Deficit - Standard  Goal: Patient and family/care givers will demonstrate understanding of plan of care, disease process/condition, diagnostic tests and medications  Outcome: Progressing     Problem: Skin Integrity  Goal: Skin integrity is maintained or improved  Outcome: Progressing     Problem: Fall Risk - Rehab  Goal: Patient will remain free from falls  Outcome: Progressing     Problem: Communication  Goal: The ability to communicate needs accurately and effectively will improve  Outcome: Progressing     Problem: Fluid Volume  Goal: Fluid volume balance will be maintained  Outcome: Progressing     
The patient is Watcher - Medium risk of patient condition declining or worsening    Shift Goals  Clinical Goals: pain control    Problem: Pain - Standard  Goal: Alleviation of pain or a reduction in pain to the patient’s comfort goal  Note: Patient is having RUE and RLE pain, prn tylenol was given, doctor made aware.     Problem: Bowel Elimination  Goal: Patient will participate in bowel management program  Note: Prn milk of magnesium was given, LBM 5/27     
Detail Level: Zone

## 2024-08-08 ENCOUNTER — APPOINTMENT (OUTPATIENT)
Dept: PHYSICAL THERAPY | Facility: REHABILITATION | Age: 59
End: 2024-08-08
Attending: PHYSICAL MEDICINE & REHABILITATION
Payer: MEDICAID

## 2024-08-12 ENCOUNTER — APPOINTMENT (OUTPATIENT)
Dept: PHYSICAL THERAPY | Facility: REHABILITATION | Age: 59
End: 2024-08-12
Attending: PHYSICAL MEDICINE & REHABILITATION
Payer: MEDICAID

## 2024-08-15 ENCOUNTER — APPOINTMENT (OUTPATIENT)
Dept: PHYSICAL THERAPY | Facility: REHABILITATION | Age: 59
End: 2024-08-15
Payer: MEDICAID

## 2024-08-16 ENCOUNTER — APPOINTMENT (OUTPATIENT)
Dept: PHYSICAL THERAPY | Facility: REHABILITATION | Age: 59
End: 2024-08-16
Attending: PHYSICAL MEDICINE & REHABILITATION
Payer: MEDICAID

## 2024-08-22 ENCOUNTER — APPOINTMENT (OUTPATIENT)
Dept: OCCUPATIONAL THERAPY | Facility: REHABILITATION | Age: 59
End: 2024-08-22
Attending: PHYSICAL MEDICINE & REHABILITATION
Payer: MEDICAID

## 2024-08-22 ENCOUNTER — APPOINTMENT (OUTPATIENT)
Dept: PHYSICAL THERAPY | Facility: REHABILITATION | Age: 59
End: 2024-08-22
Attending: PHYSICAL MEDICINE & REHABILITATION
Payer: MEDICAID

## 2024-08-29 ENCOUNTER — APPOINTMENT (OUTPATIENT)
Dept: PHYSICAL THERAPY | Facility: REHABILITATION | Age: 59
End: 2024-08-29
Payer: MEDICAID

## 2024-08-29 ENCOUNTER — APPOINTMENT (OUTPATIENT)
Dept: OCCUPATIONAL THERAPY | Facility: REHABILITATION | Age: 59
End: 2024-08-29
Attending: PHYSICAL MEDICINE & REHABILITATION
Payer: MEDICAID

## 2024-09-05 ENCOUNTER — APPOINTMENT (OUTPATIENT)
Dept: OCCUPATIONAL THERAPY | Facility: REHABILITATION | Age: 59
End: 2024-09-05
Attending: PHYSICAL MEDICINE & REHABILITATION
Payer: MEDICAID

## 2024-09-12 ENCOUNTER — APPOINTMENT (OUTPATIENT)
Dept: OCCUPATIONAL THERAPY | Facility: REHABILITATION | Age: 59
End: 2024-09-12
Attending: PHYSICAL MEDICINE & REHABILITATION
Payer: MEDICAID

## 2024-09-19 ENCOUNTER — APPOINTMENT (OUTPATIENT)
Dept: OCCUPATIONAL THERAPY | Facility: REHABILITATION | Age: 59
End: 2024-09-19
Attending: PHYSICAL MEDICINE & REHABILITATION
Payer: MEDICAID

## 2024-09-26 ENCOUNTER — APPOINTMENT (OUTPATIENT)
Dept: PHYSICAL THERAPY | Facility: REHABILITATION | Age: 59
End: 2024-09-26
Payer: MEDICAID

## 2024-09-26 ENCOUNTER — APPOINTMENT (OUTPATIENT)
Dept: OCCUPATIONAL THERAPY | Facility: REHABILITATION | Age: 59
End: 2024-09-26
Attending: PHYSICAL MEDICINE & REHABILITATION
Payer: MEDICAID

## 2024-09-27 ENCOUNTER — APPOINTMENT (OUTPATIENT)
Dept: PHYSICAL THERAPY | Facility: REHABILITATION | Age: 59
End: 2024-09-27
Attending: PHYSICAL MEDICINE & REHABILITATION
Payer: MEDICAID

## 2024-10-03 ENCOUNTER — APPOINTMENT (OUTPATIENT)
Dept: PHYSICAL THERAPY | Facility: REHABILITATION | Age: 59
End: 2024-10-03
Payer: MEDICAID

## 2024-10-03 ENCOUNTER — APPOINTMENT (OUTPATIENT)
Dept: OCCUPATIONAL THERAPY | Facility: REHABILITATION | Age: 59
End: 2024-10-03
Attending: PHYSICAL MEDICINE & REHABILITATION
Payer: MEDICAID

## 2024-10-10 ENCOUNTER — APPOINTMENT (OUTPATIENT)
Dept: OCCUPATIONAL THERAPY | Facility: REHABILITATION | Age: 59
End: 2024-10-10
Attending: PHYSICAL MEDICINE & REHABILITATION
Payer: MEDICAID

## 2024-10-10 ENCOUNTER — APPOINTMENT (OUTPATIENT)
Dept: PHYSICAL THERAPY | Facility: REHABILITATION | Age: 59
End: 2024-10-10
Payer: MEDICAID

## 2024-10-11 ENCOUNTER — APPOINTMENT (OUTPATIENT)
Dept: PHYSICAL THERAPY | Facility: REHABILITATION | Age: 59
End: 2024-10-11
Attending: PHYSICAL MEDICINE & REHABILITATION
Payer: MEDICAID

## 2024-10-17 ENCOUNTER — APPOINTMENT (OUTPATIENT)
Dept: OCCUPATIONAL THERAPY | Facility: REHABILITATION | Age: 59
End: 2024-10-17
Attending: PHYSICAL MEDICINE & REHABILITATION
Payer: MEDICAID

## 2024-10-17 ENCOUNTER — APPOINTMENT (OUTPATIENT)
Dept: PHYSICAL THERAPY | Facility: REHABILITATION | Age: 59
End: 2024-10-17
Attending: PHYSICAL MEDICINE & REHABILITATION
Payer: MEDICAID

## 2024-10-17 ENCOUNTER — APPOINTMENT (OUTPATIENT)
Dept: PHYSICAL THERAPY | Facility: REHABILITATION | Age: 59
End: 2024-10-17
Payer: MEDICAID

## 2024-10-24 ENCOUNTER — APPOINTMENT (OUTPATIENT)
Dept: PHYSICAL THERAPY | Facility: REHABILITATION | Age: 59
End: 2024-10-24
Payer: MEDICAID

## 2024-10-25 ENCOUNTER — APPOINTMENT (OUTPATIENT)
Dept: PHYSICAL THERAPY | Facility: REHABILITATION | Age: 59
End: 2024-10-25
Attending: PHYSICAL MEDICINE & REHABILITATION
Payer: MEDICAID

## 2024-10-31 ENCOUNTER — APPOINTMENT (OUTPATIENT)
Dept: PHYSICAL THERAPY | Facility: REHABILITATION | Age: 59
End: 2024-10-31
Payer: MEDICAID

## 2024-11-01 ENCOUNTER — APPOINTMENT (OUTPATIENT)
Dept: PHYSICAL THERAPY | Facility: REHABILITATION | Age: 59
End: 2024-11-01
Attending: PHYSICAL MEDICINE & REHABILITATION
Payer: MEDICAID

## 2024-11-07 ENCOUNTER — APPOINTMENT (OUTPATIENT)
Dept: PHYSICAL THERAPY | Facility: REHABILITATION | Age: 59
End: 2024-11-07
Payer: MEDICAID

## 2024-11-08 ENCOUNTER — APPOINTMENT (OUTPATIENT)
Dept: PHYSICAL THERAPY | Facility: REHABILITATION | Age: 59
End: 2024-11-08
Attending: PHYSICAL MEDICINE & REHABILITATION
Payer: MEDICAID

## 2025-05-12 ENCOUNTER — OFFICE VISIT (OUTPATIENT)
Dept: URGENT CARE | Facility: CLINIC | Age: 60
End: 2025-05-12
Payer: MEDICAID

## 2025-05-12 ENCOUNTER — HOSPITAL ENCOUNTER (OUTPATIENT)
Facility: MEDICAL CENTER | Age: 60
End: 2025-05-12
Attending: NURSE PRACTITIONER
Payer: MEDICAID

## 2025-05-12 VITALS
RESPIRATION RATE: 16 BRPM | TEMPERATURE: 98.5 F | BODY MASS INDEX: 30.62 KG/M2 | HEART RATE: 72 BPM | HEIGHT: 65 IN | DIASTOLIC BLOOD PRESSURE: 80 MMHG | SYSTOLIC BLOOD PRESSURE: 144 MMHG | OXYGEN SATURATION: 98 % | WEIGHT: 183.8 LBS

## 2025-05-12 DIAGNOSIS — R10.9 ABDOMINAL PAIN, UNSPECIFIED ABDOMINAL LOCATION: ICD-10-CM

## 2025-05-12 DIAGNOSIS — N39.0 URINARY TRACT INFECTION WITHOUT HEMATURIA, SITE UNSPECIFIED: ICD-10-CM

## 2025-05-12 LAB
APPEARANCE UR: CLEAR
BILIRUB UR STRIP-MCNC: NEGATIVE MG/DL
COLOR UR AUTO: YELLOW
GLUCOSE UR STRIP.AUTO-MCNC: NEGATIVE MG/DL
KETONES UR STRIP.AUTO-MCNC: NEGATIVE MG/DL
LEUKOCYTE ESTERASE UR QL STRIP.AUTO: NORMAL
NITRITE UR QL STRIP.AUTO: NEGATIVE
PH UR STRIP.AUTO: 6.5 [PH] (ref 5–8)
PROT UR QL STRIP: NEGATIVE MG/DL
RBC UR QL AUTO: NEGATIVE
SP GR UR STRIP.AUTO: 1.02
UROBILINOGEN UR STRIP-MCNC: NORMAL MG/DL

## 2025-05-12 PROCEDURE — 87086 URINE CULTURE/COLONY COUNT: CPT

## 2025-05-12 PROCEDURE — 3077F SYST BP >= 140 MM HG: CPT | Performed by: NURSE PRACTITIONER

## 2025-05-12 PROCEDURE — 3079F DIAST BP 80-89 MM HG: CPT | Performed by: NURSE PRACTITIONER

## 2025-05-12 PROCEDURE — 99203 OFFICE O/P NEW LOW 30 MIN: CPT | Performed by: NURSE PRACTITIONER

## 2025-05-12 PROCEDURE — 81002 URINALYSIS NONAUTO W/O SCOPE: CPT | Performed by: NURSE PRACTITIONER

## 2025-05-12 RX ORDER — CEFDINIR 300 MG/1
300 CAPSULE ORAL 2 TIMES DAILY
Qty: 14 CAPSULE | Refills: 0 | Status: SHIPPED | OUTPATIENT
Start: 2025-05-12 | End: 2025-05-19

## 2025-05-12 ASSESSMENT — ENCOUNTER SYMPTOMS
VOMITING: 0
CONSTIPATION: 0
DIARRHEA: 0
NAUSEA: 0
ABDOMINAL PAIN: 1
FEVER: 0
FLANK PAIN: 1

## 2025-05-12 ASSESSMENT — VISUAL ACUITY: OU: 1

## 2025-05-12 ASSESSMENT — FIBROSIS 4 INDEX: FIB4 SCORE: 0.76

## 2025-05-13 NOTE — PROGRESS NOTES
Subjective:     Latanya Ferrera is a 59 y.o. female who presents for RLQ Pain (Abdominal pain for 2 days )       RLQ Pain  This is a new problem. The problem has been gradually worsening. Associated symptoms include dysuria and frequency. Pertinent negatives include no constipation, diarrhea, fever, nausea or vomiting.     Ambient listening software ([employer]) used during this encounter with the consent of the patient. The following text is AI-assisted:    History of Present Illness  The patient presents with abdominal pain and suspected UTI.    She reports dyspnea, intermittent abdominal pain, and recurrent UTIs. Abdominal pain onset was today, while right flank pulling sensation has persisted for 2 weeks. Muscle relaxant prescribed by her physician did not alleviate the pulling sensation.    She suspects another UTI due to dark urine despite adequate hydration, along with urinary urgency and frequency. No vaginal symptoms. History of recurrent UTIs every 90 days, advised to wear cotton underwear. Episode of UTI 2 years ago resulted in unconsciousness and 5-day hospital stay at [facility]. UTI 6 months ago. Concerned about potential sepsis from recurrent UTIs.    No history of abdominal surgeries, vomiting, nausea, constipation, or diarrhea. Regular bowel movements and passes gas without difficulty.    Hx stroke.    Review of Systems   Constitutional:  Negative for fever.   Gastrointestinal:  Positive for abdominal pain. Negative for constipation, diarrhea, nausea and vomiting.   Genitourinary:  Positive for dysuria, flank pain, frequency and urgency.   All other systems reviewed and are negative.  Refer to HPI for additional details.    During this visit, appropriate PPE was worn, and hand hygiene was performed.    PMH:  has a past medical history of Hypertension and Tobacco abuse.    MEDS:   Current Outpatient Medications:     cefdinir (OMNICEF) 300 MG Cap, Take 1 Capsule by mouth 2 times a day for 7 days.,  "Disp: 14 Capsule, Rfl: 0    amLODIPine (NORVASC) 10 MG Tab, Take 1 Tablet by mouth every day., Disp: 30 Tablet, Rfl: 2    benzonatate (TESSALON) 100 MG Cap, Take 1 Capsule by mouth 3 times a day., Disp: 90 Capsule, Rfl: 2    gabapentin (NEURONTIN) 400 MG Cap, Take 2 Capsules by mouth 3 times a day., Disp: 180 Capsule, Rfl: 2    hydrALAZINE (APRESOLINE) 25 MG Tab, Take 3 Tablets by mouth 4 times a day., Disp: 360 Tablet, Rfl: 2    lisinopril (PRINIVIL) 40 MG tablet, Take 2 Tablets by mouth every day., Disp: 60 Tablet, Rfl: 2    ALLERGIES:   Allergies   Allergen Reactions    Pineapple Itching     SURGHX: History reviewed. No pertinent surgical history.  SOCHX:  reports that she has quit smoking. Her smoking use included cigarettes. She has never used smokeless tobacco. She reports that she does not currently use alcohol. She reports that she does not use drugs.    FH: Per HPI as applicable/pertinent.      Objective:     BP (!) 144/80 (BP Location: Left arm, Patient Position: Sitting, BP Cuff Size: Adult)   Pulse 72   Temp 36.9 °C (98.5 °F) (Temporal)   Resp 16   Ht 1.651 m (5' 5\")   Wt 83.4 kg (183 lb 12.8 oz)   SpO2 98%   BMI 30.59 kg/m²     Physical Exam  Nursing note reviewed.   Constitutional:       General: She is not in acute distress.     Appearance: She is well-developed. She is not ill-appearing or toxic-appearing.   Eyes:      General: Vision grossly intact.   Cardiovascular:      Rate and Rhythm: Normal rate.   Pulmonary:      Effort: Pulmonary effort is normal. No respiratory distress.   Abdominal:      General: Bowel sounds are normal. There is no distension.      Palpations: Abdomen is soft.      Tenderness: There is abdominal tenderness in the right lower quadrant. There is guarding. There is no right CVA tenderness, left CVA tenderness or rebound.   Musculoskeletal:         General: No deformity. Normal range of motion.   Skin:     General: Skin is warm and dry.      Coloration: Skin is not " pale.   Neurological:      Mental Status: She is alert and oriented to person, place, and time.      Motor: No weakness.   Psychiatric:         Behavior: Behavior normal. Behavior is cooperative.     UA: trace LE      Assessment/Plan:     1. Abdominal pain, unspecified abdominal location  - POCT Urinalysis  - URINE CULTURE(NEW); Future    2. Urinary tract infection without hematuria, site unspecified  - cefdinir (OMNICEF) 300 MG Cap; Take 1 Capsule by mouth 2 times a day for 7 days.  Dispense: 14 Capsule; Refill: 0     AI-assisted A&P:   Assessment & Plan  1. Abdominal pain  - Differential: appendicitis, bowel inflammation  - Exam: lower abdomen tenderness, pain score 9/10  - Recommend CT abdomen [but patient defers at this time]  - OTC analgesics (Tylenol, ibuprofen) for pain  - Advise rest and hydration  - If symptoms worsen, seek immediate medical attention [ER]    2. UTI  - Symptoms: dark urine, urinary urgency  - Exam and urinalysis: WBCs, no blood or nitrites  - Conduct urine culture  - Prescribe Cefdinir, send prescription electronically to pharmacy    Patient wishes start antibiotics and monitor symptoms at this time. She wishes to defer CT abdomen.    Monitor. Warning signs reviewed. Strict return/ER precautions advised.     Differential diagnosis, natural history, supportive care, over-the-counter symptom management per 's instructions, close monitoring, and indications for immediate follow-up discussed.     All questions answered. Patient agrees with the plan of care.

## 2025-05-15 LAB
BACTERIA UR CULT: NORMAL
SIGNIFICANT IND 70042: NORMAL
SITE SITE: NORMAL
SOURCE SOURCE: NORMAL